# Patient Record
Sex: MALE | Race: WHITE | Employment: OTHER | URBAN - METROPOLITAN AREA
[De-identification: names, ages, dates, MRNs, and addresses within clinical notes are randomized per-mention and may not be internally consistent; named-entity substitution may affect disease eponyms.]

---

## 2017-02-16 ENCOUNTER — OFFICE VISIT (OUTPATIENT)
Dept: FAMILY MEDICINE CLINIC | Age: 72
End: 2017-02-16

## 2017-02-16 ENCOUNTER — HOSPITAL ENCOUNTER (OUTPATIENT)
Dept: OTHER | Age: 72
Discharge: HOME | End: 2017-02-16
Attending: FAMILY MEDICINE | Admitting: FAMILY MEDICINE

## 2017-02-16 VITALS
SYSTOLIC BLOOD PRESSURE: 130 MMHG | WEIGHT: 214.2 LBS | BODY MASS INDEX: 31.73 KG/M2 | DIASTOLIC BLOOD PRESSURE: 70 MMHG | HEIGHT: 69 IN

## 2017-02-16 DIAGNOSIS — I27.20 PULMONARY HYPERTENSION (HCC): ICD-10-CM

## 2017-02-16 DIAGNOSIS — Z23 NEED FOR PNEUMOCOCCAL VACCINATION: ICD-10-CM

## 2017-02-16 DIAGNOSIS — I48.19 PERSISTENT ATRIAL FIBRILLATION (HCC): ICD-10-CM

## 2017-02-16 DIAGNOSIS — I10 ESSENTIAL HYPERTENSION: Primary | ICD-10-CM

## 2017-02-16 DIAGNOSIS — M17.10 KNEE ARTHROPATHY: ICD-10-CM

## 2017-02-16 DIAGNOSIS — E11.9 TYPE 2 DIABETES MELLITUS WITHOUT COMPLICATION, WITHOUT LONG-TERM CURRENT USE OF INSULIN (HCC): ICD-10-CM

## 2017-02-16 DIAGNOSIS — E78.49 OTHER HYPERLIPIDEMIA: ICD-10-CM

## 2017-02-16 LAB
ANION GAP SERPL CALCULATED.3IONS-SCNC: 18 MMOL/L (ref 3–16)
BILIRUBIN, POC: ABNORMAL
BLOOD URINE, POC: ABNORMAL
BUN BLDV-MCNC: 12 MG/DL (ref 7–20)
CALCIUM SERPL-MCNC: 9.5 MG/DL (ref 8.3–10.6)
CHLORIDE BLD-SCNC: 92 MMOL/L (ref 99–110)
CHOLESTEROL, TOTAL: 106 MG/DL (ref 0–199)
CLARITY, POC: ABNORMAL
CO2: 26 MMOL/L (ref 21–32)
COLOR, POC: YELLOW
CREAT SERPL-MCNC: 0.7 MG/DL (ref 0.8–1.3)
CREATININE URINE: 27.4 MG/DL (ref 39–259)
GFR AFRICAN AMERICAN: >60
GFR NON-AFRICAN AMERICAN: >60
GLUCOSE BLD-MCNC: 118 MG/DL (ref 70–99)
GLUCOSE URINE, POC: ABNORMAL
HCT VFR BLD CALC: 42.4 % (ref 40.5–52.5)
HDLC SERPL-MCNC: 49 MG/DL (ref 40–60)
HEMOGLOBIN: 13.9 G/DL (ref 13.5–17.5)
KETONES, POC: ABNORMAL
LDL CHOLESTEROL CALCULATED: 39 MG/DL
LEUKOCYTE EST, POC: ABNORMAL
MCH RBC QN AUTO: 31.6 PG (ref 26–34)
MCHC RBC AUTO-ENTMCNC: 32.8 G/DL (ref 31–36)
MCV RBC AUTO: 96.2 FL (ref 80–100)
MICROALBUMIN UR-MCNC: <1.2 MG/DL
MICROALBUMIN/CREAT UR-RTO: ABNORMAL MG/G (ref 0–30)
NITRITE, POC: ABNORMAL
PDW BLD-RTO: 13.2 % (ref 12.4–15.4)
PH, POC: 6
PLATELET # BLD: 293 K/UL (ref 135–450)
PMV BLD AUTO: 9.1 FL (ref 5–10.5)
POTASSIUM SERPL-SCNC: 4.9 MMOL/L (ref 3.5–5.1)
PRO-BNP: 1162 PG/ML (ref 0–124)
PROTEIN, POC: ABNORMAL
RBC # BLD: 4.41 M/UL (ref 4.2–5.9)
SEDIMENTATION RATE, ERYTHROCYTE: 35 MM/HR (ref 0–20)
SODIUM BLD-SCNC: 136 MMOL/L (ref 136–145)
SPECIFIC GRAVITY, POC: 1
TRIGL SERPL-MCNC: 92 MG/DL (ref 0–150)
URIC ACID, SERUM: 9.1 MG/DL (ref 3.5–7.2)
UROBILINOGEN, POC: 0.2
VLDLC SERPL CALC-MCNC: 18 MG/DL
WBC # BLD: 8.4 K/UL (ref 4–11)

## 2017-02-16 PROCEDURE — 3046F HEMOGLOBIN A1C LEVEL >9.0%: CPT | Performed by: FAMILY MEDICINE

## 2017-02-16 PROCEDURE — 36415 COLL VENOUS BLD VENIPUNCTURE: CPT | Performed by: FAMILY MEDICINE

## 2017-02-16 PROCEDURE — G8417 CALC BMI ABV UP PARAM F/U: HCPCS | Performed by: FAMILY MEDICINE

## 2017-02-16 PROCEDURE — 1123F ACP DISCUSS/DSCN MKR DOCD: CPT | Performed by: FAMILY MEDICINE

## 2017-02-16 PROCEDURE — 1036F TOBACCO NON-USER: CPT | Performed by: FAMILY MEDICINE

## 2017-02-16 PROCEDURE — 81002 URINALYSIS NONAUTO W/O SCOPE: CPT | Performed by: FAMILY MEDICINE

## 2017-02-16 PROCEDURE — 99214 OFFICE O/P EST MOD 30 MIN: CPT | Performed by: FAMILY MEDICINE

## 2017-02-16 PROCEDURE — G8484 FLU IMMUNIZE NO ADMIN: HCPCS | Performed by: FAMILY MEDICINE

## 2017-02-16 PROCEDURE — 3017F COLORECTAL CA SCREEN DOC REV: CPT | Performed by: FAMILY MEDICINE

## 2017-02-16 PROCEDURE — G8427 DOCREV CUR MEDS BY ELIG CLIN: HCPCS | Performed by: FAMILY MEDICINE

## 2017-02-16 PROCEDURE — 4040F PNEUMOC VAC/ADMIN/RCVD: CPT | Performed by: FAMILY MEDICINE

## 2017-02-16 PROCEDURE — 90670 PCV13 VACCINE IM: CPT | Performed by: FAMILY MEDICINE

## 2017-02-16 PROCEDURE — G0009 ADMIN PNEUMOCOCCAL VACCINE: HCPCS | Performed by: FAMILY MEDICINE

## 2017-02-16 RX ORDER — LISINOPRIL 40 MG/1
40 TABLET ORAL DAILY
COMMUNITY
End: 2017-08-14 | Stop reason: ALTCHOICE

## 2017-02-16 RX ORDER — SULFAMETHOXAZOLE AND TRIMETHOPRIM 800; 160 MG/1; MG/1
1 TABLET ORAL 2 TIMES DAILY
Qty: 20 TABLET | Refills: 0 | Status: SHIPPED | OUTPATIENT
Start: 2017-02-16 | End: 2017-02-16 | Stop reason: SDUPTHER

## 2017-02-16 RX ORDER — SULFAMETHOXAZOLE AND TRIMETHOPRIM 800; 160 MG/1; MG/1
1 TABLET ORAL 2 TIMES DAILY
Qty: 20 TABLET | Refills: 0 | Status: SHIPPED | OUTPATIENT
Start: 2017-02-16 | End: 2017-02-26

## 2017-02-16 RX ORDER — AMLODIPINE BESYLATE 10 MG/1
5 TABLET ORAL DAILY
Qty: 30 TABLET | Refills: 3
Start: 2017-02-16 | End: 2017-05-24 | Stop reason: SINTOL

## 2017-02-16 ASSESSMENT — PATIENT HEALTH QUESTIONNAIRE - PHQ9
SUM OF ALL RESPONSES TO PHQ QUESTIONS 1-9: 1
1. LITTLE INTEREST OR PLEASURE IN DOING THINGS: 1
SUM OF ALL RESPONSES TO PHQ9 QUESTIONS 1 & 2: 1
2. FEELING DOWN, DEPRESSED OR HOPELESS: 0

## 2017-02-16 ASSESSMENT — ENCOUNTER SYMPTOMS
TROUBLE SWALLOWING: 0
CONSTIPATION: 0
CHEST TIGHTNESS: 0
SHORTNESS OF BREATH: 0
SORE THROAT: 0
ABDOMINAL PAIN: 0
DIARRHEA: 0
WHEEZING: 0
BLOOD IN STOOL: 0

## 2017-02-17 DIAGNOSIS — M10.9 GOUTY ARTHROPATHY: Primary | ICD-10-CM

## 2017-02-17 LAB
ESTIMATED AVERAGE GLUCOSE: 134.1 MG/DL
HBA1C MFR BLD: 6.3 %

## 2017-02-17 RX ORDER — METHYLPREDNISOLONE 4 MG/1
TABLET ORAL
Qty: 1 KIT | Refills: 0 | Status: SHIPPED | OUTPATIENT
Start: 2017-02-17 | End: 2017-02-23

## 2017-03-10 ENCOUNTER — TELEPHONE (OUTPATIENT)
Dept: FAMILY MEDICINE CLINIC | Age: 72
End: 2017-03-10

## 2017-05-24 ENCOUNTER — OFFICE VISIT (OUTPATIENT)
Dept: FAMILY MEDICINE CLINIC | Age: 72
End: 2017-05-24

## 2017-05-24 VITALS
DIASTOLIC BLOOD PRESSURE: 70 MMHG | SYSTOLIC BLOOD PRESSURE: 134 MMHG | BODY MASS INDEX: 31.76 KG/M2 | HEIGHT: 69 IN | WEIGHT: 214.4 LBS

## 2017-05-24 DIAGNOSIS — M25.473 ANKLE EDEMA: Primary | ICD-10-CM

## 2017-05-24 DIAGNOSIS — I27.20 PULMONARY HYPERTENSION (HCC): ICD-10-CM

## 2017-05-24 DIAGNOSIS — E11.9 TYPE 2 DIABETES MELLITUS WITHOUT COMPLICATION, WITHOUT LONG-TERM CURRENT USE OF INSULIN (HCC): ICD-10-CM

## 2017-05-24 DIAGNOSIS — I10 ESSENTIAL HYPERTENSION: ICD-10-CM

## 2017-05-24 PROCEDURE — 1123F ACP DISCUSS/DSCN MKR DOCD: CPT | Performed by: FAMILY MEDICINE

## 2017-05-24 PROCEDURE — G8417 CALC BMI ABV UP PARAM F/U: HCPCS | Performed by: FAMILY MEDICINE

## 2017-05-24 PROCEDURE — 4040F PNEUMOC VAC/ADMIN/RCVD: CPT | Performed by: FAMILY MEDICINE

## 2017-05-24 PROCEDURE — G8427 DOCREV CUR MEDS BY ELIG CLIN: HCPCS | Performed by: FAMILY MEDICINE

## 2017-05-24 PROCEDURE — 1036F TOBACCO NON-USER: CPT | Performed by: FAMILY MEDICINE

## 2017-05-24 PROCEDURE — 99213 OFFICE O/P EST LOW 20 MIN: CPT | Performed by: FAMILY MEDICINE

## 2017-05-24 PROCEDURE — 3044F HG A1C LEVEL LT 7.0%: CPT | Performed by: FAMILY MEDICINE

## 2017-05-24 PROCEDURE — 3017F COLORECTAL CA SCREEN DOC REV: CPT | Performed by: FAMILY MEDICINE

## 2017-05-24 RX ORDER — FUROSEMIDE 20 MG/1
40 TABLET ORAL 2 TIMES DAILY
Qty: 120 TABLET | Refills: 3 | Status: SHIPPED | OUTPATIENT
Start: 2017-05-24 | End: 2018-07-30 | Stop reason: SDUPTHER

## 2017-05-24 ASSESSMENT — ENCOUNTER SYMPTOMS
CHEST TIGHTNESS: 0
ABDOMINAL PAIN: 0
SHORTNESS OF BREATH: 0

## 2017-06-13 ENCOUNTER — OFFICE VISIT (OUTPATIENT)
Dept: FAMILY MEDICINE CLINIC | Age: 72
End: 2017-06-13

## 2017-06-13 VITALS
HEIGHT: 70 IN | HEART RATE: 101 BPM | DIASTOLIC BLOOD PRESSURE: 80 MMHG | WEIGHT: 210 LBS | BODY MASS INDEX: 30.06 KG/M2 | SYSTOLIC BLOOD PRESSURE: 130 MMHG

## 2017-06-13 DIAGNOSIS — E11.9 TYPE 2 DIABETES MELLITUS WITHOUT COMPLICATION, WITHOUT LONG-TERM CURRENT USE OF INSULIN (HCC): ICD-10-CM

## 2017-06-13 DIAGNOSIS — I48.19 PERSISTENT ATRIAL FIBRILLATION (HCC): ICD-10-CM

## 2017-06-13 DIAGNOSIS — I27.20 PULMONARY HYPERTENSION (HCC): Primary | ICD-10-CM

## 2017-06-13 DIAGNOSIS — I10 ESSENTIAL HYPERTENSION: ICD-10-CM

## 2017-06-13 PROCEDURE — G8417 CALC BMI ABV UP PARAM F/U: HCPCS | Performed by: FAMILY MEDICINE

## 2017-06-13 PROCEDURE — 4040F PNEUMOC VAC/ADMIN/RCVD: CPT | Performed by: FAMILY MEDICINE

## 2017-06-13 PROCEDURE — 99213 OFFICE O/P EST LOW 20 MIN: CPT | Performed by: FAMILY MEDICINE

## 2017-06-13 PROCEDURE — 1123F ACP DISCUSS/DSCN MKR DOCD: CPT | Performed by: FAMILY MEDICINE

## 2017-06-13 PROCEDURE — G8427 DOCREV CUR MEDS BY ELIG CLIN: HCPCS | Performed by: FAMILY MEDICINE

## 2017-06-13 PROCEDURE — 3017F COLORECTAL CA SCREEN DOC REV: CPT | Performed by: FAMILY MEDICINE

## 2017-06-13 PROCEDURE — 1036F TOBACCO NON-USER: CPT | Performed by: FAMILY MEDICINE

## 2017-06-13 PROCEDURE — 3046F HEMOGLOBIN A1C LEVEL >9.0%: CPT | Performed by: FAMILY MEDICINE

## 2017-06-14 ASSESSMENT — ENCOUNTER SYMPTOMS
CHEST TIGHTNESS: 0
ABDOMINAL PAIN: 0
SHORTNESS OF BREATH: 0

## 2017-08-22 ENCOUNTER — TELEPHONE (OUTPATIENT)
Dept: CARDIOLOGY CLINIC | Age: 72
End: 2017-08-22

## 2017-08-30 ENCOUNTER — OFFICE VISIT (OUTPATIENT)
Dept: CARDIOLOGY CLINIC | Age: 72
End: 2017-08-30

## 2017-08-30 VITALS
HEART RATE: 96 BPM | SYSTOLIC BLOOD PRESSURE: 148 MMHG | HEIGHT: 70 IN | BODY MASS INDEX: 28.35 KG/M2 | WEIGHT: 198 LBS | DIASTOLIC BLOOD PRESSURE: 90 MMHG | OXYGEN SATURATION: 96 %

## 2017-08-30 DIAGNOSIS — M25.512 ACUTE PAIN OF LEFT SHOULDER: ICD-10-CM

## 2017-08-30 DIAGNOSIS — I10 ESSENTIAL HYPERTENSION: ICD-10-CM

## 2017-08-30 DIAGNOSIS — I48.19 PERSISTENT ATRIAL FIBRILLATION (HCC): Primary | ICD-10-CM

## 2017-08-30 DIAGNOSIS — J90 PLEURAL EFFUSION: ICD-10-CM

## 2017-08-30 DIAGNOSIS — I20.8 ANGINA AT REST (HCC): ICD-10-CM

## 2017-08-30 PROCEDURE — 4040F PNEUMOC VAC/ADMIN/RCVD: CPT | Performed by: INTERNAL MEDICINE

## 2017-08-30 PROCEDURE — G8598 ASA/ANTIPLAT THER USED: HCPCS | Performed by: INTERNAL MEDICINE

## 2017-08-30 PROCEDURE — 99215 OFFICE O/P EST HI 40 MIN: CPT | Performed by: INTERNAL MEDICINE

## 2017-08-30 PROCEDURE — G8417 CALC BMI ABV UP PARAM F/U: HCPCS | Performed by: INTERNAL MEDICINE

## 2017-08-30 PROCEDURE — 93000 ELECTROCARDIOGRAM COMPLETE: CPT | Performed by: INTERNAL MEDICINE

## 2017-08-30 PROCEDURE — G8427 DOCREV CUR MEDS BY ELIG CLIN: HCPCS | Performed by: INTERNAL MEDICINE

## 2017-08-30 PROCEDURE — 1123F ACP DISCUSS/DSCN MKR DOCD: CPT | Performed by: INTERNAL MEDICINE

## 2017-08-30 PROCEDURE — 1036F TOBACCO NON-USER: CPT | Performed by: INTERNAL MEDICINE

## 2017-08-30 PROCEDURE — 3017F COLORECTAL CA SCREEN DOC REV: CPT | Performed by: INTERNAL MEDICINE

## 2017-08-30 RX ORDER — LISINOPRIL 5 MG/1
5 TABLET ORAL DAILY
Qty: 30 TABLET | Refills: 6 | Status: ON HOLD | OUTPATIENT
Start: 2017-08-30 | End: 2019-01-01 | Stop reason: HOSPADM

## 2017-08-30 RX ORDER — LISINOPRIL 5 MG/1
5 TABLET ORAL DAILY
Qty: 30 TABLET | Refills: 6 | Status: SHIPPED | OUTPATIENT
Start: 2017-08-30 | End: 2017-08-30 | Stop reason: SDUPTHER

## 2017-09-01 ENCOUNTER — TELEPHONE (OUTPATIENT)
Dept: CARDIOLOGY CLINIC | Age: 72
End: 2017-09-01

## 2017-09-01 RX ORDER — CLONIDINE HYDROCHLORIDE 0.1 MG/1
0.1 TABLET ORAL 2 TIMES DAILY
Qty: 60 TABLET | Refills: 3 | Status: SHIPPED | OUTPATIENT
Start: 2017-09-01 | End: 2019-01-01 | Stop reason: DRUGHIGH

## 2017-09-18 ENCOUNTER — HOSPITAL ENCOUNTER (OUTPATIENT)
Dept: NON INVASIVE DIAGNOSTICS | Age: 72
Discharge: HOME | End: 2017-09-18
Admitting: INTERNAL MEDICINE

## 2017-09-18 DIAGNOSIS — I20.8 OTHER FORMS OF ANGINA PECTORIS (HCC): ICD-10-CM

## 2017-09-18 DIAGNOSIS — J90 PLEURAL EFFUSION: ICD-10-CM

## 2017-09-21 ENCOUNTER — TELEPHONE (OUTPATIENT)
Dept: CARDIOLOGY CLINIC | Age: 72
End: 2017-09-21

## 2018-01-01 ENCOUNTER — HOSPITAL ENCOUNTER (OUTPATIENT)
Dept: GENERAL RADIOLOGY | Age: 73
Discharge: HOME | End: 2018-12-10
Payer: MEDICARE

## 2018-01-01 ENCOUNTER — TELEPHONE (OUTPATIENT)
Dept: FAMILY MEDICINE CLINIC | Age: 73
End: 2018-01-01

## 2018-01-01 ENCOUNTER — HOSPITAL ENCOUNTER (OUTPATIENT)
Age: 73
Discharge: HOME | End: 2018-12-10
Payer: MEDICARE

## 2018-01-01 ENCOUNTER — OFFICE VISIT (OUTPATIENT)
Dept: CARDIOLOGY CLINIC | Age: 73
End: 2018-01-01
Payer: MEDICARE

## 2018-01-01 VITALS
HEIGHT: 70 IN | DIASTOLIC BLOOD PRESSURE: 74 MMHG | WEIGHT: 194 LBS | HEART RATE: 62 BPM | OXYGEN SATURATION: 95 % | SYSTOLIC BLOOD PRESSURE: 116 MMHG | BODY MASS INDEX: 27.77 KG/M2

## 2018-01-01 DIAGNOSIS — I73.9 INTERMITTENT CLAUDICATION (HCC): ICD-10-CM

## 2018-01-01 DIAGNOSIS — I10 ESSENTIAL HYPERTENSION: ICD-10-CM

## 2018-01-01 DIAGNOSIS — R52 PAIN: ICD-10-CM

## 2018-01-01 DIAGNOSIS — I25.10 CORONARY ARTERY CALCIFICATION SEEN ON CT SCAN: ICD-10-CM

## 2018-01-01 DIAGNOSIS — I48.20 CHRONIC ATRIAL FIBRILLATION (HCC): Primary | ICD-10-CM

## 2018-01-01 DIAGNOSIS — E78.5 HYPERLIPIDEMIA LDL GOAL <70: ICD-10-CM

## 2018-01-01 DIAGNOSIS — I73.9 PAD (PERIPHERAL ARTERY DISEASE) (HCC): ICD-10-CM

## 2018-01-01 DIAGNOSIS — R13.10 DYSPHAGIA, UNSPECIFIED TYPE: ICD-10-CM

## 2018-01-01 PROCEDURE — 3017F COLORECTAL CA SCREEN DOC REV: CPT | Performed by: INTERNAL MEDICINE

## 2018-01-01 PROCEDURE — 93000 ELECTROCARDIOGRAM COMPLETE: CPT | Performed by: INTERNAL MEDICINE

## 2018-01-01 PROCEDURE — 99214 OFFICE O/P EST MOD 30 MIN: CPT | Performed by: INTERNAL MEDICINE

## 2018-01-01 PROCEDURE — 1101F PT FALLS ASSESS-DOCD LE1/YR: CPT | Performed by: INTERNAL MEDICINE

## 2018-01-01 PROCEDURE — 1123F ACP DISCUSS/DSCN MKR DOCD: CPT | Performed by: INTERNAL MEDICINE

## 2018-01-01 PROCEDURE — G8417 CALC BMI ABV UP PARAM F/U: HCPCS | Performed by: INTERNAL MEDICINE

## 2018-01-01 PROCEDURE — G8482 FLU IMMUNIZE ORDER/ADMIN: HCPCS | Performed by: INTERNAL MEDICINE

## 2018-01-01 PROCEDURE — 1036F TOBACCO NON-USER: CPT | Performed by: INTERNAL MEDICINE

## 2018-01-01 PROCEDURE — 71046 X-RAY EXAM CHEST 2 VIEWS: CPT

## 2018-01-01 PROCEDURE — G8599 NO ASA/ANTIPLAT THER USE RNG: HCPCS | Performed by: INTERNAL MEDICINE

## 2018-01-01 PROCEDURE — G8427 DOCREV CUR MEDS BY ELIG CLIN: HCPCS | Performed by: INTERNAL MEDICINE

## 2018-01-01 PROCEDURE — 4040F PNEUMOC VAC/ADMIN/RCVD: CPT | Performed by: INTERNAL MEDICINE

## 2018-02-15 ENCOUNTER — OFFICE VISIT (OUTPATIENT)
Dept: FAMILY MEDICINE CLINIC | Age: 73
End: 2018-02-15

## 2018-02-15 ENCOUNTER — TELEPHONE (OUTPATIENT)
Dept: CARDIOLOGY CLINIC | Age: 73
End: 2018-02-15

## 2018-02-15 VITALS
HEIGHT: 70 IN | WEIGHT: 205 LBS | BODY MASS INDEX: 29.35 KG/M2 | DIASTOLIC BLOOD PRESSURE: 80 MMHG | SYSTOLIC BLOOD PRESSURE: 140 MMHG

## 2018-02-15 DIAGNOSIS — I10 ESSENTIAL HYPERTENSION: Primary | ICD-10-CM

## 2018-02-15 DIAGNOSIS — E11.42 TYPE 2 DIABETES MELLITUS WITH DIABETIC POLYNEUROPATHY, WITHOUT LONG-TERM CURRENT USE OF INSULIN (HCC): ICD-10-CM

## 2018-02-15 DIAGNOSIS — G25.2 RESTING TREMOR: ICD-10-CM

## 2018-02-15 DIAGNOSIS — I48.20 CHRONIC ATRIAL FIBRILLATION (HCC): ICD-10-CM

## 2018-02-15 DIAGNOSIS — E78.2 MIXED HYPERLIPIDEMIA: ICD-10-CM

## 2018-02-15 DIAGNOSIS — I27.20 PULMONARY HYPERTENSION (HCC): ICD-10-CM

## 2018-02-15 PROCEDURE — G8417 CALC BMI ABV UP PARAM F/U: HCPCS | Performed by: FAMILY MEDICINE

## 2018-02-15 PROCEDURE — 99214 OFFICE O/P EST MOD 30 MIN: CPT | Performed by: FAMILY MEDICINE

## 2018-02-15 PROCEDURE — 3046F HEMOGLOBIN A1C LEVEL >9.0%: CPT | Performed by: FAMILY MEDICINE

## 2018-02-15 PROCEDURE — 1123F ACP DISCUSS/DSCN MKR DOCD: CPT | Performed by: FAMILY MEDICINE

## 2018-02-15 PROCEDURE — G8484 FLU IMMUNIZE NO ADMIN: HCPCS | Performed by: FAMILY MEDICINE

## 2018-02-15 PROCEDURE — G8427 DOCREV CUR MEDS BY ELIG CLIN: HCPCS | Performed by: FAMILY MEDICINE

## 2018-02-15 PROCEDURE — 3017F COLORECTAL CA SCREEN DOC REV: CPT | Performed by: FAMILY MEDICINE

## 2018-02-15 PROCEDURE — 4040F PNEUMOC VAC/ADMIN/RCVD: CPT | Performed by: FAMILY MEDICINE

## 2018-02-15 PROCEDURE — 1036F TOBACCO NON-USER: CPT | Performed by: FAMILY MEDICINE

## 2018-02-15 ASSESSMENT — ENCOUNTER SYMPTOMS
TROUBLE SWALLOWING: 0
ABDOMINAL PAIN: 0
SHORTNESS OF BREATH: 0
DIARRHEA: 0
SORE THROAT: 0
BLOOD IN STOOL: 0
CHEST TIGHTNESS: 0
CONSTIPATION: 0
WHEEZING: 0

## 2018-02-16 NOTE — TELEPHONE ENCOUNTER
Spoke with Traci LUNA and paperwork will be completed on Monday AM.  I called pt made him aware he can  Monday. Pt voiced understanding.

## 2018-02-19 ENCOUNTER — TELEPHONE (OUTPATIENT)
Dept: FAMILY MEDICINE CLINIC | Age: 73
End: 2018-02-19

## 2018-03-20 ENCOUNTER — OFFICE VISIT (OUTPATIENT)
Dept: CARDIOLOGY CLINIC | Age: 73
End: 2018-03-20

## 2018-03-20 VITALS
BODY MASS INDEX: 28.63 KG/M2 | HEART RATE: 58 BPM | DIASTOLIC BLOOD PRESSURE: 72 MMHG | HEIGHT: 70 IN | OXYGEN SATURATION: 96 % | SYSTOLIC BLOOD PRESSURE: 130 MMHG | WEIGHT: 200 LBS

## 2018-03-20 DIAGNOSIS — I25.10 CORONARY ARTERY CALCIFICATION SEEN ON CT SCAN: ICD-10-CM

## 2018-03-20 DIAGNOSIS — I10 ESSENTIAL HYPERTENSION: ICD-10-CM

## 2018-03-20 DIAGNOSIS — I48.20 CHRONIC ATRIAL FIBRILLATION (HCC): Primary | ICD-10-CM

## 2018-03-20 DIAGNOSIS — E78.5 HYPERLIPIDEMIA LDL GOAL <70: ICD-10-CM

## 2018-03-20 PROCEDURE — 1123F ACP DISCUSS/DSCN MKR DOCD: CPT | Performed by: INTERNAL MEDICINE

## 2018-03-20 PROCEDURE — 93000 ELECTROCARDIOGRAM COMPLETE: CPT | Performed by: INTERNAL MEDICINE

## 2018-03-20 PROCEDURE — G8427 DOCREV CUR MEDS BY ELIG CLIN: HCPCS | Performed by: INTERNAL MEDICINE

## 2018-03-20 PROCEDURE — 99214 OFFICE O/P EST MOD 30 MIN: CPT | Performed by: INTERNAL MEDICINE

## 2018-03-20 PROCEDURE — 1036F TOBACCO NON-USER: CPT | Performed by: INTERNAL MEDICINE

## 2018-03-20 PROCEDURE — G8484 FLU IMMUNIZE NO ADMIN: HCPCS | Performed by: INTERNAL MEDICINE

## 2018-03-20 PROCEDURE — G8599 NO ASA/ANTIPLAT THER USE RNG: HCPCS | Performed by: INTERNAL MEDICINE

## 2018-03-20 PROCEDURE — 4040F PNEUMOC VAC/ADMIN/RCVD: CPT | Performed by: INTERNAL MEDICINE

## 2018-03-20 PROCEDURE — 3017F COLORECTAL CA SCREEN DOC REV: CPT | Performed by: INTERNAL MEDICINE

## 2018-03-20 PROCEDURE — G8417 CALC BMI ABV UP PARAM F/U: HCPCS | Performed by: INTERNAL MEDICINE

## 2018-05-21 ENCOUNTER — TELEPHONE (OUTPATIENT)
Dept: FAMILY MEDICINE CLINIC | Age: 73
End: 2018-05-21

## 2018-05-21 RX ORDER — METHYLPREDNISOLONE 4 MG/1
TABLET ORAL
Qty: 1 KIT | Refills: 0 | Status: SHIPPED | OUTPATIENT
Start: 2018-05-21 | End: 2018-05-27

## 2018-05-21 RX ORDER — METHYLPREDNISOLONE 4 MG/1
TABLET ORAL
Qty: 1 KIT | Refills: 0 | Status: SHIPPED | OUTPATIENT
Start: 2018-05-21 | End: 2018-05-21 | Stop reason: SDUPTHER

## 2018-06-14 ENCOUNTER — OFFICE VISIT (OUTPATIENT)
Dept: FAMILY MEDICINE CLINIC | Age: 73
End: 2018-06-14

## 2018-06-14 ENCOUNTER — HOSPITAL ENCOUNTER (OUTPATIENT)
Dept: OTHER | Age: 73
Discharge: HOME | End: 2018-06-14
Attending: FAMILY MEDICINE | Admitting: FAMILY MEDICINE

## 2018-06-14 VITALS
DIASTOLIC BLOOD PRESSURE: 70 MMHG | HEIGHT: 70 IN | BODY MASS INDEX: 28.83 KG/M2 | WEIGHT: 201.4 LBS | SYSTOLIC BLOOD PRESSURE: 120 MMHG

## 2018-06-14 DIAGNOSIS — R60.9 DEPENDENT EDEMA: Primary | ICD-10-CM

## 2018-06-14 DIAGNOSIS — I27.20 PULMONARY HYPERTENSION (HCC): ICD-10-CM

## 2018-06-14 DIAGNOSIS — E11.42 TYPE 2 DIABETES MELLITUS WITH DIABETIC POLYNEUROPATHY, WITHOUT LONG-TERM CURRENT USE OF INSULIN (HCC): ICD-10-CM

## 2018-06-14 DIAGNOSIS — I10 ESSENTIAL HYPERTENSION: ICD-10-CM

## 2018-06-14 DIAGNOSIS — R60.9 DEPENDENT EDEMA: ICD-10-CM

## 2018-06-14 LAB
A/G RATIO: 1.9 (ref 1.1–2.2)
ALBUMIN SERPL-MCNC: 4.1 G/DL (ref 3.4–5)
ALP BLD-CCNC: 239 U/L (ref 40–129)
ALT SERPL-CCNC: 8 U/L (ref 10–40)
ANION GAP SERPL CALCULATED.3IONS-SCNC: 18 MMOL/L (ref 3–16)
AST SERPL-CCNC: 11 U/L (ref 15–37)
BILIRUB SERPL-MCNC: 0.9 MG/DL (ref 0–1)
BUN BLDV-MCNC: 14 MG/DL (ref 7–20)
CALCIUM SERPL-MCNC: 9 MG/DL (ref 8.3–10.6)
CHLORIDE BLD-SCNC: 97 MMOL/L (ref 99–110)
CO2: 26 MMOL/L (ref 21–32)
CREAT SERPL-MCNC: 1.4 MG/DL (ref 0.8–1.3)
GFR AFRICAN AMERICAN: >60
GFR NON-AFRICAN AMERICAN: 50
GLOBULIN: 2.2 G/DL
GLUCOSE BLD-MCNC: 233 MG/DL (ref 70–99)
HCT VFR BLD CALC: 36.1 % (ref 40.5–52.5)
HEMOGLOBIN: 12.5 G/DL (ref 13.5–17.5)
MCH RBC QN AUTO: 32.1 PG (ref 26–34)
MCHC RBC AUTO-ENTMCNC: 34.5 G/DL (ref 31–36)
MCV RBC AUTO: 92.9 FL (ref 80–100)
PDW BLD-RTO: 14 % (ref 12.4–15.4)
PLATELET # BLD: 193 K/UL (ref 135–450)
PMV BLD AUTO: 8.9 FL (ref 5–10.5)
POTASSIUM SERPL-SCNC: 4.6 MMOL/L (ref 3.5–5.1)
PRO-BNP: 2812 PG/ML (ref 0–124)
RBC # BLD: 3.89 M/UL (ref 4.2–5.9)
SODIUM BLD-SCNC: 141 MMOL/L (ref 136–145)
TOTAL PROTEIN: 6.3 G/DL (ref 6.4–8.2)
TSH REFLEX: 2.29 UIU/ML (ref 0.27–4.2)
WBC # BLD: 7.2 K/UL (ref 4–11)

## 2018-06-14 PROCEDURE — 2022F DILAT RTA XM EVC RTNOPTHY: CPT | Performed by: FAMILY MEDICINE

## 2018-06-14 PROCEDURE — 4040F PNEUMOC VAC/ADMIN/RCVD: CPT | Performed by: FAMILY MEDICINE

## 2018-06-14 PROCEDURE — 1036F TOBACCO NON-USER: CPT | Performed by: FAMILY MEDICINE

## 2018-06-14 PROCEDURE — 3046F HEMOGLOBIN A1C LEVEL >9.0%: CPT | Performed by: FAMILY MEDICINE

## 2018-06-14 PROCEDURE — 36415 COLL VENOUS BLD VENIPUNCTURE: CPT | Performed by: FAMILY MEDICINE

## 2018-06-14 PROCEDURE — G8427 DOCREV CUR MEDS BY ELIG CLIN: HCPCS | Performed by: FAMILY MEDICINE

## 2018-06-14 PROCEDURE — 1123F ACP DISCUSS/DSCN MKR DOCD: CPT | Performed by: FAMILY MEDICINE

## 2018-06-14 PROCEDURE — G8599 NO ASA/ANTIPLAT THER USE RNG: HCPCS | Performed by: FAMILY MEDICINE

## 2018-06-14 PROCEDURE — 3017F COLORECTAL CA SCREEN DOC REV: CPT | Performed by: FAMILY MEDICINE

## 2018-06-14 PROCEDURE — G8417 CALC BMI ABV UP PARAM F/U: HCPCS | Performed by: FAMILY MEDICINE

## 2018-06-14 PROCEDURE — 99214 OFFICE O/P EST MOD 30 MIN: CPT | Performed by: FAMILY MEDICINE

## 2018-06-14 ASSESSMENT — ENCOUNTER SYMPTOMS
COUGH: 0
EYE PAIN: 0
SHORTNESS OF BREATH: 0
WHEEZING: 0
ABDOMINAL PAIN: 0

## 2018-06-14 ASSESSMENT — PATIENT HEALTH QUESTIONNAIRE - PHQ9
SUM OF ALL RESPONSES TO PHQ9 QUESTIONS 1 & 2: 0
1. LITTLE INTEREST OR PLEASURE IN DOING THINGS: 0
SUM OF ALL RESPONSES TO PHQ QUESTIONS 1-9: 0
2. FEELING DOWN, DEPRESSED OR HOPELESS: 0

## 2018-06-15 DIAGNOSIS — R74.8 ALKALINE PHOSPHATASE ELEVATION: ICD-10-CM

## 2018-06-15 DIAGNOSIS — N18.2 CRF (CHRONIC RENAL FAILURE), STAGE 2 (MILD): Primary | ICD-10-CM

## 2018-06-15 LAB
ESTIMATED AVERAGE GLUCOSE: 168.6 MG/DL
HBA1C MFR BLD: 7.5 %

## 2018-06-16 ENCOUNTER — TELEPHONE (OUTPATIENT)
Dept: FAMILY MEDICINE CLINIC | Age: 73
End: 2018-06-16

## 2018-06-16 NOTE — TELEPHONE ENCOUNTER
Patient got a call from [facility] to schedule for an ultra sound for his liver and kidney. They schedule it for Monday. He is anxious about this and would like Dr Mario Hyatt or Amarilys Blackmon to call back. Please call back.

## 2018-06-18 ENCOUNTER — HOSPITAL ENCOUNTER (OUTPATIENT)
Dept: ULTRASOUND IMAGING | Age: 73
Discharge: HOME | End: 2018-06-18
Admitting: FAMILY MEDICINE

## 2018-06-18 DIAGNOSIS — N18.2 CRF (CHRONIC RENAL FAILURE), STAGE 2 (MILD): ICD-10-CM

## 2018-06-18 DIAGNOSIS — R74.8 ALKALINE PHOSPHATASE ELEVATION: ICD-10-CM

## 2018-06-18 DIAGNOSIS — N18.2 CHRONIC KIDNEY DISEASE, STAGE II (MILD): ICD-10-CM

## 2018-06-21 ENCOUNTER — OFFICE VISIT (OUTPATIENT)
Dept: FAMILY MEDICINE CLINIC | Age: 73
End: 2018-06-21

## 2018-06-21 VITALS
HEIGHT: 70 IN | BODY MASS INDEX: 28.77 KG/M2 | WEIGHT: 201 LBS | DIASTOLIC BLOOD PRESSURE: 80 MMHG | SYSTOLIC BLOOD PRESSURE: 130 MMHG

## 2018-06-21 DIAGNOSIS — I50.22 CHRONIC SYSTOLIC CONGESTIVE HEART FAILURE (HCC): ICD-10-CM

## 2018-06-21 DIAGNOSIS — E11.42 TYPE 2 DIABETES MELLITUS WITH DIABETIC POLYNEUROPATHY, WITHOUT LONG-TERM CURRENT USE OF INSULIN (HCC): Primary | ICD-10-CM

## 2018-06-21 DIAGNOSIS — I10 ESSENTIAL HYPERTENSION: ICD-10-CM

## 2018-06-21 DIAGNOSIS — I51.7 LVH (LEFT VENTRICULAR HYPERTROPHY): ICD-10-CM

## 2018-06-21 DIAGNOSIS — I48.20 CHRONIC ATRIAL FIBRILLATION (HCC): ICD-10-CM

## 2018-06-21 LAB
A/G RATIO: 1.6 (ref 1.1–2.2)
ALBUMIN SERPL-MCNC: 4.2 G/DL (ref 3.4–5)
ALP BLD-CCNC: 236 U/L (ref 40–129)
ALT SERPL-CCNC: 9 U/L (ref 10–40)
ANION GAP SERPL CALCULATED.3IONS-SCNC: 16 MMOL/L (ref 3–16)
AST SERPL-CCNC: 15 U/L (ref 15–37)
BILIRUB SERPL-MCNC: 0.8 MG/DL (ref 0–1)
BUN BLDV-MCNC: 13 MG/DL (ref 7–20)
CALCIUM SERPL-MCNC: 9.3 MG/DL (ref 8.3–10.6)
CHLORIDE BLD-SCNC: 97 MMOL/L (ref 99–110)
CO2: 28 MMOL/L (ref 21–32)
CREAT SERPL-MCNC: 1.1 MG/DL (ref 0.8–1.3)
GFR AFRICAN AMERICAN: >60
GFR NON-AFRICAN AMERICAN: >60
GLOBULIN: 2.6 G/DL
GLUCOSE BLD-MCNC: 135 MG/DL (ref 70–99)
POTASSIUM SERPL-SCNC: 5.2 MMOL/L (ref 3.5–5.1)
PRO-BNP: 3863 PG/ML (ref 0–124)
SODIUM BLD-SCNC: 141 MMOL/L (ref 136–145)
TOTAL PROTEIN: 6.8 G/DL (ref 6.4–8.2)

## 2018-06-21 PROCEDURE — 36415 COLL VENOUS BLD VENIPUNCTURE: CPT | Performed by: FAMILY MEDICINE

## 2018-06-21 PROCEDURE — G8427 DOCREV CUR MEDS BY ELIG CLIN: HCPCS | Performed by: FAMILY MEDICINE

## 2018-06-21 PROCEDURE — 2022F DILAT RTA XM EVC RTNOPTHY: CPT | Performed by: FAMILY MEDICINE

## 2018-06-21 PROCEDURE — G8417 CALC BMI ABV UP PARAM F/U: HCPCS | Performed by: FAMILY MEDICINE

## 2018-06-21 PROCEDURE — G8599 NO ASA/ANTIPLAT THER USE RNG: HCPCS | Performed by: FAMILY MEDICINE

## 2018-06-21 PROCEDURE — 3017F COLORECTAL CA SCREEN DOC REV: CPT | Performed by: FAMILY MEDICINE

## 2018-06-21 PROCEDURE — 1123F ACP DISCUSS/DSCN MKR DOCD: CPT | Performed by: FAMILY MEDICINE

## 2018-06-21 PROCEDURE — 1036F TOBACCO NON-USER: CPT | Performed by: FAMILY MEDICINE

## 2018-06-21 PROCEDURE — 3045F PR MOST RECENT HEMOGLOBIN A1C LEVEL 7.0-9.0%: CPT | Performed by: FAMILY MEDICINE

## 2018-06-21 PROCEDURE — 4040F PNEUMOC VAC/ADMIN/RCVD: CPT | Performed by: FAMILY MEDICINE

## 2018-06-21 PROCEDURE — 99213 OFFICE O/P EST LOW 20 MIN: CPT | Performed by: FAMILY MEDICINE

## 2018-06-22 ENCOUNTER — TELEPHONE (OUTPATIENT)
Dept: CARDIOLOGY CLINIC | Age: 73
End: 2018-06-22

## 2018-06-22 DIAGNOSIS — I27.20 PULMONARY HYPERTENSION (HCC): Primary | ICD-10-CM

## 2018-06-25 NOTE — TELEPHONE ENCOUNTER
We would need to know a lot more. Weight? Decrease? Increase? Shortness of breath, etc? Also needs a repeat BMP at least for the elevated potassium, moy in light of lisinopril increase.

## 2018-06-25 NOTE — TELEPHONE ENCOUNTER
Dr. Dariel Long     Patient does not complete daily weights but states his weight remains stable and fluctuates between 200-201. He denies any shortness of breath. He has been taking a potassium supplement that he was told he could take by Dr. Halie Dominguez. Instructed him to discontinue taking this given the increase in lisinopril and elevated potassium reading. He will complete repeat labs on Thursday or Friday once stopping the potassium supplement. Encouraged to complete echo as ordered by Dr. Halie Dominguez     Will follow up on patient's status toward the end of the week.

## 2018-06-26 DIAGNOSIS — I51.7 LVH (LEFT VENTRICULAR HYPERTROPHY): Primary | ICD-10-CM

## 2018-06-28 ENCOUNTER — TELEPHONE (OUTPATIENT)
Dept: FAMILY MEDICINE CLINIC | Age: 73
End: 2018-06-28

## 2018-06-29 ENCOUNTER — NURSE ONLY (OUTPATIENT)
Dept: FAMILY MEDICINE CLINIC | Age: 73
End: 2018-06-29

## 2018-06-29 DIAGNOSIS — I50.22 CHRONIC SYSTOLIC HEART FAILURE (HCC): ICD-10-CM

## 2018-06-29 DIAGNOSIS — E87.5 SERUM POTASSIUM ELEVATED: Primary | ICD-10-CM

## 2018-06-29 LAB
ANION GAP SERPL CALCULATED.3IONS-SCNC: 17 MMOL/L (ref 3–16)
BUN BLDV-MCNC: 14 MG/DL (ref 7–20)
CALCIUM SERPL-MCNC: 9.1 MG/DL (ref 8.3–10.6)
CHLORIDE BLD-SCNC: 98 MMOL/L (ref 99–110)
CO2: 28 MMOL/L (ref 21–32)
CREAT SERPL-MCNC: 1.2 MG/DL (ref 0.8–1.3)
GFR AFRICAN AMERICAN: >60
GFR NON-AFRICAN AMERICAN: 59
GLUCOSE BLD-MCNC: 178 MG/DL (ref 70–99)
POTASSIUM SERPL-SCNC: 4.7 MMOL/L (ref 3.5–5.1)
PRO-BNP: 2880 PG/ML (ref 0–124)
SODIUM BLD-SCNC: 143 MMOL/L (ref 136–145)

## 2018-06-29 PROCEDURE — 36415 COLL VENOUS BLD VENIPUNCTURE: CPT | Performed by: FAMILY MEDICINE

## 2018-07-24 ENCOUNTER — HOSPITAL ENCOUNTER (OUTPATIENT)
Dept: NON INVASIVE DIAGNOSTICS | Age: 73
Discharge: HOME | End: 2018-07-24
Attending: FAMILY MEDICINE | Admitting: FAMILY MEDICINE

## 2018-07-24 DIAGNOSIS — I27.20 PULMONARY HYPERTENSION (HCC): ICD-10-CM

## 2018-07-24 LAB
LV EF: 53 %
LVEF MODALITY: NORMAL

## 2018-07-30 ENCOUNTER — TELEPHONE (OUTPATIENT)
Dept: FAMILY MEDICINE CLINIC | Age: 73
End: 2018-07-30

## 2018-07-30 RX ORDER — FUROSEMIDE 20 MG/1
TABLET ORAL
Qty: 120 TABLET | Refills: 3
Start: 2018-07-30 | End: 2019-01-01 | Stop reason: SDUPTHER

## 2018-08-03 ENCOUNTER — TELEPHONE (OUTPATIENT)
Dept: FAMILY MEDICINE CLINIC | Age: 73
End: 2018-08-03

## 2018-08-03 RX ORDER — METHYLPREDNISOLONE 4 MG/1
TABLET ORAL
Qty: 21 TABLET | Refills: 0 | Status: SHIPPED | OUTPATIENT
Start: 2018-08-03 | End: 2018-08-03 | Stop reason: SDUPTHER

## 2018-08-03 RX ORDER — METHYLPREDNISOLONE 4 MG/1
TABLET ORAL
Qty: 21 TABLET | Refills: 0 | Status: SHIPPED | OUTPATIENT
Start: 2018-08-03 | End: 2018-11-06 | Stop reason: ALTCHOICE

## 2018-09-26 ENCOUNTER — OFFICE VISIT (OUTPATIENT)
Dept: CARDIOLOGY CLINIC | Age: 73
End: 2018-09-26
Payer: MEDICARE

## 2018-09-26 VITALS
WEIGHT: 197 LBS | OXYGEN SATURATION: 98 % | HEART RATE: 69 BPM | HEIGHT: 70 IN | BODY MASS INDEX: 28.2 KG/M2 | DIASTOLIC BLOOD PRESSURE: 72 MMHG | SYSTOLIC BLOOD PRESSURE: 120 MMHG

## 2018-09-26 DIAGNOSIS — E78.5 HYPERLIPIDEMIA LDL GOAL <70: ICD-10-CM

## 2018-09-26 DIAGNOSIS — I73.9 CLAUDICATION (HCC): ICD-10-CM

## 2018-09-26 DIAGNOSIS — I48.20 CHRONIC ATRIAL FIBRILLATION (HCC): Primary | ICD-10-CM

## 2018-09-26 DIAGNOSIS — I25.10 CORONARY ARTERY CALCIFICATION SEEN ON CT SCAN: ICD-10-CM

## 2018-09-26 DIAGNOSIS — I10 ESSENTIAL HYPERTENSION: ICD-10-CM

## 2018-09-26 PROCEDURE — 1123F ACP DISCUSS/DSCN MKR DOCD: CPT | Performed by: INTERNAL MEDICINE

## 2018-09-26 PROCEDURE — G8417 CALC BMI ABV UP PARAM F/U: HCPCS | Performed by: INTERNAL MEDICINE

## 2018-09-26 PROCEDURE — G8599 NO ASA/ANTIPLAT THER USE RNG: HCPCS | Performed by: INTERNAL MEDICINE

## 2018-09-26 PROCEDURE — 1101F PT FALLS ASSESS-DOCD LE1/YR: CPT | Performed by: INTERNAL MEDICINE

## 2018-09-26 PROCEDURE — G8427 DOCREV CUR MEDS BY ELIG CLIN: HCPCS | Performed by: INTERNAL MEDICINE

## 2018-09-26 PROCEDURE — 4040F PNEUMOC VAC/ADMIN/RCVD: CPT | Performed by: INTERNAL MEDICINE

## 2018-09-26 PROCEDURE — 3017F COLORECTAL CA SCREEN DOC REV: CPT | Performed by: INTERNAL MEDICINE

## 2018-09-26 PROCEDURE — 1036F TOBACCO NON-USER: CPT | Performed by: INTERNAL MEDICINE

## 2018-09-26 PROCEDURE — 99214 OFFICE O/P EST MOD 30 MIN: CPT | Performed by: INTERNAL MEDICINE

## 2018-09-26 PROCEDURE — 93000 ELECTROCARDIOGRAM COMPLETE: CPT | Performed by: INTERNAL MEDICINE

## 2018-09-26 NOTE — PROGRESS NOTES
DAY WITH MEALS 180 tablet 0    simvastatin (ZOCOR) 20 MG tablet Take 1 tablet by mouth nightly 90 tablet 1    loperamide (IMODIUM) 2 MG capsule Take 1 capsule by mouth as needed. 180 capsule 1     No current facility-administered medications for this visit. Physical Exam:   /72 (Site: Right Upper Arm, Position: Sitting)   Pulse 69   Ht 5' 10\" (1.778 m)   Wt 197 lb (89.4 kg)   SpO2 98%   BMI 28.27 kg/m²   Wt Readings from Last 2 Encounters:   09/26/18 197 lb (89.4 kg)   06/21/18 201 lb (91.2 kg)     Constitutional: He is oriented to person, place, and time. He appears well-developed and well-nourished. In no acute distress. Head: Normocephalic and atraumatic. Neck: Neck supple. No JVD present. Carotid bruit is not present. No mass and no thyromegaly present. No lymphadenopathy present. Cardiovascular: Irreg irreg, normal heart souinds and intact distal pulses. Exam reveals no gallop and no friction rub. No murmur heard. Pulmonary/Chest: Effort normal and breath sounds normal. No respiratory distress. He has no wheezes, rhonchi or rales. Abdominal: Soft, non-tender. Bowel sounds and aorta are normal. He exhibits no organomegaly, mass or bruit. Extremities: No edema. No cyanosis, or clubbing. Pulses are 2+ radial/carotid/dorsalis pedis and posterior tibial bilaterally. Neurological: He is alert and oriented to person, place, and time. He has normal reflexes. No cranial nerve deficit. Coordination normal.   Skin: Skin is warm and dry. There is no rash or diaphoresis. Psychiatric: He has a normal mood and affect.  His speech is normal and behavior is normal.     Pulses:   Femoral    L    1     R1  Popliteal    L    1     R1  DP            L     0 +doppler   R0 +doppler  PT             L    0 +doppler R0 +doppler         EKG Interpretation 1/26/16: Sinus rhythm with first degree AV Block (63bpm)                                     7/22/16: Atrial Fibrillation (81bpm) 3/20/18: Atrial Fibrillation    Lab Review:   Lab Results   Component Value Date    TRIG 92 02/16/2017    HDL 49 02/16/2017    HDL 44 12/22/2011    LDLCALC 39 02/16/2017    LDLDIRECT 99 12/22/2011    LABVLDL 18 02/16/2017     Lab Results   Component Value Date     06/29/2018    K 4.7 06/29/2018    BUN 14 06/29/2018    CREATININE 1.2 06/29/2018     Echo 5/6/15:  LVH with normal systolic function. EF  60%. The left atrium is dilated. Mild mitral regurgitation is present. There is moderate tricuspid regurgitation with RVSP estimated at 63 mmHg. Right atrial size is dilated . Severe Pulmonary hypertension with PASP of 63 mmHg  There is a small circumferential pericardial effusion noted. Cardioversion 6/3/15:  Successful cardioversion from atrial fibrillation with rapid  ventricular response to normal sinus rhythm. The patient was placed on  antiarrhythmic therapy with amiodarone. Echo 7/11/16:  Normal left ventricle size, wall thickness, and systolic function with an estimated ejection fraction of 55-60%. No regional wall motion abnormalities are seen. Normal right ventricular size and function. Mild mitral regurgitation. There is mild-moderate tricuspid regurgitation. Estimated pulmonary artery systolic pressure is 40 mmHg assuming a right atrial pressure of 3 mmHg.     Stress test 9/18/17  Normal myocardial perfusion study.    Study not gated secondary to atrial fibrillation but normal LV size.    Uncontrolled hypertension.    Overall findings represent a low risk study. Echo 7/24/18  Mitral annular calcification is present. Mitral valve leaflets appear thickened. Mild mitral regurgitation. The left atrium is dilated. Mild-to-moderate tricuspid regurgitation with RVSP of 56 mmHg c/w moderate  pulmonary HTN. The right atrium is dilated. Inferior vena cava appears dilated. Assessment:  1. Chronic Atrial Fibrillation  2. Essential Hypertension  3.  Coronary Calcifications, chest

## 2018-09-28 ENCOUNTER — HOSPITAL ENCOUNTER (OUTPATIENT)
Dept: VASCULAR LAB | Age: 73
Discharge: HOME | End: 2018-09-28
Payer: MEDICARE

## 2018-09-28 DIAGNOSIS — I73.9 CLAUDICATION (HCC): ICD-10-CM

## 2018-09-28 PROCEDURE — 93925 LOWER EXTREMITY STUDY: CPT

## 2018-10-04 ENCOUNTER — TELEPHONE (OUTPATIENT)
Dept: CARDIOLOGY CLINIC | Age: 73
End: 2018-10-04

## 2018-10-04 NOTE — TELEPHONE ENCOUNTER
Discussed with Dr. Hawa Marks that patient did not want to pursue peripheral angiogram at this time. He would like to see the patient back in office in 2 months. Called and spoke to patient regarding scheduling appointment. Patient scheduled for 12/5/18 at 1:30.

## 2018-11-06 ENCOUNTER — OFFICE VISIT (OUTPATIENT)
Dept: FAMILY MEDICINE CLINIC | Age: 73
End: 2018-11-06
Payer: MEDICARE

## 2018-11-06 VITALS
WEIGHT: 198 LBS | DIASTOLIC BLOOD PRESSURE: 80 MMHG | HEIGHT: 70 IN | BODY MASS INDEX: 28.35 KG/M2 | SYSTOLIC BLOOD PRESSURE: 124 MMHG

## 2018-11-06 DIAGNOSIS — E11.42 TYPE 2 DIABETES MELLITUS WITH DIABETIC POLYNEUROPATHY, WITHOUT LONG-TERM CURRENT USE OF INSULIN (HCC): Primary | ICD-10-CM

## 2018-11-06 DIAGNOSIS — D12.6 BENIGN NEOPLASM OF COLON, UNSPECIFIED PART OF COLON: ICD-10-CM

## 2018-11-06 DIAGNOSIS — E78.2 MIXED HYPERLIPIDEMIA: ICD-10-CM

## 2018-11-06 DIAGNOSIS — I10 ESSENTIAL HYPERTENSION: ICD-10-CM

## 2018-11-06 LAB
ANION GAP SERPL CALCULATED.3IONS-SCNC: 22 MMOL/L (ref 3–16)
BUN BLDV-MCNC: 13 MG/DL (ref 7–20)
CALCIUM SERPL-MCNC: 9.8 MG/DL (ref 8.3–10.6)
CHLORIDE BLD-SCNC: 94 MMOL/L (ref 99–110)
CHOLESTEROL, TOTAL: 97 MG/DL (ref 0–199)
CO2: 23 MMOL/L (ref 21–32)
CREAT SERPL-MCNC: 1.3 MG/DL (ref 0.8–1.3)
FERRITIN: 31.1 NG/ML (ref 30–400)
GFR AFRICAN AMERICAN: >60
GFR NON-AFRICAN AMERICAN: 54
GLUCOSE BLD-MCNC: 130 MG/DL (ref 70–99)
HCT VFR BLD CALC: 38.5 % (ref 40.5–52.5)
HDLC SERPL-MCNC: 46 MG/DL (ref 40–60)
HEMOGLOBIN: 12.9 G/DL (ref 13.5–17.5)
LDL CHOLESTEROL CALCULATED: 33 MG/DL
MCH RBC QN AUTO: 31 PG (ref 26–34)
MCHC RBC AUTO-ENTMCNC: 33.4 G/DL (ref 31–36)
MCV RBC AUTO: 92.8 FL (ref 80–100)
PDW BLD-RTO: 13.6 % (ref 12.4–15.4)
PLATELET # BLD: 272 K/UL (ref 135–450)
PMV BLD AUTO: 8.5 FL (ref 5–10.5)
POTASSIUM SERPL-SCNC: 5.1 MMOL/L (ref 3.5–5.1)
RBC # BLD: 4.15 M/UL (ref 4.2–5.9)
SODIUM BLD-SCNC: 139 MMOL/L (ref 136–145)
TRIGL SERPL-MCNC: 89 MG/DL (ref 0–150)
TSH REFLEX: 3.6 UIU/ML (ref 0.27–4.2)
VLDLC SERPL CALC-MCNC: 18 MG/DL
WBC # BLD: 8.1 K/UL (ref 4–11)

## 2018-11-06 PROCEDURE — 4040F PNEUMOC VAC/ADMIN/RCVD: CPT | Performed by: FAMILY MEDICINE

## 2018-11-06 PROCEDURE — G8482 FLU IMMUNIZE ORDER/ADMIN: HCPCS | Performed by: FAMILY MEDICINE

## 2018-11-06 PROCEDURE — 99213 OFFICE O/P EST LOW 20 MIN: CPT | Performed by: FAMILY MEDICINE

## 2018-11-06 PROCEDURE — 1101F PT FALLS ASSESS-DOCD LE1/YR: CPT | Performed by: FAMILY MEDICINE

## 2018-11-06 PROCEDURE — G8599 NO ASA/ANTIPLAT THER USE RNG: HCPCS | Performed by: FAMILY MEDICINE

## 2018-11-06 PROCEDURE — 1123F ACP DISCUSS/DSCN MKR DOCD: CPT | Performed by: FAMILY MEDICINE

## 2018-11-06 PROCEDURE — 36415 COLL VENOUS BLD VENIPUNCTURE: CPT | Performed by: FAMILY MEDICINE

## 2018-11-06 PROCEDURE — 2022F DILAT RTA XM EVC RTNOPTHY: CPT | Performed by: FAMILY MEDICINE

## 2018-11-06 PROCEDURE — G8417 CALC BMI ABV UP PARAM F/U: HCPCS | Performed by: FAMILY MEDICINE

## 2018-11-06 PROCEDURE — 1036F TOBACCO NON-USER: CPT | Performed by: FAMILY MEDICINE

## 2018-11-06 PROCEDURE — 3017F COLORECTAL CA SCREEN DOC REV: CPT | Performed by: FAMILY MEDICINE

## 2018-11-06 PROCEDURE — 3044F HG A1C LEVEL LT 7.0%: CPT | Performed by: FAMILY MEDICINE

## 2018-11-06 PROCEDURE — G8427 DOCREV CUR MEDS BY ELIG CLIN: HCPCS | Performed by: FAMILY MEDICINE

## 2018-11-06 ASSESSMENT — ENCOUNTER SYMPTOMS
WHEEZING: 0
COUGH: 0
ABDOMINAL PAIN: 0
EYE PAIN: 0

## 2018-11-06 ASSESSMENT — PATIENT HEALTH QUESTIONNAIRE - PHQ9
SUM OF ALL RESPONSES TO PHQ QUESTIONS 1-9: 0
1. LITTLE INTEREST OR PLEASURE IN DOING THINGS: 0
2. FEELING DOWN, DEPRESSED OR HOPELESS: 0
SUM OF ALL RESPONSES TO PHQ9 QUESTIONS 1 & 2: 0
SUM OF ALL RESPONSES TO PHQ QUESTIONS 1-9: 0

## 2018-11-06 NOTE — PROGRESS NOTES
Subjective:      Patient ID: Josiah Enriquez is a 75 y.o. male. HPI  Here to review lab from [facility]    Glu 160-190    hgb mildly lowered around 11.5   denies ang abd pain or rectal bleed or melena   hx of colonic polyps     Colectomy due to extgent of polyps    occ glu monitoring at home   appetite remains good   no chest pain   some exrtional dyspnea    cardiolologist regularly      In need of a1c and lipiids      Hemoglobin A1C (%)   Date Value   11/06/2018 6.9     Microalbumin, Random Urine (mg/dL)   Date Value   02/16/2017 <1.20     LDL Calculated (mg/dL)   Date Value   11/06/2018 33                Current Outpatient Prescriptions   Medication Sig      furosemide (LASIX) 20 MG tablet 2 tabs in the morning and 1 tab in the late afternoon      cloNIDine (CATAPRES) 0.1 MG tablet Take 1 tablet by mouth 2 times daily      lisinopril (PRINIVIL;ZESTRIL) 5 MG tablet Take 1 tablet by mouth daily      carvedilol (COREG) 25 MG tablet Take 1 tablet by mouth 2 times daily      dabigatran (PRADAXA) 150 MG capsule Take 1 capsule by mouth 2 times daily      metFORMIN (GLUCOPHAGE) 500 MG tablet TAKE ONE TABLET BY MOUTH TWICE A DAY WITH MEALS      simvastatin (ZOCOR) 20 MG tablet Take 1 tablet by mouth nightly      loperamide (IMODIUM) 2 MG capsule Take 1 capsule by mouth as needed. No current facility-administered medications for this visit. Review of Systems   Constitutional: Negative for appetite change, fatigue and unexpected weight change. Eyes: Negative for pain and visual disturbance. Respiratory: Positive for shortness of breath. Negative for cough and wheezing. Cardiovascular: Negative for chest pain, palpitations and leg swelling. Gastrointestinal: Negative for abdominal pain, blood in stool, diarrhea and vomiting. Endocrine: Negative for polyuria. Genitourinary: Negative for difficulty urinating. Neurological: Negative for speech difficulty, numbness and headaches.

## 2018-11-07 LAB
ESTIMATED AVERAGE GLUCOSE: 151.3 MG/DL
HBA1C MFR BLD: 6.9 %

## 2018-11-08 ASSESSMENT — ENCOUNTER SYMPTOMS
VOMITING: 0
SHORTNESS OF BREATH: 1
BLOOD IN STOOL: 0
DIARRHEA: 0

## 2018-12-05 NOTE — PROGRESS NOTES
[address]  1945 December 5, 2018    The patient is 75 y.o. male with a past medical history significant for hypertension, hyperlipidemia and DM II here today as a new patient for cardiology evaluation for new onset of atrial fibrillation. I performed a cardioversion for him on 6/3/15 and he is on Pradaxa for anticoagulation. He returns to the office today in follow-up. Since last seeing Christina Hidalgo he reports feeling well overall. He states his legs feel \"fine\". He does report some pain in his buttocks when walking uphill or for an extended period of time. This presents after 15 minutes of walking. He reports feeling very congested recently. He has a chronic cough and this tends to worsen after he eats. He is able to lay flat to sleep at night but prefers his side. He is compliant with his medications and is tolerating them well. He denies any abnormal bruising or bleeding. Review of Systems:  Denies any PND or orthopnea. Denies any cough productive or otherwise. No recent change in weight. No recent changes in bowel or bladder habits. No easy bleeding or bruising. Denies any arthralgias or myalgias. Review of systems is negative to a 12 point review except as noted above.        No Known Allergies   Current Outpatient Prescriptions   Medication Sig Dispense Refill    furosemide (LASIX) 20 MG tablet 2 tabs in the morning and 1 tab in the late afternoon 120 tablet 3    cloNIDine (CATAPRES) 0.1 MG tablet Take 1 tablet by mouth 2 times daily 60 tablet 3    lisinopril (PRINIVIL;ZESTRIL) 5 MG tablet Take 1 tablet by mouth daily 30 tablet 6    carvedilol (COREG) 25 MG tablet Take 1 tablet by mouth 2 times daily 60 tablet 6    dabigatran (PRADAXA) 150 MG capsule Take 1 capsule by mouth 2 times daily 60 capsule 6    metFORMIN (GLUCOPHAGE) 500 MG tablet TAKE ONE TABLET BY MOUTH TWICE A DAY WITH MEALS 180 tablet 0    simvastatin (ZOCOR) 20 MG tablet Take 1

## 2019-01-01 ENCOUNTER — APPOINTMENT (OUTPATIENT)
Dept: GENERAL RADIOLOGY | Age: 74
DRG: 871 | End: 2019-01-01
Payer: MEDICARE

## 2019-01-01 ENCOUNTER — APPOINTMENT (OUTPATIENT)
Dept: CT IMAGING | Age: 74
DRG: 871 | End: 2019-01-01
Payer: MEDICARE

## 2019-01-01 ENCOUNTER — TELEPHONE (OUTPATIENT)
Dept: CARDIOLOGY CLINIC | Age: 74
End: 2019-01-01

## 2019-01-01 ENCOUNTER — APPOINTMENT (OUTPATIENT)
Dept: GENERAL RADIOLOGY | Age: 74
DRG: 947 | End: 2019-01-01
Attending: PHYSICAL MEDICINE & REHABILITATION
Payer: MEDICARE

## 2019-01-01 ENCOUNTER — HOSPITAL ENCOUNTER (INPATIENT)
Age: 74
LOS: 2 days | DRG: 871 | End: 2019-11-09
Attending: EMERGENCY MEDICINE | Admitting: HOSPITALIST
Payer: MEDICARE

## 2019-01-01 ENCOUNTER — TELEPHONE (OUTPATIENT)
Dept: ORTHOPEDIC SURGERY | Age: 74
End: 2019-01-01

## 2019-01-01 ENCOUNTER — CARE COORDINATION (OUTPATIENT)
Dept: CASE MANAGEMENT | Age: 74
End: 2019-01-01

## 2019-01-01 ENCOUNTER — OFFICE VISIT (OUTPATIENT)
Dept: FAMILY MEDICINE CLINIC | Age: 74
End: 2019-01-01
Payer: MEDICARE

## 2019-01-01 ENCOUNTER — HOSPITAL ENCOUNTER (INPATIENT)
Age: 74
LOS: 9 days | Discharge: NURSING, REHAB OR LONG-TERM CARE | DRG: 871 | End: 2019-09-05
Attending: EMERGENCY MEDICINE | Admitting: INTERNAL MEDICINE
Payer: MEDICARE

## 2019-01-01 ENCOUNTER — OFFICE VISIT (OUTPATIENT)
Dept: ORTHOPEDIC SURGERY | Age: 74
End: 2019-01-01
Payer: MEDICARE

## 2019-01-01 ENCOUNTER — HOSPITAL ENCOUNTER (OUTPATIENT)
Dept: VASCULAR LAB | Age: 74
Discharge: HOME | End: 2019-05-02
Payer: MEDICARE

## 2019-01-01 ENCOUNTER — TELEPHONE (OUTPATIENT)
Dept: FAMILY MEDICINE CLINIC | Age: 74
End: 2019-01-01

## 2019-01-01 ENCOUNTER — HOSPITAL ENCOUNTER (INPATIENT)
Age: 74
LOS: 12 days | Discharge: HOME HEALTH | DRG: 947 | End: 2019-09-17
Attending: PHYSICAL MEDICINE & REHABILITATION | Admitting: PHYSICAL MEDICINE & REHABILITATION
Payer: MEDICARE

## 2019-01-01 ENCOUNTER — OFFICE VISIT (OUTPATIENT)
Dept: CARDIOLOGY CLINIC | Age: 74
End: 2019-01-01
Payer: MEDICARE

## 2019-01-01 VITALS
HEART RATE: 83 BPM | DIASTOLIC BLOOD PRESSURE: 76 MMHG | SYSTOLIC BLOOD PRESSURE: 126 MMHG | OXYGEN SATURATION: 98 % | TEMPERATURE: 97.9 F | BODY MASS INDEX: 27 KG/M2 | HEIGHT: 69 IN | RESPIRATION RATE: 18 BRPM | WEIGHT: 182.32 LBS

## 2019-01-01 VITALS
HEART RATE: 71 BPM | SYSTOLIC BLOOD PRESSURE: 160 MMHG | DIASTOLIC BLOOD PRESSURE: 80 MMHG | BODY MASS INDEX: 28.2 KG/M2 | WEIGHT: 197 LBS | HEIGHT: 70 IN

## 2019-01-01 VITALS
HEART RATE: 64 BPM | BODY MASS INDEX: 27.2 KG/M2 | WEIGHT: 190 LBS | DIASTOLIC BLOOD PRESSURE: 84 MMHG | SYSTOLIC BLOOD PRESSURE: 134 MMHG | HEIGHT: 70 IN | OXYGEN SATURATION: 97 %

## 2019-01-01 VITALS
HEIGHT: 70 IN | DIASTOLIC BLOOD PRESSURE: 90 MMHG | SYSTOLIC BLOOD PRESSURE: 160 MMHG | BODY MASS INDEX: 28.2 KG/M2 | WEIGHT: 197 LBS

## 2019-01-01 VITALS
WEIGHT: 197 LBS | HEIGHT: 70 IN | SYSTOLIC BLOOD PRESSURE: 194 MMHG | BODY MASS INDEX: 28.2 KG/M2 | HEART RATE: 82 BPM | DIASTOLIC BLOOD PRESSURE: 73 MMHG

## 2019-01-01 VITALS
HEART RATE: 81 BPM | OXYGEN SATURATION: 96 % | SYSTOLIC BLOOD PRESSURE: 130 MMHG | WEIGHT: 194.45 LBS | TEMPERATURE: 97.6 F | HEIGHT: 69 IN | RESPIRATION RATE: 16 BRPM | BODY MASS INDEX: 28.8 KG/M2 | DIASTOLIC BLOOD PRESSURE: 74 MMHG

## 2019-01-01 VITALS
SYSTOLIC BLOOD PRESSURE: 153 MMHG | DIASTOLIC BLOOD PRESSURE: 70 MMHG | WEIGHT: 197 LBS | HEART RATE: 61 BPM | HEIGHT: 70 IN | BODY MASS INDEX: 28.2 KG/M2

## 2019-01-01 VITALS
BODY MASS INDEX: 23.34 KG/M2 | WEIGHT: 163 LBS | SYSTOLIC BLOOD PRESSURE: 126 MMHG | DIASTOLIC BLOOD PRESSURE: 80 MMHG | HEIGHT: 70 IN

## 2019-01-01 VITALS
DIASTOLIC BLOOD PRESSURE: 84 MMHG | RESPIRATION RATE: 12 BRPM | WEIGHT: 197 LBS | SYSTOLIC BLOOD PRESSURE: 142 MMHG | BODY MASS INDEX: 28.2 KG/M2 | HEART RATE: 100 BPM | HEIGHT: 70 IN

## 2019-01-01 DIAGNOSIS — I48.20 CHRONIC ATRIAL FIBRILLATION (HCC): Primary | ICD-10-CM

## 2019-01-01 DIAGNOSIS — E78.5 HYPERLIPIDEMIA: ICD-10-CM

## 2019-01-01 DIAGNOSIS — I82.442 ACUTE DEEP VEIN THROMBOSIS (DVT) OF TIBIAL VEIN OF LEFT LOWER EXTREMITY (HCC): Primary | ICD-10-CM

## 2019-01-01 DIAGNOSIS — R06.82 TACHYPNEA: ICD-10-CM

## 2019-01-01 DIAGNOSIS — I25.10 CORONARY ARTERY CALCIFICATION SEEN ON CT SCAN: ICD-10-CM

## 2019-01-01 DIAGNOSIS — Z99.81 HYPOXEMIA REQUIRING SUPPLEMENTAL OXYGEN: Primary | ICD-10-CM

## 2019-01-01 DIAGNOSIS — E11.42 TYPE 2 DIABETES MELLITUS WITH DIABETIC POLYNEUROPATHY, WITHOUT LONG-TERM CURRENT USE OF INSULIN (HCC): Primary | ICD-10-CM

## 2019-01-01 DIAGNOSIS — I10 ESSENTIAL HYPERTENSION: ICD-10-CM

## 2019-01-01 DIAGNOSIS — M17.11 ARTHRITIS OF RIGHT KNEE: Primary | ICD-10-CM

## 2019-01-01 DIAGNOSIS — I27.20 PULMONARY HYPERTENSION (HCC): ICD-10-CM

## 2019-01-01 DIAGNOSIS — E86.0 DEHYDRATION: ICD-10-CM

## 2019-01-01 DIAGNOSIS — M17.12 ARTHROPATHY OF LEFT KNEE: Primary | ICD-10-CM

## 2019-01-01 DIAGNOSIS — I73.9 INTERMITTENT CLAUDICATION (HCC): ICD-10-CM

## 2019-01-01 DIAGNOSIS — E87.20 LACTIC ACIDOSIS: ICD-10-CM

## 2019-01-01 DIAGNOSIS — M17.11 ARTHRITIS OF RIGHT KNEE: ICD-10-CM

## 2019-01-01 DIAGNOSIS — R53.83 OTHER FATIGUE: ICD-10-CM

## 2019-01-01 DIAGNOSIS — M25.561 ACUTE PAIN OF RIGHT KNEE: Primary | ICD-10-CM

## 2019-01-01 DIAGNOSIS — R09.02 HYPOXEMIA REQUIRING SUPPLEMENTAL OXYGEN: Primary | ICD-10-CM

## 2019-01-01 DIAGNOSIS — R53.1 GENERALIZED WEAKNESS: ICD-10-CM

## 2019-01-01 DIAGNOSIS — M25.562 ACUTE PAIN OF LEFT KNEE: Primary | ICD-10-CM

## 2019-01-01 DIAGNOSIS — M17.12 ARTHRITIS OF LEFT KNEE: ICD-10-CM

## 2019-01-01 DIAGNOSIS — M25.561 ACUTE PAIN OF RIGHT KNEE: ICD-10-CM

## 2019-01-01 DIAGNOSIS — I48.11 LONGSTANDING PERSISTENT ATRIAL FIBRILLATION (HCC): ICD-10-CM

## 2019-01-01 DIAGNOSIS — M17.12 ARTHROPATHY OF LEFT KNEE: ICD-10-CM

## 2019-01-01 DIAGNOSIS — I48.20 CHRONIC ATRIAL FIBRILLATION (HCC): ICD-10-CM

## 2019-01-01 DIAGNOSIS — E11.42 TYPE 2 DIABETES MELLITUS WITH DIABETIC POLYNEUROPATHY, WITHOUT LONG-TERM CURRENT USE OF INSULIN (HCC): ICD-10-CM

## 2019-01-01 DIAGNOSIS — M17.0 ARTHRITIS OF BOTH KNEES: Primary | ICD-10-CM

## 2019-01-01 DIAGNOSIS — I95.9 HYPOTENSION, UNSPECIFIED HYPOTENSION TYPE: Primary | ICD-10-CM

## 2019-01-01 DIAGNOSIS — E78.5 HYPERLIPIDEMIA LDL GOAL <70: ICD-10-CM

## 2019-01-01 DIAGNOSIS — M23.92 ACUTE INTERNAL DERANGEMENT OF LEFT KNEE: ICD-10-CM

## 2019-01-01 LAB
A/G RATIO: 1.2 (ref 1.1–2.2)
A/G RATIO: 1.3 (ref 1.1–2.2)
A/G RATIO: 1.4 (ref 1.1–2.2)
A/G RATIO: 1.4 (ref 1.1–2.2)
A/G RATIO: 1.5 (ref 1.1–2.2)
ALBUMIN SERPL-MCNC: 2.8 G/DL (ref 3.4–5)
ALBUMIN SERPL-MCNC: 2.8 G/DL (ref 3.4–5)
ALBUMIN SERPL-MCNC: 3.1 G/DL (ref 3.4–5)
ALBUMIN SERPL-MCNC: 3.1 G/DL (ref 3.4–5)
ALBUMIN SERPL-MCNC: 3.3 G/DL (ref 3.4–5)
ALBUMIN SERPL-MCNC: 3.4 G/DL (ref 3.4–5)
ALBUMIN SERPL-MCNC: 3.4 G/DL (ref 3.4–5)
ALBUMIN SERPL-MCNC: 3.6 G/DL (ref 3.4–5)
ALBUMIN SERPL-MCNC: 3.7 G/DL (ref 3.4–5)
ALBUMIN SERPL-MCNC: 3.7 G/DL (ref 3.4–5)
ALBUMIN SERPL-MCNC: 4 G/DL (ref 3.4–5)
ALP BLD-CCNC: 123 U/L (ref 40–129)
ALP BLD-CCNC: 173 U/L (ref 40–129)
ALP BLD-CCNC: 183 U/L (ref 40–129)
ALP BLD-CCNC: 187 U/L (ref 40–129)
ALP BLD-CCNC: 212 U/L (ref 40–129)
ALP BLD-CCNC: 212 U/L (ref 40–129)
ALP BLD-CCNC: 223 U/L (ref 40–129)
ALT SERPL-CCNC: 10 U/L (ref 10–40)
ALT SERPL-CCNC: 11 U/L (ref 10–40)
ALT SERPL-CCNC: 14 U/L (ref 10–40)
ALT SERPL-CCNC: 15 U/L (ref 10–40)
ALT SERPL-CCNC: 15 U/L (ref 10–40)
ANION GAP SERPL CALCULATED.3IONS-SCNC: 10 MMOL/L (ref 3–16)
ANION GAP SERPL CALCULATED.3IONS-SCNC: 10 MMOL/L (ref 3–16)
ANION GAP SERPL CALCULATED.3IONS-SCNC: 11 MMOL/L (ref 3–16)
ANION GAP SERPL CALCULATED.3IONS-SCNC: 11 MMOL/L (ref 3–16)
ANION GAP SERPL CALCULATED.3IONS-SCNC: 12 MMOL/L (ref 3–16)
ANION GAP SERPL CALCULATED.3IONS-SCNC: 12 MMOL/L (ref 3–16)
ANION GAP SERPL CALCULATED.3IONS-SCNC: 13 MMOL/L (ref 3–16)
ANION GAP SERPL CALCULATED.3IONS-SCNC: 14 MMOL/L (ref 3–16)
ANION GAP SERPL CALCULATED.3IONS-SCNC: 14 MMOL/L (ref 3–16)
ANION GAP SERPL CALCULATED.3IONS-SCNC: 15 MMOL/L (ref 3–16)
ANION GAP SERPL CALCULATED.3IONS-SCNC: 15 MMOL/L (ref 3–16)
ANION GAP SERPL CALCULATED.3IONS-SCNC: 16 MMOL/L (ref 3–16)
ANION GAP SERPL CALCULATED.3IONS-SCNC: 16 MMOL/L (ref 3–16)
ANION GAP SERPL CALCULATED.3IONS-SCNC: 19 MMOL/L (ref 3–16)
ANION GAP SERPL CALCULATED.3IONS-SCNC: 24 MMOL/L (ref 3–16)
ANISOCYTOSIS: ABNORMAL
ANISOCYTOSIS: ABNORMAL
APPEARANCE FLUID: NORMAL
AST SERPL-CCNC: 15 U/L (ref 15–37)
AST SERPL-CCNC: 16 U/L (ref 15–37)
AST SERPL-CCNC: 16 U/L (ref 15–37)
AST SERPL-CCNC: 17 U/L (ref 15–37)
AST SERPL-CCNC: 18 U/L (ref 15–37)
AST SERPL-CCNC: 20 U/L (ref 15–37)
AST SERPL-CCNC: 24 U/L (ref 15–37)
BANDED NEUTROPHILS RELATIVE PERCENT: 4 % (ref 0–7)
BANDED NEUTROPHILS RELATIVE PERCENT: 5 % (ref 0–7)
BASE EXCESS ARTERIAL: 2 (ref -3–3)
BASE EXCESS ARTERIAL: 4.5 MMOL/L (ref -3–3)
BASOPHILS ABSOLUTE: 0 K/UL (ref 0–0.2)
BASOPHILS ABSOLUTE: 0.1 K/UL (ref 0–0.2)
BASOPHILS ABSOLUTE: 0.2 K/UL (ref 0–0.2)
BASOPHILS ABSOLUTE: 0.2 K/UL (ref 0–0.2)
BASOPHILS ABSOLUTE: 0.4 K/UL (ref 0–0.2)
BASOPHILS RELATIVE PERCENT: 0 %
BASOPHILS RELATIVE PERCENT: 0.1 %
BASOPHILS RELATIVE PERCENT: 0.5 %
BASOPHILS RELATIVE PERCENT: 0.6 %
BASOPHILS RELATIVE PERCENT: 0.7 %
BASOPHILS RELATIVE PERCENT: 0.8 %
BASOPHILS RELATIVE PERCENT: 0.8 %
BASOPHILS RELATIVE PERCENT: 1 %
BASOPHILS RELATIVE PERCENT: 1.4 %
BASOPHILS RELATIVE PERCENT: 1.6 %
BASOPHILS RELATIVE PERCENT: 1.9 %
BILIRUB SERPL-MCNC: 0.9 MG/DL (ref 0–1)
BILIRUB SERPL-MCNC: 0.9 MG/DL (ref 0–1)
BILIRUB SERPL-MCNC: 1 MG/DL (ref 0–1)
BILIRUB SERPL-MCNC: 1 MG/DL (ref 0–1)
BILIRUB SERPL-MCNC: 1.2 MG/DL (ref 0–1)
BILIRUB SERPL-MCNC: 1.3 MG/DL (ref 0–1)
BILIRUB SERPL-MCNC: 1.5 MG/DL (ref 0–1)
BILIRUBIN URINE: NEGATIVE
BILIRUBIN URINE: NEGATIVE
BLOOD CULTURE, ROUTINE: NORMAL
BLOOD, URINE: NEGATIVE
BLOOD, URINE: NEGATIVE
BODY FLUID CULTURE, STERILE: NORMAL
BUN BLDV-MCNC: 14 MG/DL (ref 7–20)
BUN BLDV-MCNC: 15 MG/DL (ref 7–20)
BUN BLDV-MCNC: 15 MG/DL (ref 7–20)
BUN BLDV-MCNC: 16 MG/DL (ref 7–20)
BUN BLDV-MCNC: 16 MG/DL (ref 7–20)
BUN BLDV-MCNC: 19 MG/DL (ref 7–20)
BUN BLDV-MCNC: 21 MG/DL (ref 7–20)
BUN BLDV-MCNC: 23 MG/DL (ref 7–20)
BUN BLDV-MCNC: 26 MG/DL (ref 7–20)
BUN BLDV-MCNC: 30 MG/DL (ref 7–20)
BUN BLDV-MCNC: 33 MG/DL (ref 7–20)
BUN BLDV-MCNC: 34 MG/DL (ref 7–20)
BUN BLDV-MCNC: 35 MG/DL (ref 7–20)
BUN BLDV-MCNC: 38 MG/DL (ref 7–20)
BUN BLDV-MCNC: 39 MG/DL (ref 7–20)
C-REACTIVE PROTEIN: 124.3 MG/L (ref 0–5.1)
CALCIUM IONIZED: 0.99 MMOL/L (ref 1.12–1.32)
CALCIUM SERPL-MCNC: 7.2 MG/DL (ref 8.3–10.6)
CALCIUM SERPL-MCNC: 7.7 MG/DL (ref 8.3–10.6)
CALCIUM SERPL-MCNC: 8.1 MG/DL (ref 8.3–10.6)
CALCIUM SERPL-MCNC: 8.2 MG/DL (ref 8.3–10.6)
CALCIUM SERPL-MCNC: 8.3 MG/DL (ref 8.3–10.6)
CALCIUM SERPL-MCNC: 8.4 MG/DL (ref 8.3–10.6)
CALCIUM SERPL-MCNC: 8.7 MG/DL (ref 8.3–10.6)
CALCIUM SERPL-MCNC: 8.7 MG/DL (ref 8.3–10.6)
CALCIUM SERPL-MCNC: 8.8 MG/DL (ref 8.3–10.6)
CALCIUM SERPL-MCNC: 8.9 MG/DL (ref 8.3–10.6)
CALCIUM SERPL-MCNC: 9 MG/DL (ref 8.3–10.6)
CALCIUM SERPL-MCNC: 9.2 MG/DL (ref 8.3–10.6)
CARBOXYHEMOGLOBIN ARTERIAL: 1.9 % (ref 0–1.5)
CELL COUNT FLUID TYPE: NORMAL
CHLORIDE BLD-SCNC: 100 MMOL/L (ref 99–110)
CHLORIDE BLD-SCNC: 102 MMOL/L (ref 99–110)
CHLORIDE BLD-SCNC: 102 MMOL/L (ref 99–110)
CHLORIDE BLD-SCNC: 103 MMOL/L (ref 99–110)
CHLORIDE BLD-SCNC: 104 MMOL/L (ref 99–110)
CHLORIDE BLD-SCNC: 107 MMOL/L (ref 99–110)
CHLORIDE BLD-SCNC: 85 MMOL/L (ref 99–110)
CHLORIDE BLD-SCNC: 97 MMOL/L (ref 99–110)
CHLORIDE BLD-SCNC: 98 MMOL/L (ref 99–110)
CHLORIDE BLD-SCNC: 99 MMOL/L (ref 99–110)
CHLORIDE BLD-SCNC: 99 MMOL/L (ref 99–110)
CLARITY: CLEAR
CLARITY: CLEAR
CLOT EVALUATION: NORMAL
CO2: 14 MMOL/L (ref 21–32)
CO2: 22 MMOL/L (ref 21–32)
CO2: 22 MMOL/L (ref 21–32)
CO2: 23 MMOL/L (ref 21–32)
CO2: 24 MMOL/L (ref 21–32)
CO2: 24 MMOL/L (ref 21–32)
CO2: 25 MMOL/L (ref 21–32)
CO2: 26 MMOL/L (ref 21–32)
CO2: 27 MMOL/L (ref 21–32)
CO2: 27 MMOL/L (ref 21–32)
CO2: 28 MMOL/L (ref 21–32)
CO2: 29 MMOL/L (ref 21–32)
COLOR FLUID: YELLOW
COLOR: YELLOW
COLOR: YELLOW
CORTISOL TOTAL: 11.2 UG/DL
CREAT SERPL-MCNC: 0.8 MG/DL (ref 0.8–1.3)
CREAT SERPL-MCNC: 1 MG/DL (ref 0.8–1.3)
CREAT SERPL-MCNC: 1.1 MG/DL (ref 0.8–1.3)
CREAT SERPL-MCNC: 1.2 MG/DL (ref 0.8–1.3)
CREAT SERPL-MCNC: 1.2 MG/DL (ref 0.8–1.3)
CREAT SERPL-MCNC: 1.3 MG/DL (ref 0.8–1.3)
CREAT SERPL-MCNC: 1.3 MG/DL (ref 0.8–1.3)
CREAT SERPL-MCNC: 1.4 MG/DL (ref 0.8–1.3)
CRYSTALS, FLUID: NORMAL
CULTURE, BLOOD 2: NORMAL
EKG ATRIAL RATE: 102 BPM
EKG ATRIAL RATE: 85 BPM
EKG ATRIAL RATE: 96 BPM
EKG DIAGNOSIS: NORMAL
EKG Q-T INTERVAL: 290 MS
EKG Q-T INTERVAL: 326 MS
EKG Q-T INTERVAL: 406 MS
EKG Q-T INTERVAL: 418 MS
EKG QRS DURATION: 74 MS
EKG QRS DURATION: 78 MS
EKG QRS DURATION: 78 MS
EKG QRS DURATION: 86 MS
EKG QTC CALCULATION (BAZETT): 397 MS
EKG QTC CALCULATION (BAZETT): 456 MS
EKG QTC CALCULATION (BAZETT): 502 MS
EKG QTC CALCULATION (BAZETT): 505 MS
EKG R AXIS: -16 DEGREES
EKG R AXIS: 14 DEGREES
EKG R AXIS: 27 DEGREES
EKG R AXIS: 40 DEGREES
EKG T AXIS: -32 DEGREES
EKG T AXIS: -44 DEGREES
EKG T AXIS: -88 DEGREES
EKG T AXIS: 0 DEGREES
EKG VENTRICULAR RATE: 113 BPM
EKG VENTRICULAR RATE: 118 BPM
EKG VENTRICULAR RATE: 88 BPM
EKG VENTRICULAR RATE: 92 BPM
EOSINOPHILS ABSOLUTE: 0 K/UL (ref 0–0.6)
EOSINOPHILS ABSOLUTE: 0.1 K/UL (ref 0–0.6)
EOSINOPHILS ABSOLUTE: 0.1 K/UL (ref 0–0.6)
EOSINOPHILS ABSOLUTE: 0.2 K/UL (ref 0–0.6)
EOSINOPHILS RELATIVE PERCENT: 0 %
EOSINOPHILS RELATIVE PERCENT: 0.1 %
EOSINOPHILS RELATIVE PERCENT: 0.5 %
EOSINOPHILS RELATIVE PERCENT: 0.6 %
EOSINOPHILS RELATIVE PERCENT: 0.7 %
EOSINOPHILS RELATIVE PERCENT: 1 %
EPITHELIAL CELLS, UA: 1 /HPF (ref 0–5)
ESTIMATED AVERAGE GLUCOSE: 151.3 MG/DL
ESTIMATED AVERAGE GLUCOSE: 151.3 MG/DL
GFR AFRICAN AMERICAN: 60
GFR AFRICAN AMERICAN: >60
GFR NON-AFRICAN AMERICAN: 49
GFR NON-AFRICAN AMERICAN: 54
GFR NON-AFRICAN AMERICAN: 54
GFR NON-AFRICAN AMERICAN: 59
GFR NON-AFRICAN AMERICAN: 59
GFR NON-AFRICAN AMERICAN: >60
GLOBULIN: 2.4 G/DL
GLOBULIN: 2.4 G/DL
GLOBULIN: 2.6 G/DL
GLOBULIN: 2.7 G/DL
GLOBULIN: 2.7 G/DL
GLOBULIN: 2.8 G/DL
GLOBULIN: 2.8 G/DL
GLUCOSE BLD-MCNC: 107 MG/DL (ref 70–99)
GLUCOSE BLD-MCNC: 110 MG/DL (ref 70–99)
GLUCOSE BLD-MCNC: 113 MG/DL (ref 70–99)
GLUCOSE BLD-MCNC: 115 MG/DL (ref 70–99)
GLUCOSE BLD-MCNC: 115 MG/DL (ref 70–99)
GLUCOSE BLD-MCNC: 117 MG/DL (ref 70–99)
GLUCOSE BLD-MCNC: 117 MG/DL (ref 70–99)
GLUCOSE BLD-MCNC: 118 MG/DL (ref 70–99)
GLUCOSE BLD-MCNC: 119 MG/DL (ref 70–99)
GLUCOSE BLD-MCNC: 119 MG/DL (ref 70–99)
GLUCOSE BLD-MCNC: 121 MG/DL (ref 70–99)
GLUCOSE BLD-MCNC: 122 MG/DL (ref 70–99)
GLUCOSE BLD-MCNC: 123 MG/DL (ref 70–99)
GLUCOSE BLD-MCNC: 124 MG/DL (ref 70–99)
GLUCOSE BLD-MCNC: 124 MG/DL (ref 70–99)
GLUCOSE BLD-MCNC: 125 MG/DL (ref 70–99)
GLUCOSE BLD-MCNC: 125 MG/DL (ref 70–99)
GLUCOSE BLD-MCNC: 126 MG/DL (ref 70–99)
GLUCOSE BLD-MCNC: 128 MG/DL (ref 70–99)
GLUCOSE BLD-MCNC: 129 MG/DL (ref 70–99)
GLUCOSE BLD-MCNC: 131 MG/DL (ref 70–99)
GLUCOSE BLD-MCNC: 135 MG/DL (ref 70–99)
GLUCOSE BLD-MCNC: 136 MG/DL (ref 70–99)
GLUCOSE BLD-MCNC: 141 MG/DL (ref 70–99)
GLUCOSE BLD-MCNC: 141 MG/DL (ref 70–99)
GLUCOSE BLD-MCNC: 142 MG/DL (ref 70–99)
GLUCOSE BLD-MCNC: 142 MG/DL (ref 70–99)
GLUCOSE BLD-MCNC: 145 MG/DL (ref 70–99)
GLUCOSE BLD-MCNC: 146 MG/DL (ref 70–99)
GLUCOSE BLD-MCNC: 147 MG/DL (ref 70–99)
GLUCOSE BLD-MCNC: 149 MG/DL (ref 70–99)
GLUCOSE BLD-MCNC: 150 MG/DL (ref 70–99)
GLUCOSE BLD-MCNC: 151 MG/DL (ref 70–99)
GLUCOSE BLD-MCNC: 152 MG/DL (ref 70–99)
GLUCOSE BLD-MCNC: 152 MG/DL (ref 70–99)
GLUCOSE BLD-MCNC: 155 MG/DL (ref 70–99)
GLUCOSE BLD-MCNC: 159 MG/DL (ref 70–99)
GLUCOSE BLD-MCNC: 159 MG/DL (ref 70–99)
GLUCOSE BLD-MCNC: 160 MG/DL (ref 70–99)
GLUCOSE BLD-MCNC: 161 MG/DL (ref 70–99)
GLUCOSE BLD-MCNC: 161 MG/DL (ref 70–99)
GLUCOSE BLD-MCNC: 163 MG/DL (ref 70–99)
GLUCOSE BLD-MCNC: 164 MG/DL (ref 70–99)
GLUCOSE BLD-MCNC: 165 MG/DL (ref 70–99)
GLUCOSE BLD-MCNC: 165 MG/DL (ref 70–99)
GLUCOSE BLD-MCNC: 167 MG/DL (ref 70–99)
GLUCOSE BLD-MCNC: 169 MG/DL (ref 70–99)
GLUCOSE BLD-MCNC: 171 MG/DL (ref 70–99)
GLUCOSE BLD-MCNC: 172 MG/DL (ref 70–99)
GLUCOSE BLD-MCNC: 172 MG/DL (ref 70–99)
GLUCOSE BLD-MCNC: 174 MG/DL (ref 70–99)
GLUCOSE BLD-MCNC: 176 MG/DL (ref 70–99)
GLUCOSE BLD-MCNC: 177 MG/DL (ref 70–99)
GLUCOSE BLD-MCNC: 177 MG/DL (ref 70–99)
GLUCOSE BLD-MCNC: 179 MG/DL (ref 70–99)
GLUCOSE BLD-MCNC: 179 MG/DL (ref 70–99)
GLUCOSE BLD-MCNC: 180 MG/DL (ref 70–99)
GLUCOSE BLD-MCNC: 181 MG/DL (ref 70–99)
GLUCOSE BLD-MCNC: 182 MG/DL (ref 70–99)
GLUCOSE BLD-MCNC: 185 MG/DL (ref 70–99)
GLUCOSE BLD-MCNC: 186 MG/DL (ref 70–99)
GLUCOSE BLD-MCNC: 187 MG/DL (ref 70–99)
GLUCOSE BLD-MCNC: 187 MG/DL (ref 70–99)
GLUCOSE BLD-MCNC: 188 MG/DL (ref 70–99)
GLUCOSE BLD-MCNC: 189 MG/DL (ref 70–99)
GLUCOSE BLD-MCNC: 191 MG/DL (ref 70–99)
GLUCOSE BLD-MCNC: 192 MG/DL (ref 70–99)
GLUCOSE BLD-MCNC: 193 MG/DL (ref 70–99)
GLUCOSE BLD-MCNC: 195 MG/DL (ref 70–99)
GLUCOSE BLD-MCNC: 205 MG/DL (ref 70–99)
GLUCOSE BLD-MCNC: 209 MG/DL (ref 70–99)
GLUCOSE BLD-MCNC: 211 MG/DL (ref 70–99)
GLUCOSE BLD-MCNC: 213 MG/DL (ref 70–99)
GLUCOSE BLD-MCNC: 219 MG/DL (ref 70–99)
GLUCOSE BLD-MCNC: 221 MG/DL (ref 70–99)
GLUCOSE BLD-MCNC: 224 MG/DL (ref 70–99)
GLUCOSE BLD-MCNC: 228 MG/DL (ref 70–99)
GLUCOSE BLD-MCNC: 228 MG/DL (ref 70–99)
GLUCOSE BLD-MCNC: 230 MG/DL (ref 70–99)
GLUCOSE BLD-MCNC: 234 MG/DL (ref 70–99)
GLUCOSE BLD-MCNC: 235 MG/DL (ref 70–99)
GLUCOSE BLD-MCNC: 235 MG/DL (ref 70–99)
GLUCOSE BLD-MCNC: 236 MG/DL (ref 70–99)
GLUCOSE BLD-MCNC: 238 MG/DL (ref 70–99)
GLUCOSE BLD-MCNC: 238 MG/DL (ref 70–99)
GLUCOSE BLD-MCNC: 243 MG/DL (ref 70–99)
GLUCOSE BLD-MCNC: 243 MG/DL (ref 70–99)
GLUCOSE BLD-MCNC: 248 MG/DL (ref 70–99)
GLUCOSE BLD-MCNC: 259 MG/DL (ref 70–99)
GLUCOSE BLD-MCNC: 261 MG/DL (ref 70–99)
GLUCOSE BLD-MCNC: 268 MG/DL (ref 70–99)
GLUCOSE BLD-MCNC: 273 MG/DL (ref 70–99)
GLUCOSE BLD-MCNC: 289 MG/DL (ref 70–99)
GLUCOSE BLD-MCNC: 302 MG/DL (ref 70–99)
GLUCOSE BLD-MCNC: 330 MG/DL (ref 70–99)
GLUCOSE BLD-MCNC: 356 MG/DL (ref 70–99)
GLUCOSE BLD-MCNC: 367 MG/DL (ref 70–99)
GLUCOSE BLD-MCNC: 371 MG/DL (ref 70–99)
GLUCOSE BLD-MCNC: 76 MG/DL (ref 70–99)
GLUCOSE BLD-MCNC: 88 MG/DL (ref 70–99)
GLUCOSE URINE: NEGATIVE MG/DL
GLUCOSE URINE: NEGATIVE MG/DL
GRAM STAIN RESULT: NORMAL
HBA1C MFR BLD: 6.9 %
HBA1C MFR BLD: 6.9 %
HCO3 ARTERIAL: 27.7 MMOL/L (ref 21–29)
HCO3 ARTERIAL: 28.4 MMOL/L (ref 21–29)
HCT VFR BLD CALC: 28.9 % (ref 40.5–52.5)
HCT VFR BLD CALC: 30.6 % (ref 40.5–52.5)
HCT VFR BLD CALC: 30.9 % (ref 40.5–52.5)
HCT VFR BLD CALC: 31 % (ref 40.5–52.5)
HCT VFR BLD CALC: 31 % (ref 40.5–52.5)
HCT VFR BLD CALC: 31.6 % (ref 40.5–52.5)
HCT VFR BLD CALC: 31.7 % (ref 40.5–52.5)
HCT VFR BLD CALC: 33.2 % (ref 40.5–52.5)
HCT VFR BLD CALC: 33.3 % (ref 40.5–52.5)
HCT VFR BLD CALC: 33.9 % (ref 40.5–52.5)
HCT VFR BLD CALC: 34.2 % (ref 40.5–52.5)
HCT VFR BLD CALC: 34.5 % (ref 40.5–52.5)
HCT VFR BLD CALC: 34.5 % (ref 40.5–52.5)
HCT VFR BLD CALC: 35.2 % (ref 40.5–52.5)
HCT VFR BLD CALC: 35.2 % (ref 40.5–52.5)
HCT VFR BLD CALC: 36.3 % (ref 40.5–52.5)
HCT VFR BLD CALC: 37 % (ref 40.5–52.5)
HCT VFR BLD CALC: 37 % (ref 40.5–52.5)
HCT VFR BLD CALC: 37.5 % (ref 40.5–52.5)
HCT VFR BLD CALC: 38.4 % (ref 40.5–52.5)
HCT VFR BLD CALC: 41.2 % (ref 40.5–52.5)
HEMATOLOGY PATH CONSULT: NORMAL
HEMATOLOGY PATH CONSULT: YES
HEMOGLOBIN, ART, EXTENDED: 11.5 G/DL (ref 13.5–17.5)
HEMOGLOBIN: 10.2 G/DL (ref 13.5–17.5)
HEMOGLOBIN: 10.3 G/DL (ref 13.5–17.5)
HEMOGLOBIN: 10.6 G/DL (ref 13.5–17.5)
HEMOGLOBIN: 10.6 G/DL (ref 13.5–17.5)
HEMOGLOBIN: 10.7 G/DL (ref 13.5–17.5)
HEMOGLOBIN: 10.7 G/DL (ref 13.5–17.5)
HEMOGLOBIN: 11.1 G/DL (ref 13.5–17.5)
HEMOGLOBIN: 11.2 G/DL (ref 13.5–17.5)
HEMOGLOBIN: 11.2 G/DL (ref 13.5–17.5)
HEMOGLOBIN: 11.4 G/DL (ref 13.5–17.5)
HEMOGLOBIN: 11.6 G/DL (ref 13.5–17.5)
HEMOGLOBIN: 11.6 G/DL (ref 13.5–17.5)
HEMOGLOBIN: 11.7 G/DL (ref 13.5–17.5)
HEMOGLOBIN: 11.9 G/DL (ref 13.5–17.5)
HEMOGLOBIN: 12 G/DL (ref 13.5–17.5)
HEMOGLOBIN: 12.2 G/DL (ref 13.5–17.5)
HEMOGLOBIN: 12.3 G/DL (ref 13.5–17.5)
HEMOGLOBIN: 12.3 G/DL (ref 13.5–17.5)
HEMOGLOBIN: 12.7 G/DL (ref 13.5–17.5)
HEMOGLOBIN: 12.9 G/DL (ref 13.5–17.5)
HEMOGLOBIN: 13.8 GM/DL (ref 13.5–17.5)
HEMOGLOBIN: 9.2 G/DL (ref 13.5–17.5)
HYALINE CASTS: 1 /LPF (ref 0–8)
KETONES, URINE: NEGATIVE MG/DL
KETONES, URINE: NEGATIVE MG/DL
LACTATE: 12.2 MMOL/L (ref 0.4–2)
LACTIC ACID, SEPSIS: 2.1 MMOL/L (ref 0.4–1.9)
LACTIC ACID, SEPSIS: 2.1 MMOL/L (ref 0.4–1.9)
LACTIC ACID, SEPSIS: 2.7 MMOL/L (ref 0.4–1.9)
LACTIC ACID, SEPSIS: 4.5 MMOL/L (ref 0.4–1.9)
LACTIC ACID: 0.9 MMOL/L (ref 0.4–2)
LACTIC ACID: 1.1 MMOL/L (ref 0.4–2)
LACTIC ACID: 1.5 MMOL/L (ref 0.4–2)
LACTIC ACID: 2.3 MMOL/L (ref 0.4–2)
LACTIC ACID: 2.9 MMOL/L (ref 0.4–2)
LACTIC ACID: 3.5 MMOL/L (ref 0.4–2)
LACTIC ACID: 9.8 MMOL/L (ref 0.4–2)
LEUKOCYTE ESTERASE, URINE: NEGATIVE
LEUKOCYTE ESTERASE, URINE: NEGATIVE
LIPASE: 19 U/L (ref 13–60)
LV EF: 58 %
LVEF MODALITY: NORMAL
LYMPHOCYTES ABSOLUTE: 0.4 K/UL (ref 1–5.1)
LYMPHOCYTES ABSOLUTE: 0.4 K/UL (ref 1–5.1)
LYMPHOCYTES ABSOLUTE: 0.6 K/UL (ref 1–5.1)
LYMPHOCYTES ABSOLUTE: 0.6 K/UL (ref 1–5.1)
LYMPHOCYTES ABSOLUTE: 0.8 K/UL (ref 1–5.1)
LYMPHOCYTES ABSOLUTE: 0.9 K/UL (ref 1–5.1)
LYMPHOCYTES ABSOLUTE: 1 K/UL (ref 1–5.1)
LYMPHOCYTES ABSOLUTE: 1.1 K/UL (ref 1–5.1)
LYMPHOCYTES ABSOLUTE: 1.2 K/UL (ref 1–5.1)
LYMPHOCYTES ABSOLUTE: 1.2 K/UL (ref 1–5.1)
LYMPHOCYTES ABSOLUTE: 1.3 K/UL (ref 1–5.1)
LYMPHOCYTES ABSOLUTE: 2.2 K/UL (ref 1–5.1)
LYMPHOCYTES ABSOLUTE: 3.4 K/UL (ref 1–5.1)
LYMPHOCYTES RELATIVE PERCENT: 10.9 %
LYMPHOCYTES RELATIVE PERCENT: 15.5 %
LYMPHOCYTES RELATIVE PERCENT: 19 %
LYMPHOCYTES RELATIVE PERCENT: 2 %
LYMPHOCYTES RELATIVE PERCENT: 3.7 %
LYMPHOCYTES RELATIVE PERCENT: 6 %
LYMPHOCYTES RELATIVE PERCENT: 7.2 %
LYMPHOCYTES RELATIVE PERCENT: 8.5 %
LYMPHOCYTES RELATIVE PERCENT: 8.7 %
LYMPHOCYTES RELATIVE PERCENT: 9 %
LYMPHOCYTES RELATIVE PERCENT: 9.4 %
MAGNESIUM: 1.7 MG/DL (ref 1.8–2.4)
MAGNESIUM: 2.2 MG/DL (ref 1.8–2.4)
MCH RBC QN AUTO: 30.8 PG (ref 26–34)
MCH RBC QN AUTO: 30.9 PG (ref 26–34)
MCH RBC QN AUTO: 31.1 PG (ref 26–34)
MCH RBC QN AUTO: 31.2 PG (ref 26–34)
MCH RBC QN AUTO: 31.2 PG (ref 26–34)
MCH RBC QN AUTO: 31.5 PG (ref 26–34)
MCH RBC QN AUTO: 31.5 PG (ref 26–34)
MCH RBC QN AUTO: 31.7 PG (ref 26–34)
MCH RBC QN AUTO: 31.9 PG (ref 26–34)
MCH RBC QN AUTO: 32 PG (ref 26–34)
MCH RBC QN AUTO: 32 PG (ref 26–34)
MCH RBC QN AUTO: 32.5 PG (ref 26–34)
MCHC RBC AUTO-ENTMCNC: 31.7 G/DL (ref 31–36)
MCHC RBC AUTO-ENTMCNC: 32.8 G/DL (ref 31–36)
MCHC RBC AUTO-ENTMCNC: 32.9 G/DL (ref 31–36)
MCHC RBC AUTO-ENTMCNC: 33.2 G/DL (ref 31–36)
MCHC RBC AUTO-ENTMCNC: 33.2 G/DL (ref 31–36)
MCHC RBC AUTO-ENTMCNC: 33.4 G/DL (ref 31–36)
MCHC RBC AUTO-ENTMCNC: 33.5 G/DL (ref 31–36)
MCHC RBC AUTO-ENTMCNC: 33.6 G/DL (ref 31–36)
MCHC RBC AUTO-ENTMCNC: 33.7 G/DL (ref 31–36)
MCHC RBC AUTO-ENTMCNC: 33.7 G/DL (ref 31–36)
MCHC RBC AUTO-ENTMCNC: 33.8 G/DL (ref 31–36)
MCHC RBC AUTO-ENTMCNC: 34 G/DL (ref 31–36)
MCHC RBC AUTO-ENTMCNC: 34.2 G/DL (ref 31–36)
MCHC RBC AUTO-ENTMCNC: 34.6 G/DL (ref 31–36)
MCV RBC AUTO: 92.2 FL (ref 80–100)
MCV RBC AUTO: 92.3 FL (ref 80–100)
MCV RBC AUTO: 92.4 FL (ref 80–100)
MCV RBC AUTO: 92.7 FL (ref 80–100)
MCV RBC AUTO: 92.8 FL (ref 80–100)
MCV RBC AUTO: 93 FL (ref 80–100)
MCV RBC AUTO: 93.1 FL (ref 80–100)
MCV RBC AUTO: 93.5 FL (ref 80–100)
MCV RBC AUTO: 94 FL (ref 80–100)
MCV RBC AUTO: 94.2 FL (ref 80–100)
MCV RBC AUTO: 94.3 FL (ref 80–100)
MCV RBC AUTO: 94.7 FL (ref 80–100)
MCV RBC AUTO: 94.9 FL (ref 80–100)
MCV RBC AUTO: 94.9 FL (ref 80–100)
MCV RBC AUTO: 95.1 FL (ref 80–100)
MCV RBC AUTO: 95.7 FL (ref 80–100)
MCV RBC AUTO: 99.9 FL (ref 80–100)
METAMYELOCYTES RELATIVE PERCENT: 2 %
METHEMOGLOBIN ARTERIAL: 0.6 %
MICROSCOPIC EXAMINATION: NORMAL
MICROSCOPIC EXAMINATION: YES
MONOCYTE, FLUID: 18 %
MONOCYTES ABSOLUTE: 0.2 K/UL (ref 0–1.3)
MONOCYTES ABSOLUTE: 0.3 K/UL (ref 0–1.3)
MONOCYTES ABSOLUTE: 0.6 K/UL (ref 0–1.3)
MONOCYTES ABSOLUTE: 0.7 K/UL (ref 0–1.3)
MONOCYTES ABSOLUTE: 0.9 K/UL (ref 0–1.3)
MONOCYTES ABSOLUTE: 1 K/UL (ref 0–1.3)
MONOCYTES ABSOLUTE: 1.1 K/UL (ref 0–1.3)
MONOCYTES ABSOLUTE: 1.2 K/UL (ref 0–1.3)
MONOCYTES ABSOLUTE: 1.5 K/UL (ref 0–1.3)
MONOCYTES ABSOLUTE: 1.7 K/UL (ref 0–1.3)
MONOCYTES ABSOLUTE: 1.8 K/UL (ref 0–1.3)
MONOCYTES RELATIVE PERCENT: 10 %
MONOCYTES RELATIVE PERCENT: 10 %
MONOCYTES RELATIVE PERCENT: 11 %
MONOCYTES RELATIVE PERCENT: 3.1 %
MONOCYTES RELATIVE PERCENT: 3.9 %
MONOCYTES RELATIVE PERCENT: 4.6 %
MONOCYTES RELATIVE PERCENT: 5.8 %
MONOCYTES RELATIVE PERCENT: 6 %
MONOCYTES RELATIVE PERCENT: 7.5 %
MONOCYTES RELATIVE PERCENT: 8.4 %
MONOCYTES RELATIVE PERCENT: 8.5 %
MONOCYTES RELATIVE PERCENT: 8.6 %
MONOCYTES RELATIVE PERCENT: 9.7 %
NEUTROPHIL, FLUID: 82 %
NEUTROPHILS ABSOLUTE: 10 K/UL (ref 1.7–7.7)
NEUTROPHILS ABSOLUTE: 10.5 K/UL (ref 1.7–7.7)
NEUTROPHILS ABSOLUTE: 10.5 K/UL (ref 1.7–7.7)
NEUTROPHILS ABSOLUTE: 12.4 K/UL (ref 1.7–7.7)
NEUTROPHILS ABSOLUTE: 13 K/UL (ref 1.7–7.7)
NEUTROPHILS ABSOLUTE: 17.9 K/UL (ref 1.7–7.7)
NEUTROPHILS ABSOLUTE: 20.6 K/UL (ref 1.7–7.7)
NEUTROPHILS ABSOLUTE: 5.7 K/UL (ref 1.7–7.7)
NEUTROPHILS ABSOLUTE: 5.9 K/UL (ref 1.7–7.7)
NEUTROPHILS ABSOLUTE: 6.9 K/UL (ref 1.7–7.7)
NEUTROPHILS ABSOLUTE: 7.4 K/UL (ref 1.7–7.7)
NEUTROPHILS ABSOLUTE: 9.2 K/UL (ref 1.7–7.7)
NEUTROPHILS ABSOLUTE: 9.4 K/UL (ref 1.7–7.7)
NEUTROPHILS RELATIVE PERCENT: 66 %
NEUTROPHILS RELATIVE PERCENT: 74.8 %
NEUTROPHILS RELATIVE PERCENT: 77 %
NEUTROPHILS RELATIVE PERCENT: 79.5 %
NEUTROPHILS RELATIVE PERCENT: 79.7 %
NEUTROPHILS RELATIVE PERCENT: 80.1 %
NEUTROPHILS RELATIVE PERCENT: 80.7 %
NEUTROPHILS RELATIVE PERCENT: 82 %
NEUTROPHILS RELATIVE PERCENT: 85.1 %
NEUTROPHILS RELATIVE PERCENT: 87.1 %
NEUTROPHILS RELATIVE PERCENT: 87.2 %
NEUTROPHILS RELATIVE PERCENT: 88.8 %
NEUTROPHILS RELATIVE PERCENT: 91.4 %
NITRITE, URINE: NEGATIVE
NITRITE, URINE: NEGATIVE
NUCLEATED CELLS FLUID: NORMAL /CUMM
NUMBER OF CELLS COUNTED FLUID: 100
O2 CONTENT ARTERIAL: 15 ML/DL
O2 SAT, ARTERIAL: 100 % (ref 93–100)
O2 SAT, ARTERIAL: 95 %
O2 THERAPY: ABNORMAL
OCCULT BLOOD SCREENING: ABNORMAL
PCO2 ARTERIAL: 41.2 MMHG (ref 35–45)
PCO2 ARTERIAL: 51.3 MM HG (ref 35–45)
PDW BLD-RTO: 13.2 % (ref 12.4–15.4)
PDW BLD-RTO: 13.6 % (ref 12.4–15.4)
PDW BLD-RTO: 13.7 % (ref 12.4–15.4)
PDW BLD-RTO: 13.8 % (ref 12.4–15.4)
PDW BLD-RTO: 13.8 % (ref 12.4–15.4)
PDW BLD-RTO: 13.9 % (ref 12.4–15.4)
PDW BLD-RTO: 16.5 % (ref 12.4–15.4)
PDW BLD-RTO: 16.7 % (ref 12.4–15.4)
PDW BLD-RTO: 17 % (ref 12.4–15.4)
PDW BLD-RTO: 17 % (ref 12.4–15.4)
PERFORMED ON: ABNORMAL
PERFORMED ON: NORMAL
PH ARTERIAL: 7.34 (ref 7.35–7.45)
PH ARTERIAL: 7.45 (ref 7.35–7.45)
PH UA: 5 (ref 5–8)
PH UA: 6 (ref 5–8)
PHOSPHORUS: 2 MG/DL (ref 2.5–4.9)
PHOSPHORUS: 3.5 MG/DL (ref 2.5–4.9)
PHOSPHORUS: 3.8 MG/DL (ref 2.5–4.9)
PHOSPHORUS: 3.9 MG/DL (ref 2.5–4.9)
PLATELET # BLD: 155 K/UL (ref 135–450)
PLATELET # BLD: 160 K/UL (ref 135–450)
PLATELET # BLD: 164 K/UL (ref 135–450)
PLATELET # BLD: 165 K/UL (ref 135–450)
PLATELET # BLD: 167 K/UL (ref 135–450)
PLATELET # BLD: 173 K/UL (ref 135–450)
PLATELET # BLD: 183 K/UL (ref 135–450)
PLATELET # BLD: 186 K/UL (ref 135–450)
PLATELET # BLD: 189 K/UL (ref 135–450)
PLATELET # BLD: 191 K/UL (ref 135–450)
PLATELET # BLD: 202 K/UL (ref 135–450)
PLATELET # BLD: 218 K/UL (ref 135–450)
PLATELET # BLD: 241 K/UL (ref 135–450)
PLATELET # BLD: 256 K/UL (ref 135–450)
PLATELET # BLD: 268 K/UL (ref 135–450)
PLATELET # BLD: 271 K/UL (ref 135–450)
PLATELET # BLD: 296 K/UL (ref 135–450)
PLATELET SLIDE REVIEW: ADEQUATE
PMV BLD AUTO: 10 FL (ref 5–10.5)
PMV BLD AUTO: 7.4 FL (ref 5–10.5)
PMV BLD AUTO: 7.6 FL (ref 5–10.5)
PMV BLD AUTO: 7.6 FL (ref 5–10.5)
PMV BLD AUTO: 7.7 FL (ref 5–10.5)
PMV BLD AUTO: 7.7 FL (ref 5–10.5)
PMV BLD AUTO: 7.9 FL (ref 5–10.5)
PMV BLD AUTO: 8 FL (ref 5–10.5)
PMV BLD AUTO: 8.1 FL (ref 5–10.5)
PMV BLD AUTO: 8.1 FL (ref 5–10.5)
PMV BLD AUTO: 8.3 FL (ref 5–10.5)
PMV BLD AUTO: 8.5 FL (ref 5–10.5)
PMV BLD AUTO: 8.8 FL (ref 5–10.5)
PMV BLD AUTO: 8.9 FL (ref 5–10.5)
PMV BLD AUTO: 9 FL (ref 5–10.5)
PO2 ARTERIAL: 213.9 MM HG (ref 75–108)
PO2 ARTERIAL: 66.9 MMHG (ref 75–108)
POC HEMATOCRIT: 41 % (ref 40.5–52.5)
POC POTASSIUM: 4.6 MMOL/L (ref 3.5–5.1)
POC SAMPLE TYPE: ABNORMAL
POC SODIUM: 146 MMOL/L (ref 136–145)
POTASSIUM REFLEX MAGNESIUM: 3.8 MMOL/L (ref 3.5–5.1)
POTASSIUM REFLEX MAGNESIUM: 4 MMOL/L (ref 3.5–5.1)
POTASSIUM REFLEX MAGNESIUM: 4 MMOL/L (ref 3.5–5.1)
POTASSIUM REFLEX MAGNESIUM: 4.4 MMOL/L (ref 3.5–5.1)
POTASSIUM REFLEX MAGNESIUM: 4.5 MMOL/L (ref 3.5–5.1)
POTASSIUM REFLEX MAGNESIUM: 4.6 MMOL/L (ref 3.5–5.1)
POTASSIUM SERPL-SCNC: 3.7 MMOL/L (ref 3.5–5.1)
POTASSIUM SERPL-SCNC: 3.9 MMOL/L (ref 3.5–5.1)
POTASSIUM SERPL-SCNC: 3.9 MMOL/L (ref 3.5–5.1)
POTASSIUM SERPL-SCNC: 4 MMOL/L (ref 3.5–5.1)
POTASSIUM SERPL-SCNC: 4.2 MMOL/L (ref 3.5–5.1)
POTASSIUM SERPL-SCNC: 4.2 MMOL/L (ref 3.5–5.1)
POTASSIUM SERPL-SCNC: 4.3 MMOL/L (ref 3.5–5.1)
POTASSIUM SERPL-SCNC: 4.4 MMOL/L (ref 3.5–5.1)
POTASSIUM SERPL-SCNC: 4.7 MMOL/L (ref 3.5–5.1)
POTASSIUM SERPL-SCNC: 4.9 MMOL/L (ref 3.5–5.1)
POTASSIUM SERPL-SCNC: 5.2 MMOL/L (ref 3.5–5.1)
PROCALCITONIN: 0.2 NG/ML (ref 0–0.15)
PROCALCITONIN: 0.44 NG/ML (ref 0–0.15)
PROCALCITONIN: 2.01 NG/ML (ref 0–0.15)
PROCALCITONIN: 2.8 NG/ML (ref 0–0.15)
PROCALCITONIN: 4.72 NG/ML (ref 0–0.15)
PROCALCITONIN: 8.97 NG/ML (ref 0–0.15)
PROTEIN UA: 30 MG/DL
PROTEIN UA: NEGATIVE MG/DL
RBC # BLD: 2.89 M/UL (ref 4.2–5.9)
RBC # BLD: 3.22 M/UL (ref 4.2–5.9)
RBC # BLD: 3.26 M/UL (ref 4.2–5.9)
RBC # BLD: 3.32 M/UL (ref 4.2–5.9)
RBC # BLD: 3.34 M/UL (ref 4.2–5.9)
RBC # BLD: 3.36 M/UL (ref 4.2–5.9)
RBC # BLD: 3.39 M/UL (ref 4.2–5.9)
RBC # BLD: 3.56 M/UL (ref 4.2–5.9)
RBC # BLD: 3.59 M/UL (ref 4.2–5.9)
RBC # BLD: 3.61 M/UL (ref 4.2–5.9)
RBC # BLD: 3.64 M/UL (ref 4.2–5.9)
RBC # BLD: 3.67 M/UL (ref 4.2–5.9)
RBC # BLD: 3.79 M/UL (ref 4.2–5.9)
RBC # BLD: 3.8 M/UL (ref 4.2–5.9)
RBC # BLD: 3.92 M/UL (ref 4.2–5.9)
RBC # BLD: 3.94 M/UL (ref 4.2–5.9)
RBC # BLD: 3.98 M/UL (ref 4.2–5.9)
RBC FLUID: 427 /CUMM
RBC UA: 3 /HPF (ref 0–4)
SEDIMENTATION RATE, ERYTHROCYTE: 62 MM/HR (ref 0–20)
SLIDE REVIEW: ABNORMAL
SMUDGE CELLS: PRESENT
SODIUM BLD-SCNC: 125 MMOL/L (ref 136–145)
SODIUM BLD-SCNC: 134 MMOL/L (ref 136–145)
SODIUM BLD-SCNC: 134 MMOL/L (ref 136–145)
SODIUM BLD-SCNC: 135 MMOL/L (ref 136–145)
SODIUM BLD-SCNC: 136 MMOL/L (ref 136–145)
SODIUM BLD-SCNC: 138 MMOL/L (ref 136–145)
SODIUM BLD-SCNC: 139 MMOL/L (ref 136–145)
SODIUM BLD-SCNC: 140 MMOL/L (ref 136–145)
SODIUM BLD-SCNC: 141 MMOL/L (ref 136–145)
SODIUM BLD-SCNC: 141 MMOL/L (ref 136–145)
SODIUM BLD-SCNC: 142 MMOL/L (ref 136–145)
SODIUM BLD-SCNC: 144 MMOL/L (ref 136–145)
SOURCE BODY FLUID: NORMAL
SPECIFIC GRAVITY UA: 1.01 (ref 1–1.03)
SPECIFIC GRAVITY UA: >1.03 (ref 1–1.03)
TCO2 ARTERIAL: 29 MMOL/L
TCO2 ARTERIAL: 29.7 MMOL/L
TOTAL PROTEIN: 5.2 G/DL (ref 6.4–8.2)
TOTAL PROTEIN: 5.7 G/DL (ref 6.4–8.2)
TOTAL PROTEIN: 6.1 G/DL (ref 6.4–8.2)
TOTAL PROTEIN: 6.3 G/DL (ref 6.4–8.2)
TOTAL PROTEIN: 6.4 G/DL (ref 6.4–8.2)
TOTAL PROTEIN: 6.5 G/DL (ref 6.4–8.2)
TOTAL PROTEIN: 6.7 G/DL (ref 6.4–8.2)
TROPONIN: 0.01 NG/ML
TROPONIN: 0.02 NG/ML
TROPONIN: 0.02 NG/ML
TROPONIN: 0.12 NG/ML
TSH SERPL DL<=0.05 MIU/L-ACNC: 2.13 UIU/ML (ref 0.27–4.2)
URIC ACID, SERUM: 10 MG/DL (ref 3.5–7.2)
URINE CULTURE, ROUTINE: NORMAL
URINE REFLEX TO CULTURE: NORMAL
URINE REFLEX TO CULTURE: YES
URINE TYPE: ABNORMAL
URINE TYPE: NORMAL
UROBILINOGEN, URINE: 0.2 E.U./DL
UROBILINOGEN, URINE: 0.2 E.U./DL
VOLUME: 20 ML
WBC # BLD: 10.2 K/UL (ref 4–11)
WBC # BLD: 10.6 K/UL (ref 4–11)
WBC # BLD: 10.7 K/UL (ref 4–11)
WBC # BLD: 11.5 K/UL (ref 4–11)
WBC # BLD: 11.7 K/UL (ref 4–11)
WBC # BLD: 11.8 K/UL (ref 4–11)
WBC # BLD: 12.1 K/UL (ref 4–11)
WBC # BLD: 12.9 K/UL (ref 4–11)
WBC # BLD: 13.1 K/UL (ref 4–11)
WBC # BLD: 15.8 K/UL (ref 4–11)
WBC # BLD: 17.7 K/UL (ref 4–11)
WBC # BLD: 19.6 K/UL (ref 4–11)
WBC # BLD: 24.5 K/UL (ref 4–11)
WBC # BLD: 6.8 K/UL (ref 4–11)
WBC # BLD: 7.6 K/UL (ref 4–11)
WBC # BLD: 7.8 K/UL (ref 4–11)
WBC # BLD: 9.1 K/UL (ref 4–11)
WBC UA: 6 /HPF (ref 0–5)

## 2019-01-01 PROCEDURE — 36415 COLL VENOUS BLD VENIPUNCTURE: CPT

## 2019-01-01 PROCEDURE — 3017F COLORECTAL CA SCREEN DOC REV: CPT | Performed by: INTERNAL MEDICINE

## 2019-01-01 PROCEDURE — 2700000000 HC OXYGEN THERAPY PER DAY

## 2019-01-01 PROCEDURE — 71045 X-RAY EXAM CHEST 1 VIEW: CPT

## 2019-01-01 PROCEDURE — 2580000003 HC RX 258: Performed by: INTERNAL MEDICINE

## 2019-01-01 PROCEDURE — 6370000000 HC RX 637 (ALT 250 FOR IP): Performed by: HOSPITALIST

## 2019-01-01 PROCEDURE — 1123F ACP DISCUSS/DSCN MKR DOCD: CPT | Performed by: FAMILY MEDICINE

## 2019-01-01 PROCEDURE — 84145 PROCALCITONIN (PCT): CPT

## 2019-01-01 PROCEDURE — 1280000000 HC REHAB R&B

## 2019-01-01 PROCEDURE — 80069 RENAL FUNCTION PANEL: CPT

## 2019-01-01 PROCEDURE — 4040F PNEUMOC VAC/ADMIN/RCVD: CPT | Performed by: FAMILY MEDICINE

## 2019-01-01 PROCEDURE — 6370000000 HC RX 637 (ALT 250 FOR IP): Performed by: INTERNAL MEDICINE

## 2019-01-01 PROCEDURE — 85027 COMPLETE CBC AUTOMATED: CPT

## 2019-01-01 PROCEDURE — 20610 DRAIN/INJ JOINT/BURSA W/O US: CPT | Performed by: ORTHOPAEDIC SURGERY

## 2019-01-01 PROCEDURE — 94760 N-INVAS EAR/PLS OXIMETRY 1: CPT

## 2019-01-01 PROCEDURE — 0BH17EZ INSERTION OF ENDOTRACHEAL AIRWAY INTO TRACHEA, VIA NATURAL OR ARTIFICIAL OPENING: ICD-10-PCS | Performed by: INTERNAL MEDICINE

## 2019-01-01 PROCEDURE — 84484 ASSAY OF TROPONIN QUANT: CPT

## 2019-01-01 PROCEDURE — 99213 OFFICE O/P EST LOW 20 MIN: CPT | Performed by: ORTHOPAEDIC SURGERY

## 2019-01-01 PROCEDURE — 6370000000 HC RX 637 (ALT 250 FOR IP): Performed by: PHYSICAL MEDICINE & REHABILITATION

## 2019-01-01 PROCEDURE — 1036F TOBACCO NON-USER: CPT | Performed by: ORTHOPAEDIC SURGERY

## 2019-01-01 PROCEDURE — 85025 COMPLETE CBC W/AUTO DIFF WBC: CPT

## 2019-01-01 PROCEDURE — 2580000003 HC RX 258: Performed by: EMERGENCY MEDICINE

## 2019-01-01 PROCEDURE — G8427 DOCREV CUR MEDS BY ELIG CLIN: HCPCS | Performed by: ORTHOPAEDIC SURGERY

## 2019-01-01 PROCEDURE — 2500000003 HC RX 250 WO HCPCS: Performed by: INTERNAL MEDICINE

## 2019-01-01 PROCEDURE — C9113 INJ PANTOPRAZOLE SODIUM, VIA: HCPCS | Performed by: INTERNAL MEDICINE

## 2019-01-01 PROCEDURE — 6360000002 HC RX W HCPCS: Performed by: INTERNAL MEDICINE

## 2019-01-01 PROCEDURE — 81001 URINALYSIS AUTO W/SCOPE: CPT

## 2019-01-01 PROCEDURE — 83605 ASSAY OF LACTIC ACID: CPT

## 2019-01-01 PROCEDURE — 6360000002 HC RX W HCPCS: Performed by: EMERGENCY MEDICINE

## 2019-01-01 PROCEDURE — 2000000000 HC ICU R&B

## 2019-01-01 PROCEDURE — 93005 ELECTROCARDIOGRAM TRACING: CPT | Performed by: INTERNAL MEDICINE

## 2019-01-01 PROCEDURE — 3017F COLORECTAL CA SCREEN DOC REV: CPT | Performed by: ORTHOPAEDIC SURGERY

## 2019-01-01 PROCEDURE — 97530 THERAPEUTIC ACTIVITIES: CPT | Performed by: PHYSICAL THERAPIST

## 2019-01-01 PROCEDURE — 2060000000 HC ICU INTERMEDIATE R&B

## 2019-01-01 PROCEDURE — 94761 N-INVAS EAR/PLS OXIMETRY MLT: CPT

## 2019-01-01 PROCEDURE — 6360000002 HC RX W HCPCS: Performed by: HOSPITALIST

## 2019-01-01 PROCEDURE — 84132 ASSAY OF SERUM POTASSIUM: CPT

## 2019-01-01 PROCEDURE — 82803 BLOOD GASES ANY COMBINATION: CPT

## 2019-01-01 PROCEDURE — 93306 TTE W/DOPPLER COMPLETE: CPT

## 2019-01-01 PROCEDURE — 97110 THERAPEUTIC EXERCISES: CPT

## 2019-01-01 PROCEDURE — G8598 ASA/ANTIPLAT THER USED: HCPCS | Performed by: FAMILY MEDICINE

## 2019-01-01 PROCEDURE — 99213 OFFICE O/P EST LOW 20 MIN: CPT | Performed by: FAMILY MEDICINE

## 2019-01-01 PROCEDURE — 6360000002 HC RX W HCPCS: Performed by: PHYSICAL MEDICINE & REHABILITATION

## 2019-01-01 PROCEDURE — 80048 BASIC METABOLIC PNL TOTAL CA: CPT

## 2019-01-01 PROCEDURE — 80053 COMPREHEN METABOLIC PANEL: CPT

## 2019-01-01 PROCEDURE — 94002 VENT MGMT INPAT INIT DAY: CPT

## 2019-01-01 PROCEDURE — 97530 THERAPEUTIC ACTIVITIES: CPT

## 2019-01-01 PROCEDURE — 92526 ORAL FUNCTION THERAPY: CPT

## 2019-01-01 PROCEDURE — G8420 CALC BMI NORM PARAMETERS: HCPCS | Performed by: FAMILY MEDICINE

## 2019-01-01 PROCEDURE — 89060 EXAM SYNOVIAL FLUID CRYSTALS: CPT

## 2019-01-01 PROCEDURE — 85018 HEMOGLOBIN: CPT

## 2019-01-01 PROCEDURE — 97116 GAIT TRAINING THERAPY: CPT | Performed by: PHYSICAL THERAPIST

## 2019-01-01 PROCEDURE — 97535 SELF CARE MNGMENT TRAINING: CPT

## 2019-01-01 PROCEDURE — 4040F PNEUMOC VAC/ADMIN/RCVD: CPT | Performed by: ORTHOPAEDIC SURGERY

## 2019-01-01 PROCEDURE — 85014 HEMATOCRIT: CPT

## 2019-01-01 PROCEDURE — G8484 FLU IMMUNIZE NO ADMIN: HCPCS | Performed by: ORTHOPAEDIC SURGERY

## 2019-01-01 PROCEDURE — 20610 DRAIN/INJ JOINT/BURSA W/O US: CPT | Performed by: NURSE PRACTITIONER

## 2019-01-01 PROCEDURE — G8417 CALC BMI ABV UP PARAM F/U: HCPCS | Performed by: ORTHOPAEDIC SURGERY

## 2019-01-01 PROCEDURE — 1123F ACP DISCUSS/DSCN MKR DOCD: CPT | Performed by: INTERNAL MEDICINE

## 2019-01-01 PROCEDURE — 1123F ACP DISCUSS/DSCN MKR DOCD: CPT | Performed by: ORTHOPAEDIC SURGERY

## 2019-01-01 PROCEDURE — 93000 ELECTROCARDIOGRAM COMPLETE: CPT | Performed by: INTERNAL MEDICINE

## 2019-01-01 PROCEDURE — 73560 X-RAY EXAM OF KNEE 1 OR 2: CPT

## 2019-01-01 PROCEDURE — 97110 THERAPEUTIC EXERCISES: CPT | Performed by: PHYSICAL THERAPIST

## 2019-01-01 PROCEDURE — 86140 C-REACTIVE PROTEIN: CPT

## 2019-01-01 PROCEDURE — 96367 TX/PROPH/DG ADDL SEQ IV INF: CPT

## 2019-01-01 PROCEDURE — 97116 GAIT TRAINING THERAPY: CPT

## 2019-01-01 PROCEDURE — 99214 OFFICE O/P EST MOD 30 MIN: CPT | Performed by: ORTHOPAEDIC SURGERY

## 2019-01-01 PROCEDURE — 89051 BODY FLUID CELL COUNT: CPT

## 2019-01-01 PROCEDURE — 6370000000 HC RX 637 (ALT 250 FOR IP): Performed by: NURSE PRACTITIONER

## 2019-01-01 PROCEDURE — G8427 DOCREV CUR MEDS BY ELIG CLIN: HCPCS | Performed by: FAMILY MEDICINE

## 2019-01-01 PROCEDURE — 2022F DILAT RTA XM EVC RTNOPTHY: CPT | Performed by: FAMILY MEDICINE

## 2019-01-01 PROCEDURE — 71260 CT THORAX DX C+: CPT

## 2019-01-01 PROCEDURE — 3017F COLORECTAL CA SCREEN DOC REV: CPT | Performed by: FAMILY MEDICINE

## 2019-01-01 PROCEDURE — 93005 ELECTROCARDIOGRAM TRACING: CPT | Performed by: EMERGENCY MEDICINE

## 2019-01-01 PROCEDURE — 99285 EMERGENCY DEPT VISIT HI MDM: CPT

## 2019-01-01 PROCEDURE — 96365 THER/PROPH/DIAG IV INF INIT: CPT

## 2019-01-01 PROCEDURE — 1036F TOBACCO NON-USER: CPT | Performed by: FAMILY MEDICINE

## 2019-01-01 PROCEDURE — 6360000002 HC RX W HCPCS: Performed by: NURSE PRACTITIONER

## 2019-01-01 PROCEDURE — 84295 ASSAY OF SERUM SODIUM: CPT

## 2019-01-01 PROCEDURE — 99203 OFFICE O/P NEW LOW 30 MIN: CPT | Performed by: ORTHOPAEDIC SURGERY

## 2019-01-01 PROCEDURE — 87070 CULTURE OTHR SPECIMN AEROBIC: CPT

## 2019-01-01 PROCEDURE — 87086 URINE CULTURE/COLONY COUNT: CPT

## 2019-01-01 PROCEDURE — 83735 ASSAY OF MAGNESIUM: CPT

## 2019-01-01 PROCEDURE — 81003 URINALYSIS AUTO W/O SCOPE: CPT

## 2019-01-01 PROCEDURE — G8427 DOCREV CUR MEDS BY ELIG CLIN: HCPCS | Performed by: INTERNAL MEDICINE

## 2019-01-01 PROCEDURE — 73610 X-RAY EXAM OF ANKLE: CPT

## 2019-01-01 PROCEDURE — 93010 ELECTROCARDIOGRAM REPORT: CPT | Performed by: INTERNAL MEDICINE

## 2019-01-01 PROCEDURE — 83036 HEMOGLOBIN GLYCOSYLATED A1C: CPT

## 2019-01-01 PROCEDURE — G8417 CALC BMI ABV UP PARAM F/U: HCPCS | Performed by: INTERNAL MEDICINE

## 2019-01-01 PROCEDURE — 74018 RADEX ABDOMEN 1 VIEW: CPT

## 2019-01-01 PROCEDURE — 97162 PT EVAL MOD COMPLEX 30 MIN: CPT

## 2019-01-01 PROCEDURE — G8484 FLU IMMUNIZE NO ADMIN: HCPCS | Performed by: FAMILY MEDICINE

## 2019-01-01 PROCEDURE — 2500000003 HC RX 250 WO HCPCS: Performed by: NURSE PRACTITIONER

## 2019-01-01 PROCEDURE — 1111F DSCHRG MED/CURRENT MED MERGE: CPT | Performed by: ORTHOPAEDIC SURGERY

## 2019-01-01 PROCEDURE — 02HV33Z INSERTION OF INFUSION DEVICE INTO SUPERIOR VENA CAVA, PERCUTANEOUS APPROACH: ICD-10-PCS | Performed by: INTERNAL MEDICINE

## 2019-01-01 PROCEDURE — 87040 BLOOD CULTURE FOR BACTERIA: CPT

## 2019-01-01 PROCEDURE — 6370000000 HC RX 637 (ALT 250 FOR IP): Performed by: STUDENT IN AN ORGANIZED HEALTH CARE EDUCATION/TRAINING PROGRAM

## 2019-01-01 PROCEDURE — 82533 TOTAL CORTISOL: CPT

## 2019-01-01 PROCEDURE — 85652 RBC SED RATE AUTOMATED: CPT

## 2019-01-01 PROCEDURE — G0328 FECAL BLOOD SCRN IMMUNOASSAY: HCPCS

## 2019-01-01 PROCEDURE — 84443 ASSAY THYROID STIM HORMONE: CPT

## 2019-01-01 PROCEDURE — 36600 WITHDRAWAL OF ARTERIAL BLOOD: CPT

## 2019-01-01 PROCEDURE — 96361 HYDRATE IV INFUSION ADD-ON: CPT

## 2019-01-01 PROCEDURE — 93005 ELECTROCARDIOGRAM TRACING: CPT | Performed by: PHYSICAL MEDICINE & REHABILITATION

## 2019-01-01 PROCEDURE — G8598 ASA/ANTIPLAT THER USED: HCPCS | Performed by: ORTHOPAEDIC SURGERY

## 2019-01-01 PROCEDURE — 1036F TOBACCO NON-USER: CPT | Performed by: INTERNAL MEDICINE

## 2019-01-01 PROCEDURE — 2580000003 HC RX 258: Performed by: HOSPITALIST

## 2019-01-01 PROCEDURE — G8599 NO ASA/ANTIPLAT THER USE RNG: HCPCS | Performed by: INTERNAL MEDICINE

## 2019-01-01 PROCEDURE — G8417 CALC BMI ABV UP PARAM F/U: HCPCS | Performed by: FAMILY MEDICINE

## 2019-01-01 PROCEDURE — 2580000003 HC RX 258: Performed by: PHYSICAL MEDICINE & REHABILITATION

## 2019-01-01 PROCEDURE — 92950 HEART/LUNG RESUSCITATION CPR: CPT

## 2019-01-01 PROCEDURE — 97166 OT EVAL MOD COMPLEX 45 MIN: CPT

## 2019-01-01 PROCEDURE — 6370000000 HC RX 637 (ALT 250 FOR IP): Performed by: EMERGENCY MEDICINE

## 2019-01-01 PROCEDURE — 99214 OFFICE O/P EST MOD 30 MIN: CPT | Performed by: FAMILY MEDICINE

## 2019-01-01 PROCEDURE — 96366 THER/PROPH/DIAG IV INF ADDON: CPT

## 2019-01-01 PROCEDURE — 97162 PT EVAL MOD COMPLEX 30 MIN: CPT | Performed by: PHYSICAL THERAPIST

## 2019-01-01 PROCEDURE — 6360000004 HC RX CONTRAST MEDICATION: Performed by: EMERGENCY MEDICINE

## 2019-01-01 PROCEDURE — 5A1935Z RESPIRATORY VENTILATION, LESS THAN 24 CONSECUTIVE HOURS: ICD-10-PCS | Performed by: INTERNAL MEDICINE

## 2019-01-01 PROCEDURE — 87205 SMEAR GRAM STAIN: CPT

## 2019-01-01 PROCEDURE — 84550 ASSAY OF BLOOD/URIC ACID: CPT

## 2019-01-01 PROCEDURE — G8482 FLU IMMUNIZE ORDER/ADMIN: HCPCS | Performed by: ORTHOPAEDIC SURGERY

## 2019-01-01 PROCEDURE — 82947 ASSAY GLUCOSE BLOOD QUANT: CPT

## 2019-01-01 PROCEDURE — 97112 NEUROMUSCULAR REEDUCATION: CPT

## 2019-01-01 PROCEDURE — 92610 EVALUATE SWALLOWING FUNCTION: CPT

## 2019-01-01 PROCEDURE — 94640 AIRWAY INHALATION TREATMENT: CPT

## 2019-01-01 PROCEDURE — 6360000004 HC RX CONTRAST MEDICATION: Performed by: INTERNAL MEDICINE

## 2019-01-01 PROCEDURE — 72131 CT LUMBAR SPINE W/O DYE: CPT

## 2019-01-01 PROCEDURE — 99214 OFFICE O/P EST MOD 30 MIN: CPT | Performed by: INTERNAL MEDICINE

## 2019-01-01 PROCEDURE — 1101F PT FALLS ASSESS-DOCD LE1/YR: CPT | Performed by: ORTHOPAEDIC SURGERY

## 2019-01-01 PROCEDURE — 1111F DSCHRG MED/CURRENT MED MERGE: CPT

## 2019-01-01 PROCEDURE — 93971 EXTREMITY STUDY: CPT

## 2019-01-01 PROCEDURE — 3044F HG A1C LEVEL LT 7.0%: CPT | Performed by: FAMILY MEDICINE

## 2019-01-01 PROCEDURE — 4040F PNEUMOC VAC/ADMIN/RCVD: CPT | Performed by: INTERNAL MEDICINE

## 2019-01-01 PROCEDURE — 6360000002 HC RX W HCPCS: Performed by: STUDENT IN AN ORGANIZED HEALTH CARE EDUCATION/TRAINING PROGRAM

## 2019-01-01 PROCEDURE — 2500000003 HC RX 250 WO HCPCS

## 2019-01-01 PROCEDURE — 82330 ASSAY OF CALCIUM: CPT

## 2019-01-01 PROCEDURE — 83690 ASSAY OF LIPASE: CPT

## 2019-01-01 PROCEDURE — 99221 1ST HOSP IP/OBS SF/LOW 40: CPT | Performed by: NURSE PRACTITIONER

## 2019-01-01 PROCEDURE — 70450 CT HEAD/BRAIN W/O DYE: CPT

## 2019-01-01 RX ORDER — LISINOPRIL 20 MG/1
20 TABLET ORAL DAILY
Status: DISCONTINUED | OUTPATIENT
Start: 2019-01-01 | End: 2019-01-01 | Stop reason: HOSPADM

## 2019-01-01 RX ORDER — NICOTINE POLACRILEX 4 MG
15 LOZENGE BUCCAL PRN
Status: DISCONTINUED | OUTPATIENT
Start: 2019-01-01 | End: 2019-01-01 | Stop reason: HOSPADM

## 2019-01-01 RX ORDER — BISACODYL 10 MG
10 SUPPOSITORY, RECTAL RECTAL DAILY PRN
Status: DISCONTINUED | OUTPATIENT
Start: 2019-01-01 | End: 2019-01-01 | Stop reason: HOSPADM

## 2019-01-01 RX ORDER — LORAZEPAM 1 MG/1
2 TABLET ORAL
Status: DISCONTINUED | OUTPATIENT
Start: 2019-01-01 | End: 2019-11-10 | Stop reason: HOSPADM

## 2019-01-01 RX ORDER — ETOMIDATE 2 MG/ML
INJECTION INTRAVENOUS
Status: COMPLETED | OUTPATIENT
Start: 2019-01-01 | End: 2019-01-01

## 2019-01-01 RX ORDER — CLONIDINE HYDROCHLORIDE 0.1 MG/1
0.2 TABLET ORAL 3 TIMES DAILY
Status: CANCELLED | OUTPATIENT
Start: 2019-01-01

## 2019-01-01 RX ORDER — LABETALOL HYDROCHLORIDE 5 MG/ML
10 INJECTION, SOLUTION INTRAVENOUS EVERY 4 HOURS PRN
Status: DISCONTINUED | OUTPATIENT
Start: 2019-01-01 | End: 2019-01-01

## 2019-01-01 RX ORDER — 0.9 % SODIUM CHLORIDE 0.9 %
1700 INTRAVENOUS SOLUTION INTRAVENOUS ONCE
Status: COMPLETED | OUTPATIENT
Start: 2019-01-01 | End: 2019-01-01

## 2019-01-01 RX ORDER — NICOTINE POLACRILEX 4 MG
15 LOZENGE BUCCAL PRN
Status: DISCONTINUED | OUTPATIENT
Start: 2019-01-01 | End: 2019-11-10 | Stop reason: HOSPADM

## 2019-01-01 RX ORDER — LISINOPRIL 20 MG/1
20 TABLET ORAL DAILY
Qty: 30 TABLET | Refills: 3 | Status: SHIPPED | OUTPATIENT
Start: 2019-01-01 | End: 2019-01-01 | Stop reason: SDUPTHER

## 2019-01-01 RX ORDER — LORAZEPAM 2 MG/ML
0.5 INJECTION INTRAMUSCULAR ONCE
Status: COMPLETED | OUTPATIENT
Start: 2019-01-01 | End: 2019-01-01

## 2019-01-01 RX ORDER — CLONIDINE HYDROCHLORIDE 0.1 MG/1
0.2 TABLET ORAL 3 TIMES DAILY
Status: DISCONTINUED | OUTPATIENT
Start: 2019-01-01 | End: 2019-01-01 | Stop reason: HOSPADM

## 2019-01-01 RX ORDER — PROCHLORPERAZINE EDISYLATE 5 MG/ML
10 INJECTION INTRAMUSCULAR; INTRAVENOUS EVERY 6 HOURS PRN
Status: DISCONTINUED | OUTPATIENT
Start: 2019-01-01 | End: 2019-01-01

## 2019-01-01 RX ORDER — PREDNISONE 20 MG/1
40 TABLET ORAL DAILY
Qty: 2 TABLET | Refills: 0 | Status: ON HOLD | OUTPATIENT
Start: 2019-01-01 | End: 2019-01-01 | Stop reason: HOSPADM

## 2019-01-01 RX ORDER — LABETALOL HYDROCHLORIDE 5 MG/ML
10 INJECTION, SOLUTION INTRAVENOUS EVERY 4 HOURS PRN
Status: CANCELLED | OUTPATIENT
Start: 2019-01-01

## 2019-01-01 RX ORDER — METHYLPREDNISOLONE ACETATE 40 MG/ML
80 INJECTION, SUSPENSION INTRA-ARTICULAR; INTRALESIONAL; INTRAMUSCULAR; SOFT TISSUE ONCE
Status: COMPLETED | OUTPATIENT
Start: 2019-01-01 | End: 2019-01-01

## 2019-01-01 RX ORDER — BUPIVACAINE HYDROCHLORIDE 5 MG/ML
4 INJECTION, SOLUTION EPIDURAL; INTRACAUDAL ONCE
Status: COMPLETED | OUTPATIENT
Start: 2019-01-01 | End: 2019-01-01

## 2019-01-01 RX ORDER — DEXTROSE MONOHYDRATE 25 G/50ML
12.5 INJECTION, SOLUTION INTRAVENOUS PRN
Status: DISCONTINUED | OUTPATIENT
Start: 2019-01-01 | End: 2019-11-10 | Stop reason: HOSPADM

## 2019-01-01 RX ORDER — FUROSEMIDE 10 MG/ML
40 INJECTION INTRAMUSCULAR; INTRAVENOUS ONCE
Status: COMPLETED | OUTPATIENT
Start: 2019-01-01 | End: 2019-01-01

## 2019-01-01 RX ORDER — LISINOPRIL 10 MG/1
10 TABLET ORAL DAILY
Status: DISCONTINUED | OUTPATIENT
Start: 2019-01-01 | End: 2019-01-01

## 2019-01-01 RX ORDER — CLONIDINE HYDROCHLORIDE 0.1 MG/1
0.1 TABLET ORAL 2 TIMES DAILY
Status: DISCONTINUED | OUTPATIENT
Start: 2019-01-01 | End: 2019-01-01

## 2019-01-01 RX ORDER — PROCHLORPERAZINE MALEATE 5 MG/1
10 TABLET ORAL EVERY 6 HOURS PRN
Status: DISCONTINUED | OUTPATIENT
Start: 2019-01-01 | End: 2019-01-01 | Stop reason: HOSPADM

## 2019-01-01 RX ORDER — LISINOPRIL 5 MG/1
5 TABLET ORAL ONCE
Status: COMPLETED | OUTPATIENT
Start: 2019-01-01 | End: 2019-01-01

## 2019-01-01 RX ORDER — FOLIC ACID 1 MG/1
1 TABLET ORAL DAILY
Qty: 30 TABLET | Refills: 3 | Status: SHIPPED | OUTPATIENT
Start: 2019-01-01

## 2019-01-01 RX ORDER — ONDANSETRON 4 MG/1
4 TABLET, FILM COATED ORAL EVERY 8 HOURS PRN
Status: CANCELLED | OUTPATIENT
Start: 2019-01-01

## 2019-01-01 RX ORDER — AMLODIPINE BESYLATE 5 MG/1
5 TABLET ORAL DAILY
Qty: 30 TABLET | Refills: 3 | Status: SHIPPED | OUTPATIENT
Start: 2019-01-01 | End: 2019-01-01 | Stop reason: SDUPTHER

## 2019-01-01 RX ORDER — DABIGATRAN ETEXILATE 150 MG/1
150 CAPSULE, COATED PELLETS ORAL 2 TIMES DAILY
Status: DISCONTINUED | OUTPATIENT
Start: 2019-01-01 | End: 2019-01-01 | Stop reason: HOSPADM

## 2019-01-01 RX ORDER — FUROSEMIDE 10 MG/ML
20 INJECTION INTRAMUSCULAR; INTRAVENOUS ONCE
Status: COMPLETED | OUTPATIENT
Start: 2019-01-01 | End: 2019-01-01

## 2019-01-01 RX ORDER — DEXTROSE MONOHYDRATE 50 MG/ML
100 INJECTION, SOLUTION INTRAVENOUS PRN
Status: DISCONTINUED | OUTPATIENT
Start: 2019-01-01 | End: 2019-11-10 | Stop reason: HOSPADM

## 2019-01-01 RX ORDER — SIMVASTATIN 20 MG
20 TABLET ORAL NIGHTLY
Qty: 90 TABLET | Refills: 1 | Status: SHIPPED | OUTPATIENT
Start: 2019-01-01

## 2019-01-01 RX ORDER — AMLODIPINE BESYLATE 5 MG/1
5 TABLET ORAL DAILY
Status: DISCONTINUED | OUTPATIENT
Start: 2019-01-01 | End: 2019-01-01 | Stop reason: HOSPADM

## 2019-01-01 RX ORDER — 0.9 % SODIUM CHLORIDE 0.9 %
30 INTRAVENOUS SOLUTION INTRAVENOUS ONCE
Status: COMPLETED | OUTPATIENT
Start: 2019-01-01 | End: 2019-01-01

## 2019-01-01 RX ORDER — IPRATROPIUM BROMIDE AND ALBUTEROL SULFATE 2.5; .5 MG/3ML; MG/3ML
1 SOLUTION RESPIRATORY (INHALATION) EVERY 4 HOURS PRN
Status: DISCONTINUED | OUTPATIENT
Start: 2019-01-01 | End: 2019-01-01 | Stop reason: HOSPADM

## 2019-01-01 RX ORDER — HYDRALAZINE HYDROCHLORIDE 25 MG/1
25 TABLET, FILM COATED ORAL EVERY 6 HOURS PRN
Status: DISCONTINUED | OUTPATIENT
Start: 2019-01-01 | End: 2019-01-01 | Stop reason: HOSPADM

## 2019-01-01 RX ORDER — LORAZEPAM 1 MG/1
3 TABLET ORAL
Status: DISCONTINUED | OUTPATIENT
Start: 2019-01-01 | End: 2019-01-01

## 2019-01-01 RX ORDER — DIPHENHYDRAMINE HCL 25 MG
50 TABLET ORAL NIGHTLY PRN
Status: DISCONTINUED | OUTPATIENT
Start: 2019-01-01 | End: 2019-01-01 | Stop reason: HOSPADM

## 2019-01-01 RX ORDER — DABIGATRAN ETEXILATE 150 MG/1
150 CAPSULE, COATED PELLETS ORAL 2 TIMES DAILY
Qty: 60 CAPSULE | Refills: 6 | Status: SHIPPED | OUTPATIENT
Start: 2019-01-01

## 2019-01-01 RX ORDER — AMLODIPINE BESYLATE 5 MG/1
5 TABLET ORAL DAILY
Qty: 30 TABLET | Refills: 3 | Status: SHIPPED | OUTPATIENT
Start: 2019-01-01

## 2019-01-01 RX ORDER — PANTOPRAZOLE SODIUM 40 MG/1
40 TABLET, DELAYED RELEASE ORAL
Status: DISCONTINUED | OUTPATIENT
Start: 2019-01-01 | End: 2019-01-01 | Stop reason: HOSPADM

## 2019-01-01 RX ORDER — ACETAMINOPHEN 325 MG/1
650 TABLET ORAL EVERY 4 HOURS PRN
Status: DISCONTINUED | OUTPATIENT
Start: 2019-01-01 | End: 2019-11-10 | Stop reason: HOSPADM

## 2019-01-01 RX ORDER — PREDNISONE 20 MG/1
20 TABLET ORAL DAILY
Status: DISCONTINUED | OUTPATIENT
Start: 2019-01-01 | End: 2019-01-01

## 2019-01-01 RX ORDER — ACETAMINOPHEN 325 MG/1
650 TABLET ORAL EVERY 4 HOURS PRN
Status: CANCELLED | OUTPATIENT
Start: 2019-01-01

## 2019-01-01 RX ORDER — IPRATROPIUM BROMIDE AND ALBUTEROL SULFATE 2.5; .5 MG/3ML; MG/3ML
1 SOLUTION RESPIRATORY (INHALATION) EVERY 4 HOURS PRN
Status: CANCELLED | OUTPATIENT
Start: 2019-01-01

## 2019-01-01 RX ORDER — DABIGATRAN ETEXILATE 150 MG/1
150 CAPSULE, COATED PELLETS ORAL 2 TIMES DAILY
Status: CANCELLED | OUTPATIENT
Start: 2019-01-01

## 2019-01-01 RX ORDER — PANTOPRAZOLE SODIUM 40 MG/10ML
40 INJECTION, POWDER, LYOPHILIZED, FOR SOLUTION INTRAVENOUS DAILY
Status: DISCONTINUED | OUTPATIENT
Start: 2019-01-01 | End: 2019-01-01

## 2019-01-01 RX ORDER — LORAZEPAM 2 MG/ML
2 INJECTION INTRAMUSCULAR
Status: DISCONTINUED | OUTPATIENT
Start: 2019-01-01 | End: 2019-01-01

## 2019-01-01 RX ORDER — DABIGATRAN ETEXILATE 150 MG/1
150 CAPSULE, COATED PELLETS ORAL 2 TIMES DAILY
Qty: 60 CAPSULE | Refills: 6 | Status: SHIPPED | OUTPATIENT
Start: 2019-01-01 | End: 2019-01-01 | Stop reason: SDUPTHER

## 2019-01-01 RX ORDER — ACETAMINOPHEN 325 MG/1
650 TABLET ORAL EVERY 8 HOURS PRN
Status: DISCONTINUED | OUTPATIENT
Start: 2019-01-01 | End: 2019-01-01 | Stop reason: SDUPTHER

## 2019-01-01 RX ORDER — CARVEDILOL 12.5 MG/1
37.5 TABLET ORAL 2 TIMES DAILY
Qty: 60 TABLET | Refills: 3 | Status: SHIPPED | OUTPATIENT
Start: 2019-01-01 | End: 2019-01-01 | Stop reason: SDUPTHER

## 2019-01-01 RX ORDER — SODIUM CHLORIDE 0.9 % (FLUSH) 0.9 %
10 SYRINGE (ML) INJECTION EVERY 12 HOURS SCHEDULED
Status: DISCONTINUED | OUTPATIENT
Start: 2019-01-01 | End: 2019-01-01 | Stop reason: SDUPTHER

## 2019-01-01 RX ORDER — LORAZEPAM 2 MG/ML
3 INJECTION INTRAMUSCULAR
Status: DISCONTINUED | OUTPATIENT
Start: 2019-01-01 | End: 2019-01-01

## 2019-01-01 RX ORDER — CARVEDILOL 25 MG/1
25 TABLET ORAL 2 TIMES DAILY WITH MEALS
Status: DISCONTINUED | OUTPATIENT
Start: 2019-01-01 | End: 2019-11-10 | Stop reason: HOSPADM

## 2019-01-01 RX ORDER — LISINOPRIL 20 MG/1
20 TABLET ORAL DAILY
Qty: 30 TABLET | Refills: 3 | Status: SHIPPED | OUTPATIENT
Start: 2019-01-01

## 2019-01-01 RX ORDER — POLYETHYLENE GLYCOL 3350 17 G/17G
17 POWDER, FOR SOLUTION ORAL DAILY
Status: DISCONTINUED | OUTPATIENT
Start: 2019-01-01 | End: 2019-01-01 | Stop reason: HOSPADM

## 2019-01-01 RX ORDER — PREDNISONE 20 MG/1
40 TABLET ORAL DAILY
Status: CANCELLED | OUTPATIENT
Start: 2019-01-01

## 2019-01-01 RX ORDER — CLONIDINE HYDROCHLORIDE 0.2 MG/1
0.2 TABLET ORAL 3 TIMES DAILY
Qty: 60 TABLET | Refills: 3 | Status: ON HOLD | OUTPATIENT
Start: 2019-01-01 | End: 2019-01-01 | Stop reason: HOSPADM

## 2019-01-01 RX ORDER — LANOLIN ALCOHOL/MO/W.PET/CERES
3 CREAM (GRAM) TOPICAL NIGHTLY PRN
Status: DISCONTINUED | OUTPATIENT
Start: 2019-01-01 | End: 2019-11-10 | Stop reason: HOSPADM

## 2019-01-01 RX ORDER — LORAZEPAM 1 MG/1
1 TABLET ORAL
Status: DISCONTINUED | OUTPATIENT
Start: 2019-01-01 | End: 2019-11-10 | Stop reason: HOSPADM

## 2019-01-01 RX ORDER — FUROSEMIDE 40 MG/1
40 TABLET ORAL DAILY
Status: CANCELLED | OUTPATIENT
Start: 2019-01-01

## 2019-01-01 RX ORDER — THIAMINE MONONITRATE (VIT B1) 100 MG
100 TABLET ORAL DAILY
Status: DISCONTINUED | OUTPATIENT
Start: 2019-01-01 | End: 2019-01-01 | Stop reason: HOSPADM

## 2019-01-01 RX ORDER — PREDNISONE 10 MG/1
TABLET ORAL
Qty: 26 TABLET | Refills: 0 | Status: SHIPPED | OUTPATIENT
Start: 2019-01-01 | End: 2019-01-01

## 2019-01-01 RX ORDER — SIMVASTATIN 20 MG
20 TABLET ORAL NIGHTLY
Qty: 90 TABLET | Refills: 1 | Status: SHIPPED | OUTPATIENT
Start: 2019-01-01 | End: 2019-01-01 | Stop reason: SDUPTHER

## 2019-01-01 RX ORDER — FOLIC ACID 1 MG/1
1 TABLET ORAL DAILY
Status: CANCELLED | OUTPATIENT
Start: 2019-01-01

## 2019-01-01 RX ORDER — SIMVASTATIN 20 MG
20 TABLET ORAL NIGHTLY
Status: CANCELLED | OUTPATIENT
Start: 2019-01-01

## 2019-01-01 RX ORDER — DABIGATRAN ETEXILATE 150 MG/1
150 CAPSULE, COATED PELLETS ORAL 2 TIMES DAILY
Status: DISCONTINUED | OUTPATIENT
Start: 2019-01-01 | End: 2019-01-01

## 2019-01-01 RX ORDER — LORAZEPAM 2 MG/ML
4 INJECTION INTRAMUSCULAR
Status: DISCONTINUED | OUTPATIENT
Start: 2019-01-01 | End: 2019-01-01

## 2019-01-01 RX ORDER — DOXYCYCLINE HYCLATE 100 MG
100 TABLET ORAL EVERY 12 HOURS SCHEDULED
Status: COMPLETED | OUTPATIENT
Start: 2019-01-01 | End: 2019-01-01

## 2019-01-01 RX ORDER — 0.9 % SODIUM CHLORIDE 0.9 %
10 VIAL (ML) INJECTION DAILY
Status: DISCONTINUED | OUTPATIENT
Start: 2019-01-01 | End: 2019-01-01

## 2019-01-01 RX ORDER — GUAIFENESIN/DEXTROMETHORPHAN 100-10MG/5
5 SYRUP ORAL EVERY 4 HOURS PRN
Qty: 120 ML | Refills: 0 | Status: SHIPPED | OUTPATIENT
Start: 2019-01-01 | End: 2019-01-01

## 2019-01-01 RX ORDER — THIAMINE MONONITRATE (VIT B1) 100 MG
100 TABLET ORAL DAILY
Status: CANCELLED | OUTPATIENT
Start: 2019-01-01

## 2019-01-01 RX ORDER — ONDANSETRON 4 MG/1
4 TABLET, FILM COATED ORAL EVERY 8 HOURS PRN
Status: DISCONTINUED | OUTPATIENT
Start: 2019-01-01 | End: 2019-01-01 | Stop reason: HOSPADM

## 2019-01-01 RX ORDER — CLONIDINE HYDROCHLORIDE 0.2 MG/1
0.2 TABLET ORAL 3 TIMES DAILY
Qty: 60 TABLET | Refills: 3 | Status: SHIPPED | OUTPATIENT
Start: 2019-01-01 | End: 2019-01-01 | Stop reason: SDUPTHER

## 2019-01-01 RX ORDER — AMLODIPINE BESYLATE 5 MG/1
5 TABLET ORAL DAILY
Status: CANCELLED | OUTPATIENT
Start: 2019-01-01

## 2019-01-01 RX ORDER — SODIUM CHLORIDE 9 MG/ML
10 INJECTION INTRAVENOUS DAILY
Status: DISCONTINUED | OUTPATIENT
Start: 2019-01-01 | End: 2019-11-10 | Stop reason: HOSPADM

## 2019-01-01 RX ORDER — LORAZEPAM 2 MG/ML
4 INJECTION INTRAMUSCULAR
Status: DISCONTINUED | OUTPATIENT
Start: 2019-01-01 | End: 2019-11-10 | Stop reason: HOSPADM

## 2019-01-01 RX ORDER — DOXYCYCLINE HYCLATE 100 MG
100 TABLET ORAL ONCE
Status: COMPLETED | OUTPATIENT
Start: 2019-01-01 | End: 2019-01-01

## 2019-01-01 RX ORDER — SODIUM CHLORIDE 0.9 % (FLUSH) 0.9 %
10 SYRINGE (ML) INJECTION PRN
Status: DISCONTINUED | OUTPATIENT
Start: 2019-01-01 | End: 2019-01-01 | Stop reason: HOSPADM

## 2019-01-01 RX ORDER — THIAMINE HYDROCHLORIDE 100 MG/ML
100 INJECTION, SOLUTION INTRAMUSCULAR; INTRAVENOUS DAILY
Status: DISCONTINUED | OUTPATIENT
Start: 2019-01-01 | End: 2019-01-01

## 2019-01-01 RX ORDER — GUAIFENESIN/DEXTROMETHORPHAN 100-10MG/5
5 SYRUP ORAL EVERY 4 HOURS PRN
Status: DISCONTINUED | OUTPATIENT
Start: 2019-01-01 | End: 2019-01-01

## 2019-01-01 RX ORDER — SODIUM CHLORIDE 0.9 % (FLUSH) 0.9 %
10 SYRINGE (ML) INJECTION PRN
Status: DISCONTINUED | OUTPATIENT
Start: 2019-01-01 | End: 2019-11-10 | Stop reason: HOSPADM

## 2019-01-01 RX ORDER — LANOLIN ALCOHOL/MO/W.PET/CERES
3 CREAM (GRAM) TOPICAL NIGHTLY PRN
Status: DISCONTINUED | OUTPATIENT
Start: 2019-01-01 | End: 2019-01-01

## 2019-01-01 RX ORDER — GUAIFENESIN/DEXTROMETHORPHAN 100-10MG/5
5 SYRUP ORAL EVERY 4 HOURS PRN
Status: DISCONTINUED | OUTPATIENT
Start: 2019-01-01 | End: 2019-01-01 | Stop reason: HOSPADM

## 2019-01-01 RX ORDER — NICOTINE POLACRILEX 4 MG
15 LOZENGE BUCCAL PRN
Status: CANCELLED | OUTPATIENT
Start: 2019-01-01

## 2019-01-01 RX ORDER — LANOLIN ALCOHOL/MO/W.PET/CERES
100 CREAM (GRAM) TOPICAL DAILY
Qty: 30 TABLET | Refills: 3 | Status: SHIPPED | OUTPATIENT
Start: 2019-01-01 | End: 2019-01-01 | Stop reason: SDUPTHER

## 2019-01-01 RX ORDER — FUROSEMIDE 20 MG/1
TABLET ORAL
Qty: 120 TABLET | Refills: 3 | Status: SHIPPED | OUTPATIENT
Start: 2019-01-01 | End: 2019-01-01 | Stop reason: SDUPTHER

## 2019-01-01 RX ORDER — LORAZEPAM 2 MG/ML
1 INJECTION INTRAMUSCULAR
Status: DISCONTINUED | OUTPATIENT
Start: 2019-01-01 | End: 2019-01-01

## 2019-01-01 RX ORDER — THIAMINE MONONITRATE (VIT B1) 100 MG
100 TABLET ORAL DAILY
Status: DISCONTINUED | OUTPATIENT
Start: 2019-01-01 | End: 2019-11-10 | Stop reason: HOSPADM

## 2019-01-01 RX ORDER — ACETAMINOPHEN 325 MG/1
650 TABLET ORAL EVERY 4 HOURS PRN
Status: DISCONTINUED | OUTPATIENT
Start: 2019-01-01 | End: 2019-01-01 | Stop reason: HOSPADM

## 2019-01-01 RX ORDER — PANTOPRAZOLE SODIUM 40 MG/1
40 TABLET, DELAYED RELEASE ORAL
Status: DISCONTINUED | OUTPATIENT
Start: 2019-01-01 | End: 2019-01-01

## 2019-01-01 RX ORDER — SIMVASTATIN 20 MG
20 TABLET ORAL NIGHTLY
Status: DISCONTINUED | OUTPATIENT
Start: 2019-01-01 | End: 2019-01-01 | Stop reason: HOSPADM

## 2019-01-01 RX ORDER — FOLIC ACID 1 MG/1
1 TABLET ORAL DAILY
Qty: 30 TABLET | Refills: 3 | Status: SHIPPED | OUTPATIENT
Start: 2019-01-01 | End: 2019-01-01 | Stop reason: SDUPTHER

## 2019-01-01 RX ORDER — HYDROCODONE BITARTRATE AND ACETAMINOPHEN 5; 325 MG/1; MG/1
1 TABLET ORAL EVERY 8 HOURS PRN
Qty: 90 TABLET | Refills: 0 | Status: SHIPPED | OUTPATIENT
Start: 2019-01-01 | End: 2019-01-01

## 2019-01-01 RX ORDER — FOLIC ACID 1 MG/1
1 TABLET ORAL DAILY
Status: DISCONTINUED | OUTPATIENT
Start: 2019-01-01 | End: 2019-11-10 | Stop reason: HOSPADM

## 2019-01-01 RX ORDER — CYCLOBENZAPRINE HCL 10 MG
10 TABLET ORAL 3 TIMES DAILY PRN
Status: DISCONTINUED | OUTPATIENT
Start: 2019-01-01 | End: 2019-01-01 | Stop reason: HOSPADM

## 2019-01-01 RX ORDER — CLONIDINE HYDROCHLORIDE 0.1 MG/1
0.2 TABLET ORAL 2 TIMES DAILY
Status: DISCONTINUED | OUTPATIENT
Start: 2019-01-01 | End: 2019-11-10 | Stop reason: HOSPADM

## 2019-01-01 RX ORDER — ACETAMINOPHEN 500 MG
500 TABLET ORAL EVERY 6 HOURS PRN
COMMUNITY

## 2019-01-01 RX ORDER — PANTOPRAZOLE SODIUM 40 MG/10ML
40 INJECTION, POWDER, LYOPHILIZED, FOR SOLUTION INTRAVENOUS DAILY
Status: DISCONTINUED | OUTPATIENT
Start: 2019-01-01 | End: 2019-11-10 | Stop reason: HOSPADM

## 2019-01-01 RX ORDER — GUAIFENESIN/DEXTROMETHORPHAN 100-10MG/5
5 SYRUP ORAL EVERY 4 HOURS PRN
Qty: 120 ML | Refills: 0 | Status: ON HOLD | OUTPATIENT
Start: 2019-01-01 | End: 2019-01-01

## 2019-01-01 RX ORDER — FOLIC ACID 1 MG/1
1 TABLET ORAL DAILY
Status: DISCONTINUED | OUTPATIENT
Start: 2019-01-01 | End: 2019-01-01 | Stop reason: HOSPADM

## 2019-01-01 RX ORDER — GUAIFENESIN/DEXTROMETHORPHAN 100-10MG/5
5 SYRUP ORAL EVERY 4 HOURS PRN
Qty: 120 ML | Refills: 0 | Status: SHIPPED | OUTPATIENT
Start: 2019-01-01 | End: 2019-01-01 | Stop reason: SDUPTHER

## 2019-01-01 RX ORDER — FUROSEMIDE 40 MG/1
40 TABLET ORAL DAILY
Status: DISCONTINUED | OUTPATIENT
Start: 2019-01-01 | End: 2019-01-01 | Stop reason: HOSPADM

## 2019-01-01 RX ORDER — CARVEDILOL 25 MG/1
25 TABLET ORAL 2 TIMES DAILY
Status: DISCONTINUED | OUTPATIENT
Start: 2019-01-01 | End: 2019-01-01

## 2019-01-01 RX ORDER — ATROPINE SULFATE 1 MG/ML
INJECTION, SOLUTION INTRAMUSCULAR; INTRAVENOUS; SUBCUTANEOUS
Status: COMPLETED | OUTPATIENT
Start: 2019-01-01 | End: 2019-01-01

## 2019-01-01 RX ORDER — PREDNISONE 20 MG/1
40 TABLET ORAL DAILY
Status: DISCONTINUED | OUTPATIENT
Start: 2019-01-01 | End: 2019-01-01 | Stop reason: HOSPADM

## 2019-01-01 RX ORDER — SODIUM CHLORIDE 9 MG/ML
INJECTION, SOLUTION INTRAVENOUS CONTINUOUS
Status: DISCONTINUED | OUTPATIENT
Start: 2019-01-01 | End: 2019-01-01

## 2019-01-01 RX ORDER — LORAZEPAM 1 MG/1
4 TABLET ORAL
Status: DISCONTINUED | OUTPATIENT
Start: 2019-01-01 | End: 2019-01-01

## 2019-01-01 RX ORDER — ONDANSETRON 2 MG/ML
4 INJECTION INTRAMUSCULAR; INTRAVENOUS EVERY 6 HOURS PRN
Status: DISCONTINUED | OUTPATIENT
Start: 2019-01-01 | End: 2019-01-01 | Stop reason: HOSPADM

## 2019-01-01 RX ORDER — SODIUM CHLORIDE 0.9 % (FLUSH) 0.9 %
10 SYRINGE (ML) INJECTION EVERY 12 HOURS SCHEDULED
Status: DISCONTINUED | OUTPATIENT
Start: 2019-01-01 | End: 2019-01-01 | Stop reason: HOSPADM

## 2019-01-01 RX ORDER — LISINOPRIL 5 MG/1
5 TABLET ORAL DAILY
Status: DISCONTINUED | OUTPATIENT
Start: 2019-01-01 | End: 2019-01-01

## 2019-01-01 RX ORDER — METOPROLOL TARTRATE 5 MG/5ML
5 INJECTION INTRAVENOUS ONCE
Status: COMPLETED | OUTPATIENT
Start: 2019-01-01 | End: 2019-01-01

## 2019-01-01 RX ORDER — KETOROLAC TROMETHAMINE 15 MG/ML
15 INJECTION, SOLUTION INTRAMUSCULAR; INTRAVENOUS EVERY 6 HOURS PRN
Status: DISCONTINUED | OUTPATIENT
Start: 2019-01-01 | End: 2019-01-01

## 2019-01-01 RX ORDER — FUROSEMIDE 20 MG/1
TABLET ORAL
Qty: 120 TABLET | Refills: 3 | Status: SHIPPED | OUTPATIENT
Start: 2019-01-01

## 2019-01-01 RX ORDER — LANOLIN ALCOHOL/MO/W.PET/CERES
3 CREAM (GRAM) TOPICAL NIGHTLY PRN
Status: CANCELLED | OUTPATIENT
Start: 2019-01-01

## 2019-01-01 RX ORDER — FUROSEMIDE 20 MG/1
20 TABLET ORAL DAILY
Status: DISCONTINUED | OUTPATIENT
Start: 2019-01-01 | End: 2019-01-01

## 2019-01-01 RX ORDER — CALCIUM CARBONATE 200(500)MG
500 TABLET,CHEWABLE ORAL 3 TIMES DAILY PRN
Status: DISCONTINUED | OUTPATIENT
Start: 2019-01-01 | End: 2019-01-01 | Stop reason: HOSPADM

## 2019-01-01 RX ORDER — TRIAMCINOLONE ACETONIDE 40 MG/ML
80 INJECTION, SUSPENSION INTRA-ARTICULAR; INTRAMUSCULAR ONCE
Status: COMPLETED | OUTPATIENT
Start: 2019-01-01 | End: 2019-01-01

## 2019-01-01 RX ORDER — LORAZEPAM 1 MG/1
3 TABLET ORAL
Status: DISCONTINUED | OUTPATIENT
Start: 2019-01-01 | End: 2019-11-10 | Stop reason: HOSPADM

## 2019-01-01 RX ORDER — SODIUM CHLORIDE 0.9 % (FLUSH) 0.9 %
10 SYRINGE (ML) INJECTION EVERY 12 HOURS SCHEDULED
Status: DISCONTINUED | OUTPATIENT
Start: 2019-01-01 | End: 2019-11-10 | Stop reason: HOSPADM

## 2019-01-01 RX ORDER — PREDNISONE 10 MG/1
TABLET ORAL
Qty: 26 TABLET | Refills: 0 | Status: SHIPPED | OUTPATIENT
Start: 2019-01-01 | End: 2019-01-01 | Stop reason: SDUPTHER

## 2019-01-01 RX ORDER — SIMVASTATIN 20 MG
20 TABLET ORAL NIGHTLY
Status: DISCONTINUED | OUTPATIENT
Start: 2019-01-01 | End: 2019-11-10 | Stop reason: HOSPADM

## 2019-01-01 RX ORDER — GUAIFENESIN/DEXTROMETHORPHAN 100-10MG/5
5 SYRUP ORAL EVERY 4 HOURS PRN
Status: CANCELLED | OUTPATIENT
Start: 2019-01-01

## 2019-01-01 RX ORDER — CLONIDINE HYDROCHLORIDE 0.1 MG/1
0.1 TABLET ORAL 2 TIMES DAILY
Qty: 60 TABLET | Refills: 3 | Status: ON HOLD | OUTPATIENT
Start: 2019-01-01 | End: 2019-01-01 | Stop reason: HOSPADM

## 2019-01-01 RX ORDER — POLYETHYLENE GLYCOL 3350 17 G/17G
17 POWDER, FOR SOLUTION ORAL DAILY
Status: CANCELLED | OUTPATIENT
Start: 2019-01-01

## 2019-01-01 RX ORDER — ACETAMINOPHEN 325 MG/1
650 TABLET ORAL EVERY 8 HOURS PRN
Status: DISCONTINUED | OUTPATIENT
Start: 2019-01-01 | End: 2019-01-01 | Stop reason: HOSPADM

## 2019-01-01 RX ORDER — LANOLIN ALCOHOL/MO/W.PET/CERES
100 CREAM (GRAM) TOPICAL DAILY
Qty: 30 TABLET | Refills: 3 | Status: SHIPPED | OUTPATIENT
Start: 2019-01-01

## 2019-01-01 RX ORDER — EPINEPHRINE 1 MG/ML
INJECTION, SOLUTION, CONCENTRATE INTRAVENOUS
Status: COMPLETED | OUTPATIENT
Start: 2019-01-01 | End: 2019-01-01

## 2019-01-01 RX ORDER — LORAZEPAM 1 MG/1
1 TABLET ORAL
Status: DISCONTINUED | OUTPATIENT
Start: 2019-01-01 | End: 2019-01-01

## 2019-01-01 RX ORDER — IPRATROPIUM BROMIDE AND ALBUTEROL SULFATE 2.5; .5 MG/3ML; MG/3ML
1 SOLUTION RESPIRATORY (INHALATION)
Status: DISCONTINUED | OUTPATIENT
Start: 2019-01-01 | End: 2019-01-01

## 2019-01-01 RX ORDER — CARVEDILOL 25 MG/1
25 TABLET ORAL ONCE
Status: COMPLETED | OUTPATIENT
Start: 2019-01-01 | End: 2019-01-01

## 2019-01-01 RX ORDER — LABETALOL HYDROCHLORIDE 5 MG/ML
10 INJECTION, SOLUTION INTRAVENOUS EVERY 4 HOURS PRN
Status: DISCONTINUED | OUTPATIENT
Start: 2019-01-01 | End: 2019-01-01 | Stop reason: HOSPADM

## 2019-01-01 RX ORDER — LORAZEPAM 0.5 MG/1
0.5 TABLET ORAL ONCE
Status: DISCONTINUED | OUTPATIENT
Start: 2019-01-01 | End: 2019-01-01

## 2019-01-01 RX ORDER — MAGNESIUM SULFATE IN WATER 40 MG/ML
2 INJECTION, SOLUTION INTRAVENOUS ONCE
Status: COMPLETED | OUTPATIENT
Start: 2019-01-01 | End: 2019-01-01

## 2019-01-01 RX ORDER — SENNA AND DOCUSATE SODIUM 50; 8.6 MG/1; MG/1
2 TABLET, FILM COATED ORAL 2 TIMES DAILY
Status: CANCELLED | OUTPATIENT
Start: 2019-01-01

## 2019-01-01 RX ORDER — DABIGATRAN ETEXILATE 150 MG/1
150 CAPSULE, COATED PELLETS ORAL 2 TIMES DAILY
Status: DISCONTINUED | OUTPATIENT
Start: 2019-01-01 | End: 2019-11-10 | Stop reason: HOSPADM

## 2019-01-01 RX ORDER — LORAZEPAM 1 MG/1
2 TABLET ORAL
Status: DISCONTINUED | OUTPATIENT
Start: 2019-01-01 | End: 2019-01-01

## 2019-01-01 RX ORDER — CARVEDILOL 12.5 MG/1
37.5 TABLET ORAL 2 TIMES DAILY
Qty: 60 TABLET | Refills: 3 | Status: SHIPPED | OUTPATIENT
Start: 2019-01-01

## 2019-01-01 RX ORDER — SENNA AND DOCUSATE SODIUM 50; 8.6 MG/1; MG/1
2 TABLET, FILM COATED ORAL 2 TIMES DAILY
Status: DISCONTINUED | OUTPATIENT
Start: 2019-01-01 | End: 2019-01-01 | Stop reason: HOSPADM

## 2019-01-01 RX ORDER — AMLODIPINE BESYLATE 5 MG/1
5 TABLET ORAL DAILY
Status: DISCONTINUED | OUTPATIENT
Start: 2019-01-01 | End: 2019-11-10 | Stop reason: HOSPADM

## 2019-01-01 RX ORDER — HYDROCODONE BITARTRATE AND ACETAMINOPHEN 5; 325 MG/1; MG/1
1 TABLET ORAL EVERY 8 HOURS PRN
Status: DISCONTINUED | OUTPATIENT
Start: 2019-01-01 | End: 2019-11-10 | Stop reason: HOSPADM

## 2019-01-01 RX ORDER — LABETALOL HYDROCHLORIDE 5 MG/ML
10 INJECTION, SOLUTION INTRAVENOUS ONCE
Status: COMPLETED | OUTPATIENT
Start: 2019-01-01 | End: 2019-01-01

## 2019-01-01 RX ORDER — 0.9 % SODIUM CHLORIDE 0.9 %
1000 INTRAVENOUS SOLUTION INTRAVENOUS ONCE
Status: DISCONTINUED | OUTPATIENT
Start: 2019-01-01 | End: 2019-01-01

## 2019-01-01 RX ORDER — LINEZOLID 2 MG/ML
600 INJECTION, SOLUTION INTRAVENOUS EVERY 12 HOURS
Status: DISCONTINUED | OUTPATIENT
Start: 2019-01-01 | End: 2019-11-10 | Stop reason: HOSPADM

## 2019-01-01 RX ORDER — ONDANSETRON 2 MG/ML
4 INJECTION INTRAMUSCULAR; INTRAVENOUS EVERY 6 HOURS PRN
Status: DISCONTINUED | OUTPATIENT
Start: 2019-01-01 | End: 2019-11-10 | Stop reason: HOSPADM

## 2019-01-01 RX ORDER — LORAZEPAM 2 MG/ML
3 INJECTION INTRAMUSCULAR
Status: DISCONTINUED | OUTPATIENT
Start: 2019-01-01 | End: 2019-11-10 | Stop reason: HOSPADM

## 2019-01-01 RX ORDER — 0.9 % SODIUM CHLORIDE 0.9 %
500 INTRAVENOUS SOLUTION INTRAVENOUS ONCE
Status: COMPLETED | OUTPATIENT
Start: 2019-01-01 | End: 2019-01-01

## 2019-01-01 RX ORDER — LORAZEPAM 2 MG/ML
1 INJECTION INTRAMUSCULAR
Status: DISCONTINUED | OUTPATIENT
Start: 2019-01-01 | End: 2019-11-10 | Stop reason: HOSPADM

## 2019-01-01 RX ORDER — FUROSEMIDE 40 MG/1
40 TABLET ORAL DAILY
Status: DISCONTINUED | OUTPATIENT
Start: 2019-01-01 | End: 2019-01-01

## 2019-01-01 RX ORDER — LANOLIN ALCOHOL/MO/W.PET/CERES
3 CREAM (GRAM) TOPICAL ONCE
Status: COMPLETED | OUTPATIENT
Start: 2019-01-01 | End: 2019-01-01

## 2019-01-01 RX ORDER — PREDNISONE 20 MG/1
40 TABLET ORAL DAILY
Status: DISCONTINUED | OUTPATIENT
Start: 2019-01-01 | End: 2019-01-01

## 2019-01-01 RX ORDER — CLONIDINE HYDROCHLORIDE 0.1 MG/1
0.1 TABLET ORAL 3 TIMES DAILY
Status: DISCONTINUED | OUTPATIENT
Start: 2019-01-01 | End: 2019-01-01

## 2019-01-01 RX ORDER — LORAZEPAM 1 MG/1
4 TABLET ORAL
Status: DISCONTINUED | OUTPATIENT
Start: 2019-01-01 | End: 2019-11-10 | Stop reason: HOSPADM

## 2019-01-01 RX ORDER — LABETALOL HYDROCHLORIDE 5 MG/ML
10 INJECTION, SOLUTION INTRAVENOUS ONCE
Status: DISCONTINUED | OUTPATIENT
Start: 2019-01-01 | End: 2019-01-01

## 2019-01-01 RX ORDER — HYDRALAZINE HYDROCHLORIDE 20 MG/ML
10 INJECTION INTRAMUSCULAR; INTRAVENOUS EVERY 6 HOURS PRN
Status: DISCONTINUED | OUTPATIENT
Start: 2019-01-01 | End: 2019-01-01 | Stop reason: HOSPADM

## 2019-01-01 RX ORDER — LORAZEPAM 2 MG/ML
2 INJECTION INTRAMUSCULAR
Status: DISCONTINUED | OUTPATIENT
Start: 2019-01-01 | End: 2019-11-10 | Stop reason: HOSPADM

## 2019-01-01 RX ORDER — SODIUM CHLORIDE 0.9 % (FLUSH) 0.9 %
10 SYRINGE (ML) INJECTION PRN
Status: DISCONTINUED | OUTPATIENT
Start: 2019-01-01 | End: 2019-01-01 | Stop reason: SDUPTHER

## 2019-01-01 RX ORDER — LANOLIN ALCOHOL/MO/W.PET/CERES
3 CREAM (GRAM) TOPICAL NIGHTLY PRN
Status: DISCONTINUED | OUTPATIENT
Start: 2019-01-01 | End: 2019-01-01 | Stop reason: HOSPADM

## 2019-01-01 RX ORDER — DEXTROSE MONOHYDRATE 25 G/50ML
12.5 INJECTION, SOLUTION INTRAVENOUS PRN
Status: DISCONTINUED | OUTPATIENT
Start: 2019-01-01 | End: 2019-01-01 | Stop reason: HOSPADM

## 2019-01-01 RX ORDER — BISACODYL 10 MG
10 SUPPOSITORY, RECTAL RECTAL DAILY PRN
Status: CANCELLED | OUTPATIENT
Start: 2019-01-01

## 2019-01-01 RX ORDER — LISINOPRIL 20 MG/1
20 TABLET ORAL DAILY
Qty: 30 TABLET | Refills: 3 | Status: ON HOLD | OUTPATIENT
Start: 2019-01-01 | End: 2019-01-01 | Stop reason: HOSPADM

## 2019-01-01 RX ORDER — DEXTROSE MONOHYDRATE 50 MG/ML
100 INJECTION, SOLUTION INTRAVENOUS PRN
Status: DISCONTINUED | OUTPATIENT
Start: 2019-01-01 | End: 2019-01-01 | Stop reason: HOSPADM

## 2019-01-01 RX ORDER — LISINOPRIL 20 MG/1
20 TABLET ORAL DAILY
Status: CANCELLED | OUTPATIENT
Start: 2019-01-01

## 2019-01-01 RX ORDER — PROMETHAZINE HYDROCHLORIDE 25 MG/ML
12.5 INJECTION, SOLUTION INTRAMUSCULAR; INTRAVENOUS EVERY 6 HOURS PRN
Status: DISCONTINUED | OUTPATIENT
Start: 2019-01-01 | End: 2019-01-01 | Stop reason: HOSPADM

## 2019-01-01 RX ORDER — CLONIDINE HYDROCHLORIDE 0.2 MG/1
0.2 TABLET ORAL 3 TIMES DAILY
Qty: 60 TABLET | Refills: 3 | Status: SHIPPED | OUTPATIENT
Start: 2019-01-01

## 2019-01-01 RX ADMIN — INSULIN LISPRO 3 UNITS: 100 INJECTION, SOLUTION INTRAVENOUS; SUBCUTANEOUS at 13:35

## 2019-01-01 RX ADMIN — AMLODIPINE BESYLATE 5 MG: 5 TABLET ORAL at 07:40

## 2019-01-01 RX ADMIN — SENNOSIDES, DOCUSATE SODIUM 2 TABLET: 50; 8.6 TABLET, FILM COATED ORAL at 20:49

## 2019-01-01 RX ADMIN — FUROSEMIDE 20 MG: 10 INJECTION, SOLUTION INTRAMUSCULAR; INTRAVENOUS at 08:17

## 2019-01-01 RX ADMIN — FUROSEMIDE 40 MG: 40 TABLET ORAL at 07:27

## 2019-01-01 RX ADMIN — DEXTROSE MONOHYDRATE 5 MG/HR: 50 INJECTION, SOLUTION INTRAVENOUS at 18:13

## 2019-01-01 RX ADMIN — INSULIN LISPRO 4 UNITS: 100 INJECTION, SOLUTION INTRAVENOUS; SUBCUTANEOUS at 08:28

## 2019-01-01 RX ADMIN — FOLIC ACID 1 MG: 1 TABLET ORAL at 08:00

## 2019-01-01 RX ADMIN — INSULIN LISPRO 2 UNITS: 100 INJECTION, SOLUTION INTRAVENOUS; SUBCUTANEOUS at 02:00

## 2019-01-01 RX ADMIN — DABIGATRAN ETEXILATE MESYLATE 150 MG: 150 CAPSULE ORAL at 08:17

## 2019-01-01 RX ADMIN — INSULIN LISPRO 2 UNITS: 100 INJECTION, SOLUTION INTRAVENOUS; SUBCUTANEOUS at 21:42

## 2019-01-01 RX ADMIN — FUROSEMIDE 40 MG: 40 TABLET ORAL at 09:21

## 2019-01-01 RX ADMIN — DABIGATRAN ETEXILATE MESYLATE 150 MG: 150 CAPSULE ORAL at 07:53

## 2019-01-01 RX ADMIN — INSULIN LISPRO 3 UNITS: 100 INJECTION, SOLUTION INTRAVENOUS; SUBCUTANEOUS at 17:40

## 2019-01-01 RX ADMIN — PANTOPRAZOLE SODIUM 40 MG: 40 TABLET, DELAYED RELEASE ORAL at 07:35

## 2019-01-01 RX ADMIN — AMLODIPINE BESYLATE 5 MG: 5 TABLET ORAL at 07:33

## 2019-01-01 RX ADMIN — SODIUM CHLORIDE: 9 INJECTION, SOLUTION INTRAVENOUS at 08:29

## 2019-01-01 RX ADMIN — PROMETHAZINE HYDROCHLORIDE 12.5 MG: 25 INJECTION INTRAMUSCULAR; INTRAVENOUS at 23:18

## 2019-01-01 RX ADMIN — ACETAMINOPHEN 650 MG: 325 TABLET ORAL at 22:12

## 2019-01-01 RX ADMIN — THIAMINE HYDROCHLORIDE 100 MG: 100 INJECTION, SOLUTION INTRAMUSCULAR; INTRAVENOUS at 10:04

## 2019-01-01 RX ADMIN — AMLODIPINE BESYLATE 5 MG: 5 TABLET ORAL at 09:21

## 2019-01-01 RX ADMIN — DOXYCYCLINE HYCLATE 100 MG: 100 TABLET, COATED ORAL at 08:18

## 2019-01-01 RX ADMIN — FUROSEMIDE 40 MG: 10 INJECTION, SOLUTION INTRAMUSCULAR; INTRAVENOUS at 09:43

## 2019-01-01 RX ADMIN — THIAMINE HCL (VITAMIN B1) 100 MG TABLET 100 MG: at 07:56

## 2019-01-01 RX ADMIN — INSULIN LISPRO 3 UNITS: 100 INJECTION, SOLUTION INTRAVENOUS; SUBCUTANEOUS at 13:00

## 2019-01-01 RX ADMIN — AMLODIPINE BESYLATE 5 MG: 5 TABLET ORAL at 22:25

## 2019-01-01 RX ADMIN — FUROSEMIDE 20 MG: 10 INJECTION, SOLUTION INTRAVENOUS at 17:07

## 2019-01-01 RX ADMIN — CLONIDINE HYDROCHLORIDE 0.1 MG: 0.1 TABLET ORAL at 21:35

## 2019-01-01 RX ADMIN — ANTACID TABLETS 500 MG: 500 TABLET, CHEWABLE ORAL at 09:43

## 2019-01-01 RX ADMIN — IPRATROPIUM BROMIDE AND ALBUTEROL SULFATE 1 AMPULE: .5; 3 SOLUTION RESPIRATORY (INHALATION) at 15:59

## 2019-01-01 RX ADMIN — CLONIDINE HYDROCHLORIDE 0.2 MG: 0.1 TABLET ORAL at 13:00

## 2019-01-01 RX ADMIN — Medication 100 MG: at 08:28

## 2019-01-01 RX ADMIN — DABIGATRAN ETEXILATE MESYLATE 150 MG: 150 CAPSULE ORAL at 08:30

## 2019-01-01 RX ADMIN — SODIUM CHLORIDE 1700 ML: 9 INJECTION, SOLUTION INTRAVENOUS at 08:17

## 2019-01-01 RX ADMIN — EPINEPHRINE 1 MG: 0.1 INJECTION INTRACARDIAC; INTRAVENOUS at 20:28

## 2019-01-01 RX ADMIN — INSULIN LISPRO 2 UNITS: 100 INJECTION, SOLUTION INTRAVENOUS; SUBCUTANEOUS at 21:15

## 2019-01-01 RX ADMIN — THIAMINE HCL (VITAMIN B1) 100 MG TABLET 100 MG: at 07:35

## 2019-01-01 RX ADMIN — INSULIN LISPRO 6 UNITS: 100 INJECTION, SOLUTION INTRAVENOUS; SUBCUTANEOUS at 08:05

## 2019-01-01 RX ADMIN — CARVEDILOL 25 MG: 25 TABLET, FILM COATED ORAL at 21:35

## 2019-01-01 RX ADMIN — PROCHLORPERAZINE EDISYLATE 10 MG: 5 INJECTION INTRAMUSCULAR; INTRAVENOUS at 07:46

## 2019-01-01 RX ADMIN — FOLIC ACID 1 MG: 1 TABLET ORAL at 07:43

## 2019-01-01 RX ADMIN — METFORMIN HYDROCHLORIDE 1000 MG: 500 TABLET ORAL at 17:46

## 2019-01-01 RX ADMIN — LABETALOL HYDROCHLORIDE 10 MG: 5 INJECTION INTRAVENOUS at 13:38

## 2019-01-01 RX ADMIN — FUROSEMIDE 40 MG: 40 TABLET ORAL at 07:46

## 2019-01-01 RX ADMIN — PREDNISONE 20 MG: 20 TABLET ORAL at 07:33

## 2019-01-01 RX ADMIN — POLYETHYLENE GLYCOL 3350 17 G: 17 POWDER, FOR SOLUTION ORAL at 09:16

## 2019-01-01 RX ADMIN — CLONIDINE HYDROCHLORIDE 0.2 MG: 0.1 TABLET ORAL at 21:59

## 2019-01-01 RX ADMIN — FUROSEMIDE 40 MG: 40 TABLET ORAL at 08:30

## 2019-01-01 RX ADMIN — IPRATROPIUM BROMIDE AND ALBUTEROL SULFATE 1 AMPULE: .5; 3 SOLUTION RESPIRATORY (INHALATION) at 19:32

## 2019-01-01 RX ADMIN — Medication 10 ML: at 09:09

## 2019-01-01 RX ADMIN — CLONIDINE HYDROCHLORIDE 0.2 MG: 0.1 TABLET ORAL at 20:00

## 2019-01-01 RX ADMIN — GUAIFENESIN AND DEXTROMETHORPHAN 5 ML: 100; 10 SYRUP ORAL at 21:57

## 2019-01-01 RX ADMIN — CLONIDINE HYDROCHLORIDE 0.1 MG: 0.1 TABLET ORAL at 08:34

## 2019-01-01 RX ADMIN — Medication 100 MG: at 08:29

## 2019-01-01 RX ADMIN — HYDRALAZINE HYDROCHLORIDE 10 MG: 20 INJECTION INTRAMUSCULAR; INTRAVENOUS at 12:24

## 2019-01-01 RX ADMIN — VANCOMYCIN HYDROCHLORIDE 1000 MG: 1 INJECTION, POWDER, LYOPHILIZED, FOR SOLUTION INTRAVENOUS at 03:21

## 2019-01-01 RX ADMIN — CLONIDINE HYDROCHLORIDE 0.2 MG: 0.1 TABLET ORAL at 07:56

## 2019-01-01 RX ADMIN — HYDRALAZINE HYDROCHLORIDE 10 MG: 20 INJECTION INTRAMUSCULAR; INTRAVENOUS at 05:56

## 2019-01-01 RX ADMIN — METFORMIN HYDROCHLORIDE 1000 MG: 500 TABLET ORAL at 18:05

## 2019-01-01 RX ADMIN — INSULIN LISPRO 1 UNITS: 100 INJECTION, SOLUTION INTRAVENOUS; SUBCUTANEOUS at 22:24

## 2019-01-01 RX ADMIN — CARVEDILOL 25 MG: 25 TABLET, FILM COATED ORAL at 08:16

## 2019-01-01 RX ADMIN — CLONIDINE HYDROCHLORIDE 0.2 MG: 0.1 TABLET ORAL at 09:21

## 2019-01-01 RX ADMIN — LISINOPRIL 20 MG: 20 TABLET ORAL at 09:20

## 2019-01-01 RX ADMIN — CEFTRIAXONE 1 G: 1 INJECTION, POWDER, FOR SOLUTION INTRAMUSCULAR; INTRAVENOUS at 07:54

## 2019-01-01 RX ADMIN — SODIUM BICARBONATE 50 MEQ: 84 INJECTION INTRAVENOUS at 21:28

## 2019-01-01 RX ADMIN — CEFTRIAXONE SODIUM 2 G: 2 INJECTION, POWDER, FOR SOLUTION INTRAMUSCULAR; INTRAVENOUS at 09:41

## 2019-01-01 RX ADMIN — ONDANSETRON 4 MG: 2 INJECTION INTRAMUSCULAR; INTRAVENOUS at 10:31

## 2019-01-01 RX ADMIN — LISINOPRIL 5 MG: 5 TABLET ORAL at 15:29

## 2019-01-01 RX ADMIN — SIMVASTATIN 20 MG: 20 TABLET, FILM COATED ORAL at 21:07

## 2019-01-01 RX ADMIN — THIAMINE HCL (VITAMIN B1) 100 MG TABLET 100 MG: at 08:30

## 2019-01-01 RX ADMIN — DABIGATRAN ETEXILATE MESYLATE 150 MG: 150 CAPSULE ORAL at 09:09

## 2019-01-01 RX ADMIN — METFORMIN HYDROCHLORIDE 1000 MG: 500 TABLET ORAL at 17:09

## 2019-01-01 RX ADMIN — DABIGATRAN ETEXILATE MESYLATE 150 MG: 150 CAPSULE ORAL at 08:18

## 2019-01-01 RX ADMIN — PANTOPRAZOLE SODIUM 40 MG: 40 TABLET, DELAYED RELEASE ORAL at 06:06

## 2019-01-01 RX ADMIN — DABIGATRAN ETEXILATE MESYLATE 150 MG: 150 CAPSULE ORAL at 07:36

## 2019-01-01 RX ADMIN — AMLODIPINE BESYLATE 5 MG: 5 TABLET ORAL at 07:43

## 2019-01-01 RX ADMIN — INSULIN LISPRO 1 UNITS: 100 INJECTION, SOLUTION INTRAVENOUS; SUBCUTANEOUS at 20:42

## 2019-01-01 RX ADMIN — SODIUM CHLORIDE, PRESERVATIVE FREE 10 ML: 5 INJECTION INTRAVENOUS at 21:01

## 2019-01-01 RX ADMIN — CLONIDINE HYDROCHLORIDE 0.2 MG: 0.1 TABLET ORAL at 20:31

## 2019-01-01 RX ADMIN — MELATONIN 3 MG: at 20:36

## 2019-01-01 RX ADMIN — CLONIDINE HYDROCHLORIDE 0.2 MG: 0.1 TABLET ORAL at 22:25

## 2019-01-01 RX ADMIN — CLONIDINE HYDROCHLORIDE 0.1 MG: 0.1 TABLET ORAL at 20:07

## 2019-01-01 RX ADMIN — HYDRALAZINE HYDROCHLORIDE 10 MG: 20 INJECTION INTRAMUSCULAR; INTRAVENOUS at 19:09

## 2019-01-01 RX ADMIN — LORAZEPAM 1 MG: 1 TABLET ORAL at 21:25

## 2019-01-01 RX ADMIN — SODIUM CHLORIDE, PRESERVATIVE FREE 10 ML: 5 INJECTION INTRAVENOUS at 09:24

## 2019-01-01 RX ADMIN — LORAZEPAM 0.5 MG: 2 INJECTION INTRAMUSCULAR; INTRAVENOUS at 13:38

## 2019-01-01 RX ADMIN — INSULIN LISPRO 3 UNITS: 100 INJECTION, SOLUTION INTRAVENOUS; SUBCUTANEOUS at 17:32

## 2019-01-01 RX ADMIN — ACETAMINOPHEN 650 MG: 325 TABLET ORAL at 09:39

## 2019-01-01 RX ADMIN — HYDRALAZINE HYDROCHLORIDE 10 MG: 20 INJECTION INTRAMUSCULAR; INTRAVENOUS at 17:09

## 2019-01-01 RX ADMIN — CYCLOBENZAPRINE HYDROCHLORIDE 10 MG: 10 TABLET, FILM COATED ORAL at 15:48

## 2019-01-01 RX ADMIN — CLONIDINE HYDROCHLORIDE 0.2 MG: 0.1 TABLET ORAL at 07:28

## 2019-01-01 RX ADMIN — PREDNISONE 40 MG: 20 TABLET ORAL at 09:19

## 2019-01-01 RX ADMIN — FOLIC ACID 1 MG: 1 TABLET ORAL at 07:20

## 2019-01-01 RX ADMIN — METFORMIN HYDROCHLORIDE 1000 MG: 500 TABLET ORAL at 07:35

## 2019-01-01 RX ADMIN — CARVEDILOL 37.5 MG: 25 TABLET, FILM COATED ORAL at 07:55

## 2019-01-01 RX ADMIN — DABIGATRAN ETEXILATE MESYLATE 150 MG: 150 CAPSULE ORAL at 09:32

## 2019-01-01 RX ADMIN — CLONIDINE HYDROCHLORIDE 0.2 MG: 0.1 TABLET ORAL at 21:07

## 2019-01-01 RX ADMIN — SIMVASTATIN 20 MG: 20 TABLET, FILM COATED ORAL at 21:08

## 2019-01-01 RX ADMIN — FOLIC ACID 1 MG: 1 TABLET ORAL at 09:41

## 2019-01-01 RX ADMIN — CARVEDILOL 25 MG: 25 TABLET, FILM COATED ORAL at 20:07

## 2019-01-01 RX ADMIN — INSULIN LISPRO 6 UNITS: 100 INJECTION, SOLUTION INTRAVENOUS; SUBCUTANEOUS at 09:21

## 2019-01-01 RX ADMIN — ACETAMINOPHEN 650 MG: 325 TABLET ORAL at 10:43

## 2019-01-01 RX ADMIN — SIMVASTATIN 20 MG: 20 TABLET, FILM COATED ORAL at 20:25

## 2019-01-01 RX ADMIN — SODIUM CHLORIDE: 9 INJECTION, SOLUTION INTRAVENOUS at 08:50

## 2019-01-01 RX ADMIN — INSULIN LISPRO 1 UNITS: 100 INJECTION, SOLUTION INTRAVENOUS; SUBCUTANEOUS at 12:24

## 2019-01-01 RX ADMIN — INSULIN LISPRO 3 UNITS: 100 INJECTION, SOLUTION INTRAVENOUS; SUBCUTANEOUS at 17:24

## 2019-01-01 RX ADMIN — INSULIN LISPRO 3 UNITS: 100 INJECTION, SOLUTION INTRAVENOUS; SUBCUTANEOUS at 17:09

## 2019-01-01 RX ADMIN — PREDNISONE 40 MG: 20 TABLET ORAL at 07:56

## 2019-01-01 RX ADMIN — ACETAMINOPHEN 650 MG: 325 TABLET ORAL at 09:11

## 2019-01-01 RX ADMIN — CARVEDILOL 37.5 MG: 25 TABLET, FILM COATED ORAL at 07:42

## 2019-01-01 RX ADMIN — HYDRALAZINE HYDROCHLORIDE 10 MG: 20 INJECTION INTRAMUSCULAR; INTRAVENOUS at 04:32

## 2019-01-01 RX ADMIN — AMLODIPINE BESYLATE 5 MG: 5 TABLET ORAL at 07:57

## 2019-01-01 RX ADMIN — FOLIC ACID 1 MG: 1 TABLET ORAL at 08:34

## 2019-01-01 RX ADMIN — VANCOMYCIN HYDROCHLORIDE 1250 MG: 10 INJECTION, POWDER, LYOPHILIZED, FOR SOLUTION INTRAVENOUS at 13:12

## 2019-01-01 RX ADMIN — MAGNESIUM SULFATE HEPTAHYDRATE 2 G: 40 INJECTION, SOLUTION INTRAVENOUS at 09:24

## 2019-01-01 RX ADMIN — LORAZEPAM 1 MG: 2 INJECTION INTRAMUSCULAR; INTRAVENOUS at 20:44

## 2019-01-01 RX ADMIN — LISINOPRIL 20 MG: 20 TABLET ORAL at 08:30

## 2019-01-01 RX ADMIN — Medication 100 MG: at 09:19

## 2019-01-01 RX ADMIN — CARVEDILOL 37.5 MG: 25 TABLET, FILM COATED ORAL at 07:53

## 2019-01-01 RX ADMIN — METFORMIN HYDROCHLORIDE 1000 MG: 500 TABLET ORAL at 12:05

## 2019-01-01 RX ADMIN — CLONIDINE HYDROCHLORIDE 0.2 MG: 0.1 TABLET ORAL at 07:20

## 2019-01-01 RX ADMIN — SENNOSIDES, DOCUSATE SODIUM 2 TABLET: 50; 8.6 TABLET, FILM COATED ORAL at 07:28

## 2019-01-01 RX ADMIN — LISINOPRIL 20 MG: 20 TABLET ORAL at 12:20

## 2019-01-01 RX ADMIN — CLONIDINE HYDROCHLORIDE 0.1 MG: 0.1 TABLET ORAL at 08:18

## 2019-01-01 RX ADMIN — DABIGATRAN ETEXILATE MESYLATE 150 MG: 150 CAPSULE ORAL at 07:40

## 2019-01-01 RX ADMIN — CLONIDINE HYDROCHLORIDE 0.2 MG: 0.1 TABLET ORAL at 20:56

## 2019-01-01 RX ADMIN — DEXTROSE MONOHYDRATE 2.5 MG/HR: 50 INJECTION, SOLUTION INTRAVENOUS at 23:53

## 2019-01-01 RX ADMIN — IOPAMIDOL 75 ML: 755 INJECTION, SOLUTION INTRAVENOUS at 17:50

## 2019-01-01 RX ADMIN — SODIUM CHLORIDE, PRESERVATIVE FREE 10 ML: 5 INJECTION INTRAVENOUS at 20:32

## 2019-01-01 RX ADMIN — FOLIC ACID 1 MG: 1 TABLET ORAL at 09:10

## 2019-01-01 RX ADMIN — DABIGATRAN ETEXILATE MESYLATE 150 MG: 150 CAPSULE ORAL at 20:48

## 2019-01-01 RX ADMIN — CLONIDINE HYDROCHLORIDE 0.2 MG: 0.1 TABLET ORAL at 13:13

## 2019-01-01 RX ADMIN — THIAMINE HYDROCHLORIDE 100 MG: 100 INJECTION, SOLUTION INTRAMUSCULAR; INTRAVENOUS at 08:38

## 2019-01-01 RX ADMIN — SENNOSIDES, DOCUSATE SODIUM 2 TABLET: 50; 8.6 TABLET, FILM COATED ORAL at 20:56

## 2019-01-01 RX ADMIN — METFORMIN HYDROCHLORIDE 1000 MG: 500 TABLET ORAL at 07:53

## 2019-01-01 RX ADMIN — MELATONIN 3 MG: at 23:30

## 2019-01-01 RX ADMIN — METHYLPREDNISOLONE ACETATE 80 MG: 40 INJECTION, SUSPENSION INTRA-ARTICULAR; INTRALESIONAL; INTRAMUSCULAR; SOFT TISSUE at 13:00

## 2019-01-01 RX ADMIN — CLONIDINE HYDROCHLORIDE 0.2 MG: 0.1 TABLET ORAL at 21:11

## 2019-01-01 RX ADMIN — CLONIDINE HYDROCHLORIDE 0.1 MG: 0.1 TABLET ORAL at 21:24

## 2019-01-01 RX ADMIN — LORAZEPAM 1 MG: 2 INJECTION INTRAMUSCULAR; INTRAVENOUS at 00:24

## 2019-01-01 RX ADMIN — DABIGATRAN ETEXILATE MESYLATE 150 MG: 150 CAPSULE ORAL at 09:43

## 2019-01-01 RX ADMIN — LABETALOL HYDROCHLORIDE 10 MG: 5 INJECTION, SOLUTION INTRAVENOUS at 10:21

## 2019-01-01 RX ADMIN — LISINOPRIL 20 MG: 20 TABLET ORAL at 08:28

## 2019-01-01 RX ADMIN — HYDRALAZINE HYDROCHLORIDE 10 MG: 20 INJECTION INTRAMUSCULAR; INTRAVENOUS at 14:59

## 2019-01-01 RX ADMIN — CLONIDINE HYDROCHLORIDE 0.2 MG: 0.1 TABLET ORAL at 08:30

## 2019-01-01 RX ADMIN — PANTOPRAZOLE SODIUM 40 MG: 40 TABLET, DELAYED RELEASE ORAL at 05:32

## 2019-01-01 RX ADMIN — CLONIDINE HYDROCHLORIDE 0.2 MG: 0.1 TABLET ORAL at 20:49

## 2019-01-01 RX ADMIN — CLONIDINE HYDROCHLORIDE 0.2 MG: 0.1 TABLET ORAL at 20:39

## 2019-01-01 RX ADMIN — Medication 10 ML: at 08:25

## 2019-01-01 RX ADMIN — BISACODYL 10 MG: 10 SUPPOSITORY RECTAL at 16:30

## 2019-01-01 RX ADMIN — METFORMIN HYDROCHLORIDE 1000 MG: 500 TABLET ORAL at 17:40

## 2019-01-01 RX ADMIN — FUROSEMIDE 20 MG: 20 TABLET ORAL at 08:17

## 2019-01-01 RX ADMIN — THIAMINE HCL (VITAMIN B1) 100 MG TABLET 100 MG: at 07:43

## 2019-01-01 RX ADMIN — CARVEDILOL 37.5 MG: 25 TABLET, FILM COATED ORAL at 20:39

## 2019-01-01 RX ADMIN — CLONIDINE HYDROCHLORIDE 0.2 MG: 0.1 TABLET ORAL at 20:25

## 2019-01-01 RX ADMIN — CARVEDILOL 37.5 MG: 25 TABLET, FILM COATED ORAL at 08:28

## 2019-01-01 RX ADMIN — SODIUM CHLORIDE: 9 INJECTION, SOLUTION INTRAVENOUS at 01:02

## 2019-01-01 RX ADMIN — CARVEDILOL 25 MG: 25 TABLET, FILM COATED ORAL at 20:31

## 2019-01-01 RX ADMIN — LISINOPRIL 20 MG: 20 TABLET ORAL at 07:34

## 2019-01-01 RX ADMIN — SENNOSIDES, DOCUSATE SODIUM 2 TABLET: 50; 8.6 TABLET, FILM COATED ORAL at 07:56

## 2019-01-01 RX ADMIN — CARVEDILOL 37.5 MG: 25 TABLET, FILM COATED ORAL at 07:35

## 2019-01-01 RX ADMIN — LISINOPRIL 20 MG: 20 TABLET ORAL at 07:53

## 2019-01-01 RX ADMIN — THIAMINE HYDROCHLORIDE 100 MG: 100 INJECTION, SOLUTION INTRAMUSCULAR; INTRAVENOUS at 15:30

## 2019-01-01 RX ADMIN — FOLIC ACID 1 MG: 1 TABLET ORAL at 09:32

## 2019-01-01 RX ADMIN — CARVEDILOL 25 MG: 25 TABLET, FILM COATED ORAL at 12:57

## 2019-01-01 RX ADMIN — CARVEDILOL 37.5 MG: 25 TABLET, FILM COATED ORAL at 21:11

## 2019-01-01 RX ADMIN — LISINOPRIL 5 MG: 5 TABLET ORAL at 08:01

## 2019-01-01 RX ADMIN — GUAIFENESIN AND DEXTROMETHORPHAN 5 ML: 100; 10 SYRUP ORAL at 01:21

## 2019-01-01 RX ADMIN — CLONIDINE HYDROCHLORIDE 0.2 MG: 0.1 TABLET ORAL at 21:08

## 2019-01-01 RX ADMIN — CARVEDILOL 37.5 MG: 25 TABLET, FILM COATED ORAL at 19:59

## 2019-01-01 RX ADMIN — FOLIC ACID 1 MG: 1 TABLET ORAL at 07:53

## 2019-01-01 RX ADMIN — CLONIDINE HYDROCHLORIDE 0.2 MG: 0.1 TABLET ORAL at 07:42

## 2019-01-01 RX ADMIN — ONDANSETRON HYDROCHLORIDE 4 MG: 4 TABLET, FILM COATED ORAL at 11:20

## 2019-01-01 RX ADMIN — DOXYCYCLINE HYCLATE 100 MG: 100 TABLET, COATED ORAL at 21:24

## 2019-01-01 RX ADMIN — AMLODIPINE BESYLATE 5 MG: 5 TABLET ORAL at 16:13

## 2019-01-01 RX ADMIN — FUROSEMIDE 40 MG: 40 TABLET ORAL at 07:43

## 2019-01-01 RX ADMIN — ONDANSETRON 4 MG: 2 INJECTION INTRAMUSCULAR; INTRAVENOUS at 08:17

## 2019-01-01 RX ADMIN — SODIUM CHLORIDE, PRESERVATIVE FREE 10 ML: 5 INJECTION INTRAVENOUS at 22:06

## 2019-01-01 RX ADMIN — LISINOPRIL 5 MG: 5 TABLET ORAL at 08:17

## 2019-01-01 RX ADMIN — DOXYCYCLINE HYCLATE 100 MG: 100 TABLET, COATED ORAL at 08:26

## 2019-01-01 RX ADMIN — SODIUM BICARBONATE 50 MEQ: 84 INJECTION INTRAVENOUS at 20:32

## 2019-01-01 RX ADMIN — SODIUM CHLORIDE 500 ML: 9 INJECTION, SOLUTION INTRAVENOUS at 19:21

## 2019-01-01 RX ADMIN — INSULIN LISPRO 1 UNITS: 100 INJECTION, SOLUTION INTRAVENOUS; SUBCUTANEOUS at 17:22

## 2019-01-01 RX ADMIN — CLONIDINE HYDROCHLORIDE 0.2 MG: 0.1 TABLET ORAL at 14:06

## 2019-01-01 RX ADMIN — DOXYCYCLINE HYCLATE 100 MG: 100 TABLET, COATED ORAL at 08:01

## 2019-01-01 RX ADMIN — LISINOPRIL 5 MG: 5 TABLET ORAL at 08:04

## 2019-01-01 RX ADMIN — FUROSEMIDE 20 MG: 20 TABLET ORAL at 10:22

## 2019-01-01 RX ADMIN — DABIGATRAN ETEXILATE MESYLATE 150 MG: 150 CAPSULE ORAL at 20:01

## 2019-01-01 RX ADMIN — DABIGATRAN ETEXILATE MESYLATE 150 MG: 150 CAPSULE ORAL at 21:08

## 2019-01-01 RX ADMIN — INSULIN LISPRO 2 UNITS: 100 INJECTION, SOLUTION INTRAVENOUS; SUBCUTANEOUS at 22:07

## 2019-01-01 RX ADMIN — ONDANSETRON 4 MG: 2 INJECTION INTRAMUSCULAR; INTRAVENOUS at 09:32

## 2019-01-01 RX ADMIN — SENNOSIDES, DOCUSATE SODIUM 2 TABLET: 50; 8.6 TABLET, FILM COATED ORAL at 09:20

## 2019-01-01 RX ADMIN — INSULIN LISPRO 4 UNITS: 100 INJECTION, SOLUTION INTRAVENOUS; SUBCUTANEOUS at 17:19

## 2019-01-01 RX ADMIN — DABIGATRAN ETEXILATE MESYLATE 150 MG: 150 CAPSULE ORAL at 07:50

## 2019-01-01 RX ADMIN — ONDANSETRON HYDROCHLORIDE 4 MG: 4 TABLET, FILM COATED ORAL at 08:31

## 2019-01-01 RX ADMIN — AMLODIPINE BESYLATE 5 MG: 5 TABLET ORAL at 07:56

## 2019-01-01 RX ADMIN — INSULIN LISPRO 2 UNITS: 100 INJECTION, SOLUTION INTRAVENOUS; SUBCUTANEOUS at 17:29

## 2019-01-01 RX ADMIN — LISINOPRIL 20 MG: 20 TABLET ORAL at 09:39

## 2019-01-01 RX ADMIN — CARVEDILOL 12.5 MG: 25 TABLET, FILM COATED ORAL at 07:33

## 2019-01-01 RX ADMIN — CLONIDINE HYDROCHLORIDE 0.2 MG: 0.1 TABLET ORAL at 14:21

## 2019-01-01 RX ADMIN — ANTACID TABLETS 500 MG: 500 TABLET, CHEWABLE ORAL at 12:51

## 2019-01-01 RX ADMIN — FUROSEMIDE 40 MG: 10 INJECTION, SOLUTION INTRAMUSCULAR; INTRAVENOUS at 12:42

## 2019-01-01 RX ADMIN — THIAMINE HYDROCHLORIDE 100 MG: 100 INJECTION, SOLUTION INTRAMUSCULAR; INTRAVENOUS at 09:35

## 2019-01-01 RX ADMIN — GUAIFENESIN AND DEXTROMETHORPHAN 5 ML: 100; 10 SYRUP ORAL at 09:09

## 2019-01-01 RX ADMIN — CLONIDINE HYDROCHLORIDE 0.2 MG: 0.1 TABLET ORAL at 12:32

## 2019-01-01 RX ADMIN — CLONIDINE HYDROCHLORIDE 0.2 MG: 0.1 TABLET ORAL at 14:25

## 2019-01-01 RX ADMIN — SIMVASTATIN 20 MG: 20 TABLET, FILM COATED ORAL at 20:00

## 2019-01-01 RX ADMIN — FUROSEMIDE 40 MG: 40 TABLET ORAL at 07:21

## 2019-01-01 RX ADMIN — INSULIN LISPRO 3 UNITS: 100 INJECTION, SOLUTION INTRAVENOUS; SUBCUTANEOUS at 13:13

## 2019-01-01 RX ADMIN — SODIUM CHLORIDE, PRESERVATIVE FREE 10 ML: 5 INJECTION INTRAVENOUS at 09:42

## 2019-01-01 RX ADMIN — INSULIN LISPRO 3 UNITS: 100 INJECTION, SOLUTION INTRAVENOUS; SUBCUTANEOUS at 12:19

## 2019-01-01 RX ADMIN — FOLIC ACID 1 MG: 1 TABLET ORAL at 07:56

## 2019-01-01 RX ADMIN — CLONIDINE HYDROCHLORIDE 0.2 MG: 0.1 TABLET ORAL at 09:19

## 2019-01-01 RX ADMIN — SIMVASTATIN 20 MG: 20 TABLET, FILM COATED ORAL at 20:42

## 2019-01-01 RX ADMIN — DABIGATRAN ETEXILATE MESYLATE 150 MG: 150 CAPSULE ORAL at 21:34

## 2019-01-01 RX ADMIN — METFORMIN HYDROCHLORIDE 1000 MG: 500 TABLET ORAL at 07:43

## 2019-01-01 RX ADMIN — DABIGATRAN ETEXILATE MESYLATE 150 MG: 150 CAPSULE ORAL at 08:43

## 2019-01-01 RX ADMIN — ACETAMINOPHEN 650 MG: 325 TABLET ORAL at 14:51

## 2019-01-01 RX ADMIN — LISINOPRIL 20 MG: 20 TABLET ORAL at 07:43

## 2019-01-01 RX ADMIN — MELATONIN 3 MG: at 23:43

## 2019-01-01 RX ADMIN — Medication 10 ML: at 20:01

## 2019-01-01 RX ADMIN — AMLODIPINE BESYLATE 5 MG: 5 TABLET ORAL at 07:53

## 2019-01-01 RX ADMIN — CARVEDILOL 37.5 MG: 25 TABLET, FILM COATED ORAL at 22:25

## 2019-01-01 RX ADMIN — SIMVASTATIN 20 MG: 20 TABLET, FILM COATED ORAL at 20:52

## 2019-01-01 RX ADMIN — INSULIN LISPRO 10 UNITS: 100 INJECTION, SOLUTION INTRAVENOUS; SUBCUTANEOUS at 14:20

## 2019-01-01 RX ADMIN — HYDRALAZINE HYDROCHLORIDE 10 MG: 20 INJECTION INTRAMUSCULAR; INTRAVENOUS at 05:24

## 2019-01-01 RX ADMIN — EPINEPHRINE 1 MG: 0.1 INJECTION INTRACARDIAC; INTRAVENOUS at 21:26

## 2019-01-01 RX ADMIN — CARVEDILOL 37.5 MG: 25 TABLET, FILM COATED ORAL at 09:21

## 2019-01-01 RX ADMIN — THIAMINE HCL (VITAMIN B1) 100 MG TABLET 100 MG: at 07:53

## 2019-01-01 RX ADMIN — SIMVASTATIN 20 MG: 20 TABLET, FILM COATED ORAL at 21:24

## 2019-01-01 RX ADMIN — CLONIDINE HYDROCHLORIDE 0.2 MG: 0.1 TABLET ORAL at 13:47

## 2019-01-01 RX ADMIN — INSULIN LISPRO 1 UNITS: 100 INJECTION, SOLUTION INTRAVENOUS; SUBCUTANEOUS at 17:02

## 2019-01-01 RX ADMIN — DABIGATRAN ETEXILATE MESYLATE 150 MG: 150 CAPSULE ORAL at 21:11

## 2019-01-01 RX ADMIN — PANTOPRAZOLE SODIUM 40 MG: 40 INJECTION, POWDER, FOR SOLUTION INTRAVENOUS at 13:41

## 2019-01-01 RX ADMIN — INSULIN LISPRO 3 UNITS: 100 INJECTION, SOLUTION INTRAVENOUS; SUBCUTANEOUS at 20:49

## 2019-01-01 RX ADMIN — INSULIN LISPRO 3 UNITS: 100 INJECTION, SOLUTION INTRAVENOUS; SUBCUTANEOUS at 08:45

## 2019-01-01 RX ADMIN — CARVEDILOL 37.5 MG: 25 TABLET, FILM COATED ORAL at 20:48

## 2019-01-01 RX ADMIN — CEFTRIAXONE SODIUM 2 G: 2 INJECTION, POWDER, FOR SOLUTION INTRAMUSCULAR; INTRAVENOUS at 09:09

## 2019-01-01 RX ADMIN — LORAZEPAM 1 MG: 2 INJECTION INTRAMUSCULAR; INTRAVENOUS at 00:15

## 2019-01-01 RX ADMIN — SODIUM CHLORIDE 50 ML/HR: 9 INJECTION, SOLUTION INTRAVENOUS at 20:34

## 2019-01-01 RX ADMIN — INSULIN LISPRO 9 UNITS: 100 INJECTION, SOLUTION INTRAVENOUS; SUBCUTANEOUS at 13:11

## 2019-01-01 RX ADMIN — DOXYCYCLINE HYCLATE 100 MG: 100 TABLET, COATED ORAL at 09:12

## 2019-01-01 RX ADMIN — CARVEDILOL 25 MG: 25 TABLET, FILM COATED ORAL at 21:24

## 2019-01-01 RX ADMIN — INSULIN LISPRO 3 UNITS: 100 INJECTION, SOLUTION INTRAVENOUS; SUBCUTANEOUS at 17:33

## 2019-01-01 RX ADMIN — CLONIDINE HYDROCHLORIDE 0.2 MG: 0.1 TABLET ORAL at 20:34

## 2019-01-01 RX ADMIN — Medication 10 ML: at 09:11

## 2019-01-01 RX ADMIN — CEFTRIAXONE SODIUM 2 G: 2 INJECTION, POWDER, FOR SOLUTION INTRAMUSCULAR; INTRAVENOUS at 08:36

## 2019-01-01 RX ADMIN — INSULIN LISPRO 2 UNITS: 100 INJECTION, SOLUTION INTRAVENOUS; SUBCUTANEOUS at 17:23

## 2019-01-01 RX ADMIN — DIPHENHYDRAMINE HCL 50 MG: 25 TABLET ORAL at 20:00

## 2019-01-01 RX ADMIN — CLONIDINE HYDROCHLORIDE 0.2 MG: 0.1 TABLET ORAL at 16:04

## 2019-01-01 RX ADMIN — THIAMINE HCL (VITAMIN B1) 100 MG TABLET 100 MG: at 09:21

## 2019-01-01 RX ADMIN — SODIUM CHLORIDE, PRESERVATIVE FREE 10 ML: 5 INJECTION INTRAVENOUS at 22:38

## 2019-01-01 RX ADMIN — MELATONIN 3 MG: at 20:01

## 2019-01-01 RX ADMIN — TRIAMCINOLONE ACETONIDE 80 MG: 40 INJECTION, SUSPENSION INTRA-ARTICULAR; INTRAMUSCULAR at 13:29

## 2019-01-01 RX ADMIN — DOXYCYCLINE HYCLATE 100 MG: 100 TABLET, COATED ORAL at 08:34

## 2019-01-01 RX ADMIN — DABIGATRAN ETEXILATE MESYLATE 150 MG: 150 CAPSULE ORAL at 21:00

## 2019-01-01 RX ADMIN — METFORMIN HYDROCHLORIDE 1000 MG: 500 TABLET ORAL at 07:56

## 2019-01-01 RX ADMIN — DABIGATRAN ETEXILATE MESYLATE 150 MG: 150 CAPSULE ORAL at 21:37

## 2019-01-01 RX ADMIN — FUROSEMIDE 40 MG: 40 TABLET ORAL at 07:35

## 2019-01-01 RX ADMIN — DOXYCYCLINE HYCLATE 100 MG: 100 TABLET, COATED ORAL at 21:11

## 2019-01-01 RX ADMIN — ONDANSETRON 4 MG: 2 INJECTION INTRAMUSCULAR; INTRAVENOUS at 12:45

## 2019-01-01 RX ADMIN — SODIUM CHLORIDE, PRESERVATIVE FREE 10 ML: 5 INJECTION INTRAVENOUS at 08:10

## 2019-01-01 RX ADMIN — LISINOPRIL 5 MG: 5 TABLET ORAL at 08:34

## 2019-01-01 RX ADMIN — SODIUM CHLORIDE: 9 INJECTION, SOLUTION INTRAVENOUS at 09:08

## 2019-01-01 RX ADMIN — INSULIN LISPRO 1 UNITS: 100 INJECTION, SOLUTION INTRAVENOUS; SUBCUTANEOUS at 08:21

## 2019-01-01 RX ADMIN — FOLIC ACID 1 MG: 1 TABLET ORAL at 07:36

## 2019-01-01 RX ADMIN — CARVEDILOL 37.5 MG: 25 TABLET, FILM COATED ORAL at 20:34

## 2019-01-01 RX ADMIN — THIAMINE HYDROCHLORIDE 100 MG: 100 INJECTION, SOLUTION INTRAMUSCULAR; INTRAVENOUS at 09:13

## 2019-01-01 RX ADMIN — DABIGATRAN ETEXILATE MESYLATE 150 MG: 150 CAPSULE ORAL at 21:22

## 2019-01-01 RX ADMIN — LISINOPRIL 20 MG: 20 TABLET ORAL at 07:21

## 2019-01-01 RX ADMIN — CARVEDILOL 25 MG: 25 TABLET, FILM COATED ORAL at 08:00

## 2019-01-01 RX ADMIN — CLONIDINE HYDROCHLORIDE 0.2 MG: 0.1 TABLET ORAL at 12:05

## 2019-01-01 RX ADMIN — LISINOPRIL 5 MG: 5 TABLET ORAL at 10:11

## 2019-01-01 RX ADMIN — GUAIFENESIN AND DEXTROMETHORPHAN 5 ML: 100; 10 SYRUP ORAL at 09:43

## 2019-01-01 RX ADMIN — METFORMIN HYDROCHLORIDE 1000 MG: 500 TABLET ORAL at 17:33

## 2019-01-01 RX ADMIN — Medication 10 ML: at 11:47

## 2019-01-01 RX ADMIN — PREDNISONE 40 MG: 20 TABLET ORAL at 09:10

## 2019-01-01 RX ADMIN — INSULIN LISPRO 1 UNITS: 100 INJECTION, SOLUTION INTRAVENOUS; SUBCUTANEOUS at 22:00

## 2019-01-01 RX ADMIN — LISINOPRIL 20 MG: 20 TABLET ORAL at 07:56

## 2019-01-01 RX ADMIN — CLONIDINE HYDROCHLORIDE 0.2 MG: 0.1 TABLET ORAL at 12:57

## 2019-01-01 RX ADMIN — DOXYCYCLINE HYCLATE 100 MG: 100 TABLET, COATED ORAL at 09:41

## 2019-01-01 RX ADMIN — CLONIDINE HYDROCHLORIDE 0.2 MG: 0.1 TABLET ORAL at 14:44

## 2019-01-01 RX ADMIN — SODIUM CHLORIDE: 9 INJECTION, SOLUTION INTRAVENOUS at 17:27

## 2019-01-01 RX ADMIN — PANTOPRAZOLE SODIUM 40 MG: 40 TABLET, DELAYED RELEASE ORAL at 06:07

## 2019-01-01 RX ADMIN — METHYLPREDNISOLONE ACETATE 80 MG: 40 INJECTION, SUSPENSION INTRA-ARTICULAR; INTRALESIONAL; INTRAMUSCULAR; SOFT TISSUE at 10:55

## 2019-01-01 RX ADMIN — CARVEDILOL 37.5 MG: 25 TABLET, FILM COATED ORAL at 09:32

## 2019-01-01 RX ADMIN — CEFEPIME HYDROCHLORIDE 2 G: 2 INJECTION, POWDER, FOR SOLUTION INTRAVENOUS at 12:49

## 2019-01-01 RX ADMIN — ATROPINE SULFATE 1 MG: 1 INJECTION, SOLUTION INTRAMUSCULAR; INTRAVENOUS; SUBCUTANEOUS at 20:25

## 2019-01-01 RX ADMIN — SIMVASTATIN 20 MG: 20 TABLET, FILM COATED ORAL at 20:47

## 2019-01-01 RX ADMIN — DOXYCYCLINE HYCLATE 100 MG: 100 TABLET, COATED ORAL at 20:31

## 2019-01-01 RX ADMIN — SIMVASTATIN 20 MG: 20 TABLET, FILM COATED ORAL at 21:59

## 2019-01-01 RX ADMIN — CLONIDINE HYDROCHLORIDE 0.2 MG: 0.1 TABLET ORAL at 13:43

## 2019-01-01 RX ADMIN — DIPHENHYDRAMINE HCL 50 MG: 25 TABLET ORAL at 19:29

## 2019-01-01 RX ADMIN — DABIGATRAN ETEXILATE MESYLATE 150 MG: 150 CAPSULE ORAL at 21:01

## 2019-01-01 RX ADMIN — IOPAMIDOL 75 ML: 755 INJECTION, SOLUTION INTRAVENOUS at 06:53

## 2019-01-01 RX ADMIN — LISINOPRIL 20 MG: 20 TABLET ORAL at 09:21

## 2019-01-01 RX ADMIN — SODIUM CHLORIDE, PRESERVATIVE FREE 10 ML: 5 INJECTION INTRAVENOUS at 20:54

## 2019-01-01 RX ADMIN — FOLIC ACID 1 MG: 1 TABLET ORAL at 08:30

## 2019-01-01 RX ADMIN — METHYLPREDNISOLONE ACETATE 80 MG: 40 INJECTION, SUSPENSION INTRA-ARTICULAR; INTRALESIONAL; INTRAMUSCULAR; SOFT TISSUE at 14:40

## 2019-01-01 RX ADMIN — LORAZEPAM 1 MG: 1 TABLET ORAL at 00:49

## 2019-01-01 RX ADMIN — CLONIDINE HYDROCHLORIDE 0.2 MG: 0.1 TABLET ORAL at 13:25

## 2019-01-01 RX ADMIN — SIMVASTATIN 20 MG: 20 TABLET, FILM COATED ORAL at 20:01

## 2019-01-01 RX ADMIN — INSULIN LISPRO 1 UNITS: 100 INJECTION, SOLUTION INTRAVENOUS; SUBCUTANEOUS at 08:01

## 2019-01-01 RX ADMIN — LORAZEPAM 1 MG: 1 TABLET ORAL at 09:32

## 2019-01-01 RX ADMIN — SODIUM CHLORIDE, PRESERVATIVE FREE 10 ML: 5 INJECTION INTRAVENOUS at 21:15

## 2019-01-01 RX ADMIN — METFORMIN HYDROCHLORIDE 1000 MG: 500 TABLET ORAL at 07:20

## 2019-01-01 RX ADMIN — FOLIC ACID 1 MG: 1 TABLET ORAL at 09:19

## 2019-01-01 RX ADMIN — DABIGATRAN ETEXILATE MESYLATE 150 MG: 150 CAPSULE ORAL at 21:26

## 2019-01-01 RX ADMIN — INSULIN LISPRO 3 UNITS: 100 INJECTION, SOLUTION INTRAVENOUS; SUBCUTANEOUS at 17:13

## 2019-01-01 RX ADMIN — CARVEDILOL 37.5 MG: 25 TABLET, FILM COATED ORAL at 21:59

## 2019-01-01 RX ADMIN — CARVEDILOL 37.5 MG: 25 TABLET, FILM COATED ORAL at 07:43

## 2019-01-01 RX ADMIN — PROMETHAZINE HYDROCHLORIDE 12.5 MG: 25 INJECTION INTRAMUSCULAR; INTRAVENOUS at 12:20

## 2019-01-01 RX ADMIN — SODIUM CHLORIDE 2352 ML: 9 INJECTION, SOLUTION INTRAVENOUS at 10:38

## 2019-01-01 RX ADMIN — INSULIN LISPRO 3 UNITS: 100 INJECTION, SOLUTION INTRAVENOUS; SUBCUTANEOUS at 20:24

## 2019-01-01 RX ADMIN — SODIUM CHLORIDE, PRESERVATIVE FREE 10 ML: 5 INJECTION INTRAVENOUS at 21:24

## 2019-01-01 RX ADMIN — INSULIN LISPRO 2 UNITS: 100 INJECTION, SOLUTION INTRAVENOUS; SUBCUTANEOUS at 21:21

## 2019-01-01 RX ADMIN — INSULIN LISPRO 2 UNITS: 100 INJECTION, SOLUTION INTRAVENOUS; SUBCUTANEOUS at 21:10

## 2019-01-01 RX ADMIN — CARVEDILOL 37.5 MG: 25 TABLET, FILM COATED ORAL at 20:25

## 2019-01-01 RX ADMIN — LABETALOL HYDROCHLORIDE 10 MG: 5 INJECTION, SOLUTION INTRAVENOUS at 10:22

## 2019-01-01 RX ADMIN — SIMVASTATIN 20 MG: 20 TABLET, FILM COATED ORAL at 22:37

## 2019-01-01 RX ADMIN — INSULIN LISPRO 10 UNITS: 100 INJECTION, SOLUTION INTRAVENOUS; SUBCUTANEOUS at 12:07

## 2019-01-01 RX ADMIN — CEFTRIAXONE SODIUM 2 G: 2 INJECTION, POWDER, FOR SOLUTION INTRAMUSCULAR; INTRAVENOUS at 09:25

## 2019-01-01 RX ADMIN — BUPIVACAINE HYDROCHLORIDE 20 MG: 5 INJECTION, SOLUTION EPIDURAL; INTRACAUDAL at 13:27

## 2019-01-01 RX ADMIN — INSULIN LISPRO 9 UNITS: 100 INJECTION, SOLUTION INTRAVENOUS; SUBCUTANEOUS at 13:43

## 2019-01-01 RX ADMIN — CEFTRIAXONE SODIUM 2 G: 2 INJECTION, POWDER, FOR SOLUTION INTRAMUSCULAR; INTRAVENOUS at 08:09

## 2019-01-01 RX ADMIN — CEFTRIAXONE 1 G: 1 INJECTION, POWDER, FOR SOLUTION INTRAMUSCULAR; INTRAVENOUS at 08:25

## 2019-01-01 RX ADMIN — DABIGATRAN ETEXILATE MESYLATE 150 MG: 150 CAPSULE ORAL at 07:35

## 2019-01-01 RX ADMIN — FOLIC ACID 1 MG: 1 TABLET ORAL at 09:09

## 2019-01-01 RX ADMIN — METFORMIN HYDROCHLORIDE 1000 MG: 500 TABLET ORAL at 16:57

## 2019-01-01 RX ADMIN — CARVEDILOL 37.5 MG: 25 TABLET, FILM COATED ORAL at 07:28

## 2019-01-01 RX ADMIN — LORAZEPAM 1 MG: 1 TABLET ORAL at 20:31

## 2019-01-01 RX ADMIN — MELATONIN 3 MG: at 21:57

## 2019-01-01 RX ADMIN — SIMVASTATIN 20 MG: 20 TABLET, FILM COATED ORAL at 21:35

## 2019-01-01 RX ADMIN — CLONIDINE HYDROCHLORIDE 0.2 MG: 0.1 TABLET ORAL at 08:28

## 2019-01-01 RX ADMIN — CLONIDINE HYDROCHLORIDE 0.2 MG: 0.1 TABLET ORAL at 07:53

## 2019-01-01 RX ADMIN — FOLIC ACID 1 MG: 1 TABLET ORAL at 15:29

## 2019-01-01 RX ADMIN — SODIUM CHLORIDE: 9 INJECTION, SOLUTION INTRAVENOUS at 01:07

## 2019-01-01 RX ADMIN — LORAZEPAM 2 MG: 2 INJECTION INTRAMUSCULAR; INTRAVENOUS at 18:17

## 2019-01-01 RX ADMIN — CLONIDINE HYDROCHLORIDE 0.2 MG: 0.1 TABLET ORAL at 07:35

## 2019-01-01 RX ADMIN — DABIGATRAN ETEXILATE MESYLATE 150 MG: 150 CAPSULE ORAL at 22:06

## 2019-01-01 RX ADMIN — Medication 100 MG: at 12:42

## 2019-01-01 RX ADMIN — AMLODIPINE BESYLATE 5 MG: 5 TABLET ORAL at 08:28

## 2019-01-01 RX ADMIN — CARVEDILOL 37.5 MG: 25 TABLET, FILM COATED ORAL at 21:15

## 2019-01-01 RX ADMIN — CLONIDINE HYDROCHLORIDE 0.1 MG: 0.1 TABLET ORAL at 13:03

## 2019-01-01 RX ADMIN — FOLIC ACID 1 MG: 1 TABLET ORAL at 07:35

## 2019-01-01 RX ADMIN — CARVEDILOL 37.5 MG: 25 TABLET, FILM COATED ORAL at 21:07

## 2019-01-01 RX ADMIN — CLONIDINE HYDROCHLORIDE 0.2 MG: 0.1 TABLET ORAL at 14:00

## 2019-01-01 RX ADMIN — INSULIN LISPRO 1 UNITS: 100 INJECTION, SOLUTION INTRAVENOUS; SUBCUTANEOUS at 12:14

## 2019-01-01 RX ADMIN — PANTOPRAZOLE SODIUM 40 MG: 40 INJECTION, POWDER, FOR SOLUTION INTRAVENOUS at 11:45

## 2019-01-01 RX ADMIN — CARVEDILOL 37.5 MG: 25 TABLET, FILM COATED ORAL at 20:56

## 2019-01-01 RX ADMIN — INSULIN LISPRO 2 UNITS: 100 INJECTION, SOLUTION INTRAVENOUS; SUBCUTANEOUS at 20:59

## 2019-01-01 RX ADMIN — METFORMIN HYDROCHLORIDE 1000 MG: 500 TABLET ORAL at 17:13

## 2019-01-01 RX ADMIN — SODIUM CHLORIDE, PRESERVATIVE FREE 10 ML: 5 INJECTION INTRAVENOUS at 20:36

## 2019-01-01 RX ADMIN — THIAMINE HCL (VITAMIN B1) 100 MG TABLET 100 MG: at 07:21

## 2019-01-01 RX ADMIN — INSULIN LISPRO 1 UNITS: 100 INJECTION, SOLUTION INTRAVENOUS; SUBCUTANEOUS at 20:08

## 2019-01-01 RX ADMIN — DABIGATRAN ETEXILATE MESYLATE 150 MG: 150 CAPSULE ORAL at 08:32

## 2019-01-01 RX ADMIN — PROMETHAZINE HYDROCHLORIDE 12.5 MG: 25 INJECTION INTRAMUSCULAR; INTRAVENOUS at 17:34

## 2019-01-01 RX ADMIN — INSULIN LISPRO 1 UNITS: 100 INJECTION, SOLUTION INTRAVENOUS; SUBCUTANEOUS at 20:32

## 2019-01-01 RX ADMIN — FOLIC ACID 1 MG: 1 TABLET ORAL at 08:17

## 2019-01-01 RX ADMIN — CARVEDILOL 37.5 MG: 25 TABLET, FILM COATED ORAL at 09:19

## 2019-01-01 RX ADMIN — FUROSEMIDE 40 MG: 40 TABLET ORAL at 20:32

## 2019-01-01 RX ADMIN — LORAZEPAM 2 MG: 2 INJECTION INTRAMUSCULAR; INTRAVENOUS at 03:47

## 2019-01-01 RX ADMIN — METFORMIN HYDROCHLORIDE 1000 MG: 500 TABLET ORAL at 17:21

## 2019-01-01 RX ADMIN — ACETAMINOPHEN 650 MG: 325 TABLET ORAL at 14:44

## 2019-01-01 RX ADMIN — METFORMIN HYDROCHLORIDE 1000 MG: 500 TABLET ORAL at 08:30

## 2019-01-01 RX ADMIN — CLONIDINE HYDROCHLORIDE 0.2 MG: 0.1 TABLET ORAL at 09:10

## 2019-01-01 RX ADMIN — INSULIN LISPRO 2 UNITS: 100 INJECTION, SOLUTION INTRAVENOUS; SUBCUTANEOUS at 12:43

## 2019-01-01 RX ADMIN — METFORMIN HYDROCHLORIDE 1000 MG: 500 TABLET ORAL at 09:20

## 2019-01-01 RX ADMIN — ACETAMINOPHEN 650 MG: 325 TABLET ORAL at 17:22

## 2019-01-01 RX ADMIN — LORAZEPAM 2 MG: 1 TABLET ORAL at 16:13

## 2019-01-01 RX ADMIN — SIMVASTATIN 20 MG: 20 TABLET, FILM COATED ORAL at 21:15

## 2019-01-01 RX ADMIN — PREDNISONE 40 MG: 20 TABLET ORAL at 08:28

## 2019-01-01 RX ADMIN — Medication 10 ML: at 13:41

## 2019-01-01 RX ADMIN — LABETALOL HYDROCHLORIDE 10 MG: 5 INJECTION, SOLUTION INTRAVENOUS at 17:52

## 2019-01-01 RX ADMIN — INSULIN LISPRO 1 UNITS: 100 INJECTION, SOLUTION INTRAVENOUS; SUBCUTANEOUS at 17:06

## 2019-01-01 RX ADMIN — PANTOPRAZOLE SODIUM 40 MG: 40 TABLET, DELAYED RELEASE ORAL at 05:23

## 2019-01-01 RX ADMIN — FOLIC ACID 1 MG: 1 TABLET ORAL at 08:29

## 2019-01-01 RX ADMIN — SODIUM CHLORIDE, PRESERVATIVE FREE 10 ML: 5 INJECTION INTRAVENOUS at 08:17

## 2019-01-01 RX ADMIN — GUAIFENESIN AND DEXTROMETHORPHAN 5 ML: 100; 10 SYRUP ORAL at 17:22

## 2019-01-01 RX ADMIN — SODIUM CHLORIDE: 9 INJECTION, SOLUTION INTRAVENOUS at 17:49

## 2019-01-01 RX ADMIN — SODIUM CHLORIDE, PRESERVATIVE FREE 10 ML: 5 INJECTION INTRAVENOUS at 09:33

## 2019-01-01 RX ADMIN — SODIUM CHLORIDE, PRESERVATIVE FREE 10 ML: 5 INJECTION INTRAVENOUS at 08:28

## 2019-01-01 RX ADMIN — FUROSEMIDE 20 MG: 20 TABLET ORAL at 08:01

## 2019-01-01 RX ADMIN — SIMVASTATIN 20 MG: 20 TABLET, FILM COATED ORAL at 21:11

## 2019-01-01 RX ADMIN — ACETAMINOPHEN 650 MG: 325 TABLET ORAL at 15:48

## 2019-01-01 RX ADMIN — FUROSEMIDE 40 MG: 40 TABLET ORAL at 09:32

## 2019-01-01 RX ADMIN — SODIUM BICARBONATE 50 MEQ: 84 INJECTION INTRAVENOUS at 20:26

## 2019-01-01 RX ADMIN — FUROSEMIDE 20 MG: 20 TABLET ORAL at 08:34

## 2019-01-01 RX ADMIN — INSULIN LISPRO 1 UNITS: 100 INJECTION, SOLUTION INTRAVENOUS; SUBCUTANEOUS at 12:08

## 2019-01-01 RX ADMIN — SENNOSIDES, DOCUSATE SODIUM 2 TABLET: 50; 8.6 TABLET, FILM COATED ORAL at 20:34

## 2019-01-01 RX ADMIN — LISINOPRIL 20 MG: 20 TABLET ORAL at 07:27

## 2019-01-01 RX ADMIN — INSULIN LISPRO 1 UNITS: 100 INJECTION, SOLUTION INTRAVENOUS; SUBCUTANEOUS at 21:35

## 2019-01-01 RX ADMIN — THIAMINE HYDROCHLORIDE 100 MG: 100 INJECTION, SOLUTION INTRAMUSCULAR; INTRAVENOUS at 09:32

## 2019-01-01 RX ADMIN — CLONIDINE HYDROCHLORIDE 0.2 MG: 0.1 TABLET ORAL at 21:20

## 2019-01-01 RX ADMIN — INSULIN LISPRO 1 UNITS: 100 INJECTION, SOLUTION INTRAVENOUS; SUBCUTANEOUS at 09:12

## 2019-01-01 RX ADMIN — POLYETHYLENE GLYCOL 3350 17 G: 17 POWDER, FOR SOLUTION ORAL at 07:46

## 2019-01-01 RX ADMIN — FUROSEMIDE 40 MG: 40 TABLET ORAL at 07:53

## 2019-01-01 RX ADMIN — DOXYCYCLINE HYCLATE 100 MG: 100 TABLET, COATED ORAL at 20:52

## 2019-01-01 RX ADMIN — HYDRALAZINE HYDROCHLORIDE 10 MG: 20 INJECTION INTRAMUSCULAR; INTRAVENOUS at 11:07

## 2019-01-01 RX ADMIN — DIPHENHYDRAMINE HCL 50 MG: 25 TABLET ORAL at 21:15

## 2019-01-01 RX ADMIN — SENNOSIDES, DOCUSATE SODIUM 2 TABLET: 50; 8.6 TABLET, FILM COATED ORAL at 07:44

## 2019-01-01 RX ADMIN — AMLODIPINE BESYLATE 5 MG: 5 TABLET ORAL at 09:11

## 2019-01-01 RX ADMIN — INSULIN LISPRO 10 UNITS: 100 INJECTION, SOLUTION INTRAVENOUS; SUBCUTANEOUS at 11:43

## 2019-01-01 RX ADMIN — CEFTRIAXONE SODIUM 2 G: 2 INJECTION, POWDER, FOR SOLUTION INTRAMUSCULAR; INTRAVENOUS at 08:17

## 2019-01-01 RX ADMIN — POLYETHYLENE GLYCOL 3350 17 G: 17 POWDER, FOR SOLUTION ORAL at 07:55

## 2019-01-01 RX ADMIN — THIAMINE HYDROCHLORIDE 100 MG: 100 INJECTION, SOLUTION INTRAMUSCULAR; INTRAVENOUS at 09:00

## 2019-01-01 RX ADMIN — CARVEDILOL 37.5 MG: 25 TABLET, FILM COATED ORAL at 21:20

## 2019-01-01 RX ADMIN — CARVEDILOL 37.5 MG: 25 TABLET, FILM COATED ORAL at 07:20

## 2019-01-01 RX ADMIN — METFORMIN HYDROCHLORIDE 1000 MG: 500 TABLET ORAL at 16:58

## 2019-01-01 RX ADMIN — ETOMIDATE 20 MCG: 2 INJECTION INTRAVENOUS at 19:31

## 2019-01-01 RX ADMIN — LISINOPRIL 20 MG: 20 TABLET ORAL at 09:10

## 2019-01-01 RX ADMIN — PREDNISONE 40 MG: 20 TABLET ORAL at 16:04

## 2019-01-01 RX ADMIN — DABIGATRAN ETEXILATE MESYLATE 150 MG: 150 CAPSULE ORAL at 21:07

## 2019-01-01 RX ADMIN — AMLODIPINE BESYLATE 5 MG: 5 TABLET ORAL at 07:28

## 2019-01-01 RX ADMIN — CARVEDILOL 25 MG: 25 TABLET, FILM COATED ORAL at 08:05

## 2019-01-01 RX ADMIN — FUROSEMIDE 40 MG: 40 TABLET ORAL at 09:40

## 2019-01-01 RX ADMIN — INSULIN LISPRO 1 UNITS: 100 INJECTION, SOLUTION INTRAVENOUS; SUBCUTANEOUS at 08:23

## 2019-01-01 RX ADMIN — SIMVASTATIN 20 MG: 20 TABLET, FILM COATED ORAL at 20:39

## 2019-01-01 RX ADMIN — SIMVASTATIN 20 MG: 20 TABLET, FILM COATED ORAL at 20:49

## 2019-01-01 RX ADMIN — DABIGATRAN ETEXILATE MESYLATE 150 MG: 150 CAPSULE ORAL at 08:00

## 2019-01-01 RX ADMIN — INSULIN LISPRO 6 UNITS: 100 INJECTION, SOLUTION INTRAVENOUS; SUBCUTANEOUS at 16:57

## 2019-01-01 RX ADMIN — SIMVASTATIN 20 MG: 20 TABLET, FILM COATED ORAL at 20:31

## 2019-01-01 RX ADMIN — DABIGATRAN ETEXILATE MESYLATE 150 MG: 150 CAPSULE ORAL at 07:57

## 2019-01-01 RX ADMIN — METFORMIN HYDROCHLORIDE 1000 MG: 500 TABLET ORAL at 07:44

## 2019-01-01 RX ADMIN — VANCOMYCIN HYDROCHLORIDE 1000 MG: 1 INJECTION, POWDER, LYOPHILIZED, FOR SOLUTION INTRAVENOUS at 11:47

## 2019-01-01 RX ADMIN — SIMVASTATIN 20 MG: 20 TABLET, FILM COATED ORAL at 20:56

## 2019-01-01 RX ADMIN — DABIGATRAN ETEXILATE MESYLATE 150 MG: 150 CAPSULE ORAL at 20:36

## 2019-01-01 RX ADMIN — SIMVASTATIN 20 MG: 20 TABLET, FILM COATED ORAL at 21:20

## 2019-01-01 RX ADMIN — GUAIFENESIN AND DEXTROMETHORPHAN 5 ML: 100; 10 SYRUP ORAL at 20:36

## 2019-01-01 RX ADMIN — CARVEDILOL 25 MG: 25 TABLET, FILM COATED ORAL at 08:34

## 2019-01-01 RX ADMIN — DOXYCYCLINE HYCLATE 100 MG: 100 TABLET, COATED ORAL at 21:59

## 2019-01-01 RX ADMIN — SIMVASTATIN 20 MG: 20 TABLET, FILM COATED ORAL at 20:36

## 2019-01-01 RX ADMIN — DIPHENHYDRAMINE HCL 50 MG: 25 TABLET ORAL at 19:59

## 2019-01-01 RX ADMIN — DABIGATRAN ETEXILATE MESYLATE 150 MG: 150 CAPSULE ORAL at 09:19

## 2019-01-01 RX ADMIN — INSULIN LISPRO 12 UNITS: 100 INJECTION, SOLUTION INTRAVENOUS; SUBCUTANEOUS at 12:11

## 2019-01-01 RX ADMIN — CLONIDINE HYDROCHLORIDE 0.2 MG: 0.1 TABLET ORAL at 20:47

## 2019-01-01 RX ADMIN — CLONIDINE HYDROCHLORIDE 0.2 MG: 0.1 TABLET ORAL at 12:20

## 2019-01-01 RX ADMIN — FUROSEMIDE 40 MG: 40 TABLET ORAL at 09:11

## 2019-01-01 RX ADMIN — METOPROLOL TARTRATE 5 MG: 5 INJECTION, SOLUTION INTRAVENOUS at 15:17

## 2019-01-01 RX ADMIN — Medication 10 ML: at 20:33

## 2019-01-01 RX ADMIN — CLONIDINE HYDROCHLORIDE 0.1 MG: 0.1 TABLET ORAL at 15:29

## 2019-01-01 RX ADMIN — LISINOPRIL 20 MG: 20 TABLET ORAL at 07:57

## 2019-01-01 RX ADMIN — SODIUM CHLORIDE, PRESERVATIVE FREE 10 ML: 5 INJECTION INTRAVENOUS at 09:12

## 2019-01-01 RX ADMIN — CLONIDINE HYDROCHLORIDE 0.2 MG: 0.1 TABLET ORAL at 07:33

## 2019-01-01 RX ADMIN — INSULIN LISPRO 1 UNITS: 100 INJECTION, SOLUTION INTRAVENOUS; SUBCUTANEOUS at 16:35

## 2019-01-01 RX ADMIN — CLONIDINE HYDROCHLORIDE 0.2 MG: 0.1 TABLET ORAL at 07:57

## 2019-01-01 RX ADMIN — CLONIDINE HYDROCHLORIDE 0.2 MG: 0.1 TABLET ORAL at 09:37

## 2019-01-01 RX ADMIN — Medication 100 MG: at 09:09

## 2019-01-01 RX ADMIN — CARVEDILOL 37.5 MG: 25 TABLET, FILM COATED ORAL at 08:30

## 2019-01-01 RX ADMIN — INSULIN LISPRO 2 UNITS: 100 INJECTION, SOLUTION INTRAVENOUS; SUBCUTANEOUS at 11:22

## 2019-01-01 RX ADMIN — CARVEDILOL 25 MG: 25 TABLET, FILM COATED ORAL at 15:29

## 2019-01-01 RX ADMIN — FUROSEMIDE 40 MG: 40 TABLET ORAL at 07:33

## 2019-01-01 RX ADMIN — INSULIN LISPRO 1 UNITS: 100 INJECTION, SOLUTION INTRAVENOUS; SUBCUTANEOUS at 17:43

## 2019-01-01 RX ADMIN — INSULIN LISPRO 2 UNITS: 100 INJECTION, SOLUTION INTRAVENOUS; SUBCUTANEOUS at 21:04

## 2019-01-01 RX ADMIN — CLONIDINE HYDROCHLORIDE 0.2 MG: 0.1 TABLET ORAL at 13:37

## 2019-01-01 RX ADMIN — ACETAMINOPHEN 650 MG: 325 TABLET ORAL at 12:20

## 2019-01-01 RX ADMIN — Medication 10 ML: at 20:36

## 2019-01-01 RX ADMIN — FUROSEMIDE 40 MG: 40 TABLET ORAL at 07:56

## 2019-01-01 RX ADMIN — DABIGATRAN ETEXILATE MESYLATE 150 MG: 150 CAPSULE ORAL at 09:41

## 2019-01-01 RX ADMIN — PANTOPRAZOLE SODIUM 40 MG: 40 TABLET, DELAYED RELEASE ORAL at 05:52

## 2019-01-01 RX ADMIN — THIAMINE HYDROCHLORIDE 100 MG: 100 INJECTION, SOLUTION INTRAMUSCULAR; INTRAVENOUS at 11:18

## 2019-01-01 RX ADMIN — LORAZEPAM 1 MG: 1 TABLET ORAL at 20:42

## 2019-01-01 RX ADMIN — DOXYCYCLINE HYCLATE 100 MG: 100 TABLET, COATED ORAL at 09:34

## 2019-01-01 RX ADMIN — DABIGATRAN ETEXILATE MESYLATE 150 MG: 150 CAPSULE ORAL at 22:00

## 2019-01-01 RX ADMIN — LISINOPRIL 20 MG: 20 TABLET ORAL at 07:40

## 2019-01-01 RX ADMIN — AMLODIPINE BESYLATE 5 MG: 5 TABLET ORAL at 08:30

## 2019-01-01 RX ADMIN — INSULIN LISPRO 4 UNITS: 100 INJECTION, SOLUTION INTRAVENOUS; SUBCUTANEOUS at 17:59

## 2019-01-01 RX ADMIN — PROCHLORPERAZINE EDISYLATE 10 MG: 5 INJECTION INTRAMUSCULAR; INTRAVENOUS at 20:11

## 2019-01-01 RX ADMIN — INSULIN LISPRO 4 UNITS: 100 INJECTION, SOLUTION INTRAVENOUS; SUBCUTANEOUS at 09:23

## 2019-01-01 RX ADMIN — LORAZEPAM 0.5 MG: 2 INJECTION INTRAMUSCULAR; INTRAVENOUS at 17:07

## 2019-01-01 RX ADMIN — CLONIDINE HYDROCHLORIDE 0.1 MG: 0.1 TABLET ORAL at 14:11

## 2019-01-01 RX ADMIN — LINEZOLID 600 MG: 600 INJECTION, SOLUTION INTRAVENOUS at 18:14

## 2019-01-01 RX ADMIN — AMLODIPINE BESYLATE 5 MG: 5 TABLET ORAL at 09:20

## 2019-01-01 RX ADMIN — CARVEDILOL 37.5 MG: 25 TABLET, FILM COATED ORAL at 07:56

## 2019-01-01 RX ADMIN — DABIGATRAN ETEXILATE MESYLATE 150 MG: 150 CAPSULE ORAL at 07:56

## 2019-01-01 RX ADMIN — Medication 15 MCG/MIN: at 21:25

## 2019-01-01 RX ADMIN — CEFTRIAXONE 1 G: 1 INJECTION, POWDER, FOR SOLUTION INTRAMUSCULAR; INTRAVENOUS at 10:22

## 2019-01-01 RX ADMIN — PANTOPRAZOLE SODIUM 40 MG: 40 TABLET, DELAYED RELEASE ORAL at 08:30

## 2019-01-01 RX ADMIN — EPINEPHRINE 6 MCG/MIN: 1 INJECTION PARENTERAL at 20:44

## 2019-01-01 RX ADMIN — CLONIDINE HYDROCHLORIDE 0.1 MG: 0.1 TABLET ORAL at 08:05

## 2019-01-01 RX ADMIN — DIPHENHYDRAMINE HCL 50 MG: 25 TABLET ORAL at 21:12

## 2019-01-01 RX ADMIN — KETOROLAC TROMETHAMINE 15 MG: 15 INJECTION, SOLUTION INTRAMUSCULAR; INTRAVENOUS at 14:40

## 2019-01-01 RX ADMIN — DOXYCYCLINE HYCLATE 100 MG: 100 TABLET, COATED ORAL at 20:37

## 2019-01-01 RX ADMIN — CLONIDINE HYDROCHLORIDE 0.2 MG: 0.1 TABLET ORAL at 08:00

## 2019-01-01 RX ADMIN — LORAZEPAM 3 MG: 1 TABLET ORAL at 19:01

## 2019-01-01 RX ADMIN — INSULIN LISPRO 6 UNITS: 100 INJECTION, SOLUTION INTRAVENOUS; SUBCUTANEOUS at 17:33

## 2019-01-01 RX ADMIN — CARVEDILOL 37.5 MG: 25 TABLET, FILM COATED ORAL at 20:47

## 2019-01-01 RX ADMIN — METFORMIN HYDROCHLORIDE 1000 MG: 500 TABLET ORAL at 07:27

## 2019-01-01 RX ADMIN — CLONIDINE HYDROCHLORIDE 0.2 MG: 0.1 TABLET ORAL at 20:42

## 2019-01-01 RX ADMIN — ANORECTAL OINTMENT: 15.7; .44; 24; 20.6 OINTMENT TOPICAL at 13:04

## 2019-01-01 RX ADMIN — ANORECTAL OINTMENT: 15.7; .44; 24; 20.6 OINTMENT TOPICAL at 07:20

## 2019-01-01 RX ADMIN — CARVEDILOL 25 MG: 25 TABLET, FILM COATED ORAL at 20:42

## 2019-01-01 RX ADMIN — SODIUM BICARBONATE 50 MEQ: 84 INJECTION INTRAVENOUS at 21:29

## 2019-01-01 RX ADMIN — IPRATROPIUM BROMIDE AND ALBUTEROL SULFATE 1 AMPULE: .5; 3 SOLUTION RESPIRATORY (INHALATION) at 12:15

## 2019-01-01 RX ADMIN — DOXYCYCLINE HYCLATE 100 MG: 100 TABLET, COATED ORAL at 22:42

## 2019-01-01 RX ADMIN — LABETALOL HYDROCHLORIDE 10 MG: 5 INJECTION, SOLUTION INTRAVENOUS at 13:33

## 2019-01-01 RX ADMIN — INSULIN LISPRO 1 UNITS: 100 INJECTION, SOLUTION INTRAVENOUS; SUBCUTANEOUS at 09:24

## 2019-01-01 RX ADMIN — FOLIC ACID 1 MG: 1 TABLET ORAL at 08:28

## 2019-01-01 RX ADMIN — LISINOPRIL 20 MG: 20 TABLET ORAL at 07:35

## 2019-01-01 RX ADMIN — MELATONIN 3 MG: at 20:31

## 2019-01-01 RX ADMIN — DABIGATRAN ETEXILATE MESYLATE 150 MG: 150 CAPSULE ORAL at 23:30

## 2019-01-01 RX ADMIN — INSULIN LISPRO 1 UNITS: 100 INJECTION, SOLUTION INTRAVENOUS; SUBCUTANEOUS at 21:24

## 2019-01-01 RX ADMIN — DOXYCYCLINE HYCLATE 100 MG: 100 TABLET, COATED ORAL at 20:42

## 2019-01-01 RX ADMIN — CLONIDINE HYDROCHLORIDE 0.2 MG: 0.1 TABLET ORAL at 07:44

## 2019-01-01 RX ADMIN — CARVEDILOL 37.5 MG: 25 TABLET, FILM COATED ORAL at 09:10

## 2019-01-01 RX ADMIN — CLONIDINE HYDROCHLORIDE 0.2 MG: 0.1 TABLET ORAL at 21:15

## 2019-01-01 RX ADMIN — SIMVASTATIN 20 MG: 20 TABLET, FILM COATED ORAL at 20:34

## 2019-01-01 RX ADMIN — EPINEPHRINE 0.1 MG: 1 INJECTION, SOLUTION, CONCENTRATE INTRAVENOUS at 19:25

## 2019-01-01 RX ADMIN — DABIGATRAN ETEXILATE MESYLATE 150 MG: 150 CAPSULE ORAL at 20:00

## 2019-01-01 RX ADMIN — INSULIN LISPRO 3 UNITS: 100 INJECTION, SOLUTION INTRAVENOUS; SUBCUTANEOUS at 12:32

## 2019-01-01 RX ADMIN — INSULIN LISPRO 3 UNITS: 100 INJECTION, SOLUTION INTRAVENOUS; SUBCUTANEOUS at 12:23

## 2019-01-01 RX ADMIN — DABIGATRAN ETEXILATE MESYLATE 150 MG: 150 CAPSULE ORAL at 20:47

## 2019-01-01 RX ADMIN — AMLODIPINE BESYLATE 5 MG: 5 TABLET ORAL at 07:21

## 2019-01-01 RX ADMIN — PROCHLORPERAZINE EDISYLATE 10 MG: 5 INJECTION INTRAMUSCULAR; INTRAVENOUS at 01:07

## 2019-01-01 RX ADMIN — DABIGATRAN ETEXILATE MESYLATE 150 MG: 150 CAPSULE ORAL at 08:28

## 2019-01-01 RX ADMIN — FOLIC ACID 1 MG: 1 TABLET ORAL at 09:20

## 2019-01-01 RX ADMIN — CARVEDILOL 37.5 MG: 25 TABLET, FILM COATED ORAL at 09:39

## 2019-01-01 RX ADMIN — THIAMINE HCL (VITAMIN B1) 100 MG TABLET 100 MG: at 07:28

## 2019-01-01 RX ADMIN — PANTOPRAZOLE SODIUM 40 MG: 40 TABLET, DELAYED RELEASE ORAL at 06:12

## 2019-01-01 RX ADMIN — CEFEPIME HYDROCHLORIDE 2 G: 2 INJECTION, POWDER, FOR SOLUTION INTRAVENOUS at 12:13

## 2019-01-01 RX ADMIN — AMLODIPINE BESYLATE 5 MG: 5 TABLET ORAL at 07:35

## 2019-01-01 ASSESSMENT — PAIN SCALES - GENERAL
PAINLEVEL_OUTOF10: 0
PAINLEVEL_OUTOF10: 0
PAINLEVEL_OUTOF10: 3
PAINLEVEL_OUTOF10: 2
PAINLEVEL_OUTOF10: 3
PAINLEVEL_OUTOF10: 5
PAINLEVEL_OUTOF10: 0
PAINLEVEL_OUTOF10: 4
PAINLEVEL_OUTOF10: 0
PAINLEVEL_OUTOF10: 3
PAINLEVEL_OUTOF10: 0
PAINLEVEL_OUTOF10: 1
PAINLEVEL_OUTOF10: 0
PAINLEVEL_OUTOF10: 3
PAINLEVEL_OUTOF10: 2
PAINLEVEL_OUTOF10: 0
PAINLEVEL_OUTOF10: 3
PAINLEVEL_OUTOF10: 0
PAINLEVEL_OUTOF10: 0
PAINLEVEL_OUTOF10: 2
PAINLEVEL_OUTOF10: 0
PAINLEVEL_OUTOF10: 0
PAINLEVEL_OUTOF10: 3
PAINLEVEL_OUTOF10: 0
PAINLEVEL_OUTOF10: 0
PAINLEVEL_OUTOF10: 3
PAINLEVEL_OUTOF10: 0
PAINLEVEL_OUTOF10: 2
PAINLEVEL_OUTOF10: 0
PAINLEVEL_OUTOF10: 1
PAINLEVEL_OUTOF10: 0
PAINLEVEL_OUTOF10: 0
PAINLEVEL_OUTOF10: 4
PAINLEVEL_OUTOF10: 0
PAINLEVEL_OUTOF10: 3
PAINLEVEL_OUTOF10: 0
PAINLEVEL_OUTOF10: 3
PAINLEVEL_OUTOF10: 0
PAINLEVEL_OUTOF10: 1
PAINLEVEL_OUTOF10: 0
PAINLEVEL_OUTOF10: 4
PAINLEVEL_OUTOF10: 7
PAINLEVEL_OUTOF10: 0
PAINLEVEL_OUTOF10: 0
PAINLEVEL_OUTOF10: 3
PAINLEVEL_OUTOF10: 0
PAINLEVEL_OUTOF10: 4
PAINLEVEL_OUTOF10: 0
PAINLEVEL_OUTOF10: 3
PAINLEVEL_OUTOF10: 0

## 2019-01-01 ASSESSMENT — PAIN DESCRIPTION - ONSET
ONSET: ON-GOING
ONSET: GRADUAL
ONSET: ON-GOING

## 2019-01-01 ASSESSMENT — PAIN - FUNCTIONAL ASSESSMENT
PAIN_FUNCTIONAL_ASSESSMENT: PREVENTS OR INTERFERES SOME ACTIVE ACTIVITIES AND ADLS
PAIN_FUNCTIONAL_ASSESSMENT: ACTIVITIES ARE NOT PREVENTED
PAIN_FUNCTIONAL_ASSESSMENT: ACTIVITIES ARE NOT PREVENTED
PAIN_FUNCTIONAL_ASSESSMENT: PREVENTS OR INTERFERES WITH ALL ACTIVE AND SOME PASSIVE ACTIVITIES
PAIN_FUNCTIONAL_ASSESSMENT: PREVENTS OR INTERFERES SOME ACTIVE ACTIVITIES AND ADLS
PAIN_FUNCTIONAL_ASSESSMENT: ACTIVITIES ARE NOT PREVENTED
PAIN_FUNCTIONAL_ASSESSMENT: ACTIVITIES ARE NOT PREVENTED
PAIN_FUNCTIONAL_ASSESSMENT: PREVENTS OR INTERFERES SOME ACTIVE ACTIVITIES AND ADLS
PAIN_FUNCTIONAL_ASSESSMENT: ACTIVITIES ARE NOT PREVENTED
PAIN_FUNCTIONAL_ASSESSMENT: PREVENTS OR INTERFERES SOME ACTIVE ACTIVITIES AND ADLS

## 2019-01-01 ASSESSMENT — PAIN DESCRIPTION - ORIENTATION
ORIENTATION: RIGHT;LEFT
ORIENTATION: LOWER
ORIENTATION: RIGHT;LEFT
ORIENTATION: LOWER
ORIENTATION: RIGHT;LEFT
ORIENTATION: RIGHT
ORIENTATION: RIGHT;LEFT
ORIENTATION: RIGHT;LEFT
ORIENTATION: RIGHT
ORIENTATION: RIGHT;LEFT
ORIENTATION: RIGHT
ORIENTATION: RIGHT;LEFT
ORIENTATION: LOWER
ORIENTATION: RIGHT;LEFT

## 2019-01-01 ASSESSMENT — ENCOUNTER SYMPTOMS
GASTROINTESTINAL NEGATIVE: 1
EYE PAIN: 0
GASTROINTESTINAL NEGATIVE: 1
ABDOMINAL PAIN: 0
COUGH: 0
GASTROINTESTINAL NEGATIVE: 1
EYE PAIN: 0
ALLERGIC/IMMUNOLOGIC NEGATIVE: 1
EYES NEGATIVE: 1
VOMITING: 0
SHORTNESS OF BREATH: 1
EYES NEGATIVE: 1
ABDOMINAL PAIN: 0
ALLERGIC/IMMUNOLOGIC COMMENTS: SEASONAL ALLERGIES
SHORTNESS OF BREATH: 1
NAUSEA: 0
EYES NEGATIVE: 1
SHORTNESS OF BREATH: 0
WHEEZING: 0
RESPIRATORY NEGATIVE: 1
COUGH: 0
ABDOMINAL PAIN: 0
RESPIRATORY NEGATIVE: 1
COUGH: 0
WHEEZING: 0
RESPIRATORY NEGATIVE: 1
CHEST TIGHTNESS: 0

## 2019-01-01 ASSESSMENT — PAIN DESCRIPTION - FREQUENCY
FREQUENCY: CONTINUOUS
FREQUENCY: INTERMITTENT
FREQUENCY: INTERMITTENT
FREQUENCY: CONTINUOUS
FREQUENCY: INTERMITTENT
FREQUENCY: CONTINUOUS
FREQUENCY: INTERMITTENT
FREQUENCY: CONTINUOUS
FREQUENCY: CONTINUOUS

## 2019-01-01 ASSESSMENT — PAIN DESCRIPTION - LOCATION
LOCATION: KNEE
LOCATION: SHOULDER;ARM
LOCATION: LEG;KNEE
LOCATION: BACK
LOCATION: ANKLE;ELBOW
LOCATION: KNEE
LOCATION: ANKLE
LOCATION: BACK
LOCATION: ANKLE
LOCATION: KNEE
LOCATION: BACK
LOCATION: KNEE
LOCATION: KNEE
LOCATION: ANKLE

## 2019-01-01 ASSESSMENT — PAIN DESCRIPTION - PROGRESSION
CLINICAL_PROGRESSION: NOT CHANGED
CLINICAL_PROGRESSION: GRADUALLY WORSENING
CLINICAL_PROGRESSION: NOT CHANGED
CLINICAL_PROGRESSION: NOT CHANGED
CLINICAL_PROGRESSION: GRADUALLY WORSENING
CLINICAL_PROGRESSION: GRADUALLY IMPROVING
CLINICAL_PROGRESSION: NOT CHANGED
CLINICAL_PROGRESSION: NOT CHANGED
CLINICAL_PROGRESSION: GRADUALLY IMPROVING
CLINICAL_PROGRESSION: NOT CHANGED
CLINICAL_PROGRESSION: GRADUALLY IMPROVING
CLINICAL_PROGRESSION: NOT CHANGED

## 2019-01-01 ASSESSMENT — PAIN DESCRIPTION - PAIN TYPE
TYPE: CHRONIC PAIN
TYPE: ACUTE PAIN
TYPE: CHRONIC PAIN
TYPE: ACUTE PAIN
TYPE: CHRONIC PAIN
TYPE: ACUTE PAIN
TYPE: ACUTE PAIN;CHRONIC PAIN
TYPE: CHRONIC PAIN
TYPE: ACUTE PAIN
TYPE: CHRONIC PAIN

## 2019-01-01 ASSESSMENT — PAIN DESCRIPTION - DESCRIPTORS
DESCRIPTORS: ACHING;CONSTANT;DISCOMFORT;DULL
DESCRIPTORS: ACHING;CONSTANT
DESCRIPTORS: ACHING;DISCOMFORT
DESCRIPTORS: ACHING
DESCRIPTORS: ACHING;DISCOMFORT
DESCRIPTORS: SORE
DESCRIPTORS: ACHING
DESCRIPTORS: ACHING
DESCRIPTORS: ACHING;DISCOMFORT
DESCRIPTORS: ACHING;DISCOMFORT
DESCRIPTORS: ACHING
DESCRIPTORS: SORE
DESCRIPTORS: ACHING

## 2019-01-01 ASSESSMENT — PATIENT HEALTH QUESTIONNAIRE - PHQ9
SUM OF ALL RESPONSES TO PHQ9 QUESTIONS 1 & 2: 1
2. FEELING DOWN, DEPRESSED OR HOPELESS: 0
SUM OF ALL RESPONSES TO PHQ9 QUESTIONS 1 & 2: 1
SUM OF ALL RESPONSES TO PHQ QUESTIONS 1-9: 1
1. LITTLE INTEREST OR PLEASURE IN DOING THINGS: 1
2. FEELING DOWN, DEPRESSED OR HOPELESS: 0
SUM OF ALL RESPONSES TO PHQ QUESTIONS 1-9: 1
1. LITTLE INTEREST OR PLEASURE IN DOING THINGS: 1
SUM OF ALL RESPONSES TO PHQ QUESTIONS 1-9: 1
SUM OF ALL RESPONSES TO PHQ QUESTIONS 1-9: 1

## 2019-01-01 ASSESSMENT — PULMONARY FUNCTION TESTS
PIF_VALUE: 23
PIF_VALUE: 25
PIF_VALUE: 30
PIF_VALUE: 24
PIF_VALUE: 25
PIF_VALUE: 30
PIF_VALUE: 34
PIF_VALUE: 25
PIF_VALUE: 40
PIF_VALUE: 36
PIF_VALUE: 30
PIF_VALUE: 32
PIF_VALUE: 24
PIF_VALUE: 31
PIF_VALUE: 30
PIF_VALUE: 30

## 2019-01-01 ASSESSMENT — 9 HOLE PEG TEST
TEST_RESULT: FUNCTIONAL
TEST_RESULT: FUNCTIONAL
TESTTIME_SECONDS: 27.6
TEST_RESULT: FUNCTIONAL
TESTTIME_SECONDS: 25.8
TEST_RESULT: FUNCTIONAL
TESTTIME_SECONDS: 27
TEST_RESULT: FUNCTIONAL
TEST_RESULT: FUNCTIONAL

## 2019-01-01 ASSESSMENT — PAIN DESCRIPTION - DIRECTION: RADIATING_TOWARDS: NON-RADIATING

## 2019-04-29 NOTE — PROGRESS NOTES
Subjective:      Patient ID: Edilma Salmon is a 75 y.o. male. HPI   Present to  Office on urgent basis for elevated bp and marked ededma left lower leg  No injury   hx of congestive cardio  Chronic dyspnea with exertion   no chest pain   Min leg pain  Min swelling or fluid retention right leg        Review of Systems   Constitutional: Negative for appetite change, fatigue and unexpected weight change. Eyes: Negative for pain and visual disturbance. Respiratory: Positive for shortness of breath. Negative for cough and wheezing. Cardiovascular: Positive for leg swelling. Negative for chest pain and palpitations. Gastrointestinal: Negative for abdominal pain. Endocrine: Negative for polyuria. Genitourinary: Negative for difficulty urinating. Neurological: Negative for speech difficulty, numbness and headaches. Psychiatric/Behavioral: Negative for confusion and sleep disturbance. A complete 14 point  review of systems was completed; pertinent positives are noted in HPI    Vitals:    04/29/19 1438   BP: (!) 160/90   Weight: 197 lb (89.4 kg)   Height: 5' 10\" (1.778 m)     Body mass index is 28.27 kg/m². Wt Readings from Last 3 Encounters:   04/29/19 197 lb (89.4 kg)   04/29/19 197 lb (89.4 kg)   02/14/19 197 lb (89.4 kg)     BP Readings from Last 3 Encounters:   04/29/19 (!) 160/90   04/29/19 (!) 160/80   02/14/19 (!) 194/73        BP (!) 160/90   Ht 5' 10\" (1.778 m)   Wt 197 lb (89.4 kg)   BMI 28.27 kg/m²    Objective:   Physical Exam   Constitutional: He is oriented to person, place, and time. He appears well-nourished. No distress. HENT:   Mouth/Throat: Oropharynx is clear and moist.   Eyes: Conjunctivae are normal. No scleral icterus. Neck: Neck supple. Cardiovascular: An irregularly irregular rhythm present.    2+ pretibial and lower extrem edema  Neg nai  Neg mosers  Pitting  Good ankle and knee motion   dec distal pulses  No skin ulcerations   Pulmonary/Chest: Effort

## 2019-04-29 NOTE — PROGRESS NOTES
Subjective:      Patient ID: Walter Levi is a 75 y.o. male. HPI  Walter Levi returns today with left knee pain. Saw his right knee back in February and he did very well from a cortisone shot. Left knee has become acutely painful over the last 3 weeks. Week ago pain was 10 out of 10 now is about 5 out of 10. He is concerned these retaining fluid as well. He is using Tylenol arthritis and BenGay. His pain is medial.    Review of Systems   Constitutional: Negative. HENT: Negative. Eyes: Negative. Respiratory: Negative. Cardiovascular: Positive for leg swelling. Gastrointestinal: Negative. Endocrine: Negative. Genitourinary: Negative. Musculoskeletal: Positive for arthralgias and gait problem. Left knee pain   Skin: Negative. Allergic/Immunologic: Positive for environmental allergies. Seasonal allergies   Hematological: Bruises/bleeds easily. Psychiatric/Behavioral: Positive for sleep disturbance. Objective:   Physical Exam  General Exam:    Vitals: Blood pressure (!) 224/90, pulse 73, height 5' 10\" (1.778 m), weight 197 lb (89.4 kg). Constitutional: Patient is adequately groomed with no evidence of malnutrition  Mental Status: The patient is oriented to time, place and person. The patient's mood and affect are appropriate. Gait:  Patient walks with a significant limp. Lymphatic: The lymphatic examination bilaterally reveals all areas to be without enlargement or induration. Vascular: Examination reveals no swelling or calf tenderness. Peripheral pulses are palpable and 2+. Neurological: The patient has good coordination. There is no weakness or sensory deficit. Skin:    Head/Neck: inspection reveals no rashes, ulcerations or lesions. Trunk:  inspection reveals no rashes, ulcerations or lesions. Right Lower Extremity: inspection reveals no rashes, ulcerations or lesions.   Left Lower Extremity: inspection reveals no rashes, ulcerations or lesions. Right knee exam shows no tenderness or swelling. He has mild edema in the right leg. Left leg has 2-3+ pitting edema. He has tenderness over the medial joint line. He has no lateral pain. His moderate discomfort with range of motion but no intra-articular fusion. X-rays from February reviewed AP standing, PA flexed, and merchant views of both knees. He's demonstrate mild arthritic change. Assessment:      Exacerbation of arthritis left knee      Plan:      He did well a cortisone shot in the right knee so we'll try one on the left today. Also strongly recommend he use a cane or walker. Furthermore, his blood pressure was elevated today he is retaining fluid and he needs to see his primary care provider about that. He assured me he would by the end of the week. Procedure: Under sterile technique, the left knee was injected into the anterior lateral arthroscopy portal with 80 mg of Depo-Medrol and 40 mg of lidocaine. He tolerated that well. Follow-up on an as-needed basis. This note was created using voice recognition software. It has been proofread, but occasionally errors remain. Please disregard these errors. They will be corrected as they are noted.

## 2019-05-06 NOTE — TELEPHONE ENCOUNTER
Patient was seen by Dr. Jyoti Koehler 12/5/18. Per the note from Dr. Jyoti Koehler,  \"I have placed a referral to speech pathology for a possible swallow study due to his current symptoms. I am concerned about dysphagia and aspiration. \"    Speech therapy Matthew Miller) called today and said the patient just recently called about setting up appointment, but Mr. Alexa Rashid does not believe he has issues with his swallowing and does not feel the need for Speech Therapy. Martínez Rangel instructed Mr. Alexa Rashid she would reach out to Dr. Jyoti Koehler and if Dr. Jytoi Koehler feels that he needs Speech therapy evaluation, then Dr. Carrillo Vanessa office will reach out to the patient to discuss further. Question is does Dr. Marshall Holland continue to feel the need for Speech Therapy? And if so, please call patient to discuss further.

## 2019-05-06 NOTE — TELEPHONE ENCOUNTER
Dr. Quesada Share     Patient seen in office 12/5/18. He was complaining of cough worsening after eating and dysphagia symptoms. Speech therapy referral was placed for possible swallow study. Patient does not want to pursue speech therapy and does not feel he is having issues with swallowing and does not need to be seen. Do you feel strongly he needs to follow up with this?

## 2019-06-06 NOTE — PROGRESS NOTES
Sharon Marshall returns today for his right knee. Cortisone injection given the very 14 2019 was tremendously helpful until about 2 weeks ago. He has pain again and is requesting another injection. Patient's medications, allergies, past medical, surgical, social and family histories were reviewed and updated as appropriate. Relevant review of systems reviewed. General Exam:    Vitals: Blood pressure (!) 160/80, pulse 61, height 5' 10\" (1.778 m), weight 197 lb (89.4 kg). Constitutional: Patient is adequately groomed with no evidence of malnutrition  Mental Status: The patient is oriented to time, place and person. The patient's mood and affect are appropriate. He walks slowly with an antalgic gait. Right knee has tenderness throughout the medial lateral joint lines. Is no drainable effusion. His moderate patellofemoral crepitation with range motion 10 to about 115°. X-rays from February demonstrate calcification in the femoral vessels and patellofemoral arthritis. Assessment: Exacerbation right knee arthritis. Plan: Cortisone injection. Procedure: Under sterile technique, right knee was injected into the anterior lateral arthroscopy portal with 80 mg of Depo-Medrol and 40 mg of lidocaine. He tolerated that well. Follow-up with me on an as-needed basis    This note was created using voice recognition software. It has been proofread, but occasionally errors remain. Please disregard these errors. They will be corrected as they are noted.

## 2019-06-19 NOTE — PATIENT INSTRUCTIONS

## 2019-06-19 NOTE — PROGRESS NOTES
[address]  1945 June 19, 2019    The patient is 75 y.o. male with a past medical history significant for hypertension, hyperlipidemia and DM II and paroxsymal atrial fibrillation. I performed a cardioversion for him on 6/3/15 and he is on Pradaxa for anticoagulation. He returns to the office today in follow-up. Today, he reports feeling well overall. He does admit to chronic pain in his left knee and has been receiving cortisone injections. He continues to have the exertional pain in his buttocks. This has not worsened in nature for him. He does state his blood pressure was elevated while in office with Dr. Pascual Aguilar. As a result Dr. Ruth Chen increased his dose of Clonidine. He does admit to some mild lower extremity edema but has not been wearing compression stockings as consistently as he should. He continues to take Lasix but has not been taking the afternoon dose. He is compliant with his medications and is tolerating them well. He denies any abnormal bruising or bleeding. Review of Systems:  Denies any PND or orthopnea. Denies any cough productive or otherwise. No recent change in weight. No recent changes in bowel or bladder habits. No easy bleeding or bruising. Denies any arthralgias or myalgias. Review of systems is negative to a 6 point review except as noted above.        No Known Allergies   Current Outpatient Medications   Medication Sig Dispense Refill    cloNIDine (CATAPRES) 0.1 MG tablet Take 1 tablet by mouth 2 times daily 60 tablet 3    furosemide (LASIX) 20 MG tablet 2 tabs in the morning and 1 tab in the late afternoon (Patient taking differently: 2 tabs in the morning) 120 tablet 3    lisinopril (PRINIVIL;ZESTRIL) 5 MG tablet Take 1 tablet by mouth daily 30 tablet 6    carvedilol (COREG) 25 MG tablet Take 1 tablet by mouth 2 times daily 60 tablet 6    dabigatran (PRADAXA) 150 MG capsule Take 1 capsule by mouth 2 times daily 60 capsule 6    metFORMIN (GLUCOPHAGE) 500 MG tablet TAKE ONE TABLET BY MOUTH TWICE A DAY WITH MEALS 180 tablet 0    simvastatin (ZOCOR) 20 MG tablet Take 1 tablet by mouth nightly 90 tablet 1    loperamide (IMODIUM) 2 MG capsule Take 1 capsule by mouth as needed. 180 capsule 1     No current facility-administered medications for this visit. Physical Exam:   /84 (Site: Right Upper Arm, Position: Sitting, Cuff Size: Small Adult)   Pulse 64   Ht 5' 10\" (1.778 m)   Wt 190 lb (86.2 kg)   SpO2 97%   BMI 27.26 kg/m²   Wt Readings from Last 2 Encounters:   06/19/19 190 lb (86.2 kg)   06/06/19 197 lb (89.4 kg)     Constitutional: He is oriented to person, place, and time. He appears well-developed and well-nourished. In no acute distress. Head: Normocephalic and atraumatic. Neck: Neck supple. No JVD present. Carotid bruit is not present. No mass and no thyromegaly present. No lymphadenopathy present. Cardiovascular: Irreg irreg, normal heart souinds and intact distal pulses. Exam reveals no gallop and no friction rub. No murmur heard. Pulmonary/Chest: Effort normal and breath sounds normal. No respiratory distress. He has no wheezes, rhonchi or rales. Abdominal: Soft, non-tender. Bowel sounds and aorta are normal. He exhibits no organomegaly, mass or bruit. Extremities: No edema. No cyanosis, or clubbing. Pulses are 2+ radial/carotid/dorsalis pedis and posterior tibial bilaterally. Neurological: He is alert and oriented to person, place, and time. He has normal reflexes. No cranial nerve deficit. Coordination normal.   Skin: Skin is warm and dry. There is no rash or diaphoresis. Psychiatric: He has a normal mood and affect.  His speech is normal and behavior is normal.     Pulses:   Femoral    L    1     R1  Popliteal    L    1     R1  DP            L     0 +doppler   R0 +doppler  PT             L    0 +doppler R0 +doppler         EKG Interpretation 1/26/16: Sinus rhythm with first degree prepared under my direction and personally reviewed by me in its entirety. I, Dr. Jun Meade personally performed the services described in this documentation as scribed by my RN,  Belkis Lucero in my presence, and I confirm that the note above accurately reflects all work, treatment, procedures, and medical decision making performed by me.

## 2019-08-27 PROBLEM — R65.20 SEVERE SEPSIS (HCC): Status: ACTIVE | Noted: 2019-01-01

## 2019-08-27 PROBLEM — A41.9 SEVERE SEPSIS (HCC): Status: ACTIVE | Noted: 2019-01-01

## 2019-08-27 PROBLEM — E87.29 HIGH ANION GAP METABOLIC ACIDOSIS: Status: ACTIVE | Noted: 2019-01-01

## 2019-08-27 PROBLEM — A41.9 SEPSIS (HCC): Status: ACTIVE | Noted: 2019-01-01

## 2019-08-27 PROBLEM — J96.01 ACUTE RESPIRATORY FAILURE WITH HYPOXIA (HCC): Status: ACTIVE | Noted: 2019-01-01

## 2019-08-27 NOTE — ED NOTES
ED SBAR report provider to Elisa Duncan. Patient to be transported to Room 5103 via stretcher by ED tech. Patient transported with bedside cardiac monitor and with IV medications infusing. IV site clean, dry, and intact. MEWS score and pain assessed and documented. Updated patient and family on plan of care.      Mague Frank RN  08/27/19 1061

## 2019-08-27 NOTE — ED PROVIDER NOTES
palpation. Normal BS, no organomegaly, no abdominal masses  EXTREMITIES:  Normal range of motion, no edema, no tenderness, no deformity, distal pulses present and equal bilaterally. Moves extremities x4 with purpose. He does have chronic venous stasis changes of both lower extremity  SKIN: Warm, dry and intact. NEUROLOGIC: Normal mental status. Moving all extremities to command. Alert and oriented x4 without focal motor deficit or gross sensory deficit. Normal speech. He is able to lift both legs off the bed to get the very weak and he can keep them off the bed for about 2 seconds before letting it drop, this is bilateral not unilateral.   sensation is intact in the upper and lower extremities. Cranial Nerves 2-12 are intact. No facial droop, visual fields intact  PSYCHIATRIC: Not anxious, normal mood and affect, thoughts are linear and organized, without delusions/hallucinations, responds appropriately to questions      LABS and DIAGNOSTIC RESULTS  EKG  The Ekg interpreted by me shows  atrial fibrillation with a rate of 92  Axis is   Normal  QTc is  Prolonged at 502 ms  Intervals and Durations are unremarkable. ST Segments: normal  Delta waves, Brugada Syndrome, and Short GA are not present. Prior EKG to compare with was available. No significant changes compared to prior EKG from 2/19/2019, of note patient did not have a prolonged QT at that previous EKG      Cardiac Monitor Strip Interpretation    Interpreted by me  Monitor strip interpreted for greater than 10 seconds    Rhythm: normal sinus   Rate: normal  Ectopy: none  ST Segments: normal    RADIOLOGY  X-RAYS:  I have reviewed radiologic plain film image(s). ALL OTHER NON-PLAIN FILM IMAGES SUCH AS CT, ULTRASOUND AND MRI HAVE BEEN READ BY THE RADIOLOGIST. CT CHEST PULMONARY EMBOLISM W CONTRAST   Final Result   1. Negative for pulmonary embolus. 2. Space dist related pleural disease.    3. Multifocal right-sided round atelectasis with small 8/27/2019 3:46:00 PM     Compared to previous labs patient does have chronically elevated alkaline phosphate. PROCEDURES      MEDICAL DECISION MAKING    Procedures/interventions/images ordered for this visit  Orders Placed This Encounter   Procedures    XR CHEST PORTABLE    CBC Auto Differential    Comprehensive Metabolic Panel w/ Reflex to MG    Lipase    Urinalysis Reflex to Culture    Initiate Oxygen Therapy Protocol    EKG 12 Lead    Saline lock IV       Medications ordered for this visit  Orders Placed This Encounter   Medications    0.9 % sodium chloride bolus     Discussed the results of the chest x-ray and the lab. Went back to evaluate the patient he still is hypoxic at 92% which is abnormal for him and he continues to be tachypneic and therefore I feel that since the x-ray did not give me any clear explanation for what his clinical signs are I feel that a CT to rule out a PE is necessary at this point. They are aware that this will be done. he does not have any obvious signs of infection on his CAT scan. At this time he only met 1 of the sirs criteria and has no sign of an obvious infection and therefore I am not treating this patient for sepsis. However given a has an elevated white count and and tachypnea with elevated procalcitonin I do feel that IV antibiotics is warranted at this time.     Results for orders placed or performed during the hospital encounter of 08/27/19   CBC Auto Differential   Result Value Ref Range    WBC 19.6 (H) 4.0 - 11.0 K/uL    RBC 3.80 (L) 4.20 - 5.90 M/uL    Hemoglobin 12.0 (L) 13.5 - 17.5 g/dL    Hematocrit 35.2 (L) 40.5 - 52.5 %    MCV 92.7 80.0 - 100.0 fL    MCH 31.5 26.0 - 34.0 pg    MCHC 34.0 31.0 - 36.0 g/dL    RDW 13.7 12.4 - 15.4 %    Platelets 631 306 - 285 K/uL    MPV 7.7 5.0 - 10.5 fL    Neutrophils % 91.4 %    Lymphocytes % 2.0 %    Monocytes % 4.6 %    Eosinophils % 0.1 %    Basophils % 1.9 %    Neutrophils Absolute 17.9 (H) 1.7 - 7.7 K/uL Lymphocytes Absolute 0.4 (L) 1.0 - 5.1 K/uL    Monocytes Absolute 0.9 0.0 - 1.3 K/uL    Eosinophils Absolute 0.0 0.0 - 0.6 K/uL    Basophils Absolute 0.4 (H) 0.0 - 0.2 K/uL   Comprehensive Metabolic Panel w/ Reflex to MG   Result Value Ref Range    Sodium 139 136 - 145 mmol/L    Potassium reflex Magnesium 4.0 3.5 - 5.1 mmol/L    Chloride 98 (L) 99 - 110 mmol/L    CO2 22 21 - 32 mmol/L    Anion Gap 19 (H) 3 - 16    Glucose 185 (H) 70 - 99 mg/dL    BUN 15 7 - 20 mg/dL    CREATININE 1.3 0.8 - 1.3 mg/dL    GFR Non-African American 54 (A) >60    GFR African American >60 >60    Calcium 8.8 8.3 - 10.6 mg/dL    Total Protein 6.4 6.4 - 8.2 g/dL    Alb 3.6 3.4 - 5.0 g/dL    Albumin/Globulin Ratio 1.3 1.1 - 2.2    Total Bilirubin 1.2 (H) 0.0 - 1.0 mg/dL    Alkaline Phosphatase 212 (H) 40 - 129 U/L    ALT 11 10 - 40 U/L    AST 16 15 - 37 U/L    Globulin 2.8 g/dL   Lipase   Result Value Ref Range    Lipase 19.0 13.0 - 60.0 U/L   Procalcitonin   Result Value Ref Range    Procalcitonin 2.80 (H) 0.00 - 0.15 ng/mL   Lactate, Sepsis   Result Value Ref Range    Lactic Acid, Sepsis 2.1 (H) 0.4 - 1.9 mmol/L   EKG 12 Lead   Result Value Ref Range    Ventricular Rate 92 BPM    Atrial Rate 85 BPM    QRS Duration 86 ms    Q-T Interval 406 ms    QTc Calculation (Bazett) 502 ms    R Axis 40 degrees    T Axis 0 degrees    Diagnosis       Atrial fibrillation with premature ventricular or aberrantly conducted complexesProlonged QTAbnormal ECGWhen compared with ECG of 14-AUG-2017 14:28,No significant change was found       I spoke with Dr. Shwetha Tenorio. We thoroughly discussed the history, physical exam, laboratory and imaging studies, as well as, emergency department course. Based upon that discussion, we've decided to admit Oriana Hermosillo for further observation and evaluation of Yevonne Critz dyspnea.   As I have deemed necessary from their history, physical, and studies, I have considered and evaluated Oriana Hermosillo for the following diagnoses:

## 2019-08-27 NOTE — PROGRESS NOTES
LUE  LUE Tone: Normotonic  Coordination  Movements Are Fluid And Coordinated: Yes     Bed mobility  Supine to Sit: Minimal assistance(HOB elevated)  Sit to Supine: Unable to assess(up in chair after session)  Transfers  Sit to stand: Minimal assistance;Contact guard assistance  Stand to sit: Contact guard assistance;Minimal assistance  Transfer Comments: to/from RW; min cues for hand placement     Cognition  Overall Cognitive Status: Exceptions  Arousal/Alertness: Appropriate responses to stimuli  Following Commands:  Follows all commands without difficulty  Attention Span: Appears intact  Memory: Appears intact  Safety Judgement: Decreased awareness of need for assistance  Problem Solving: Assistance required to identify errors made;Assistance required to implement solutions;Assistance required to generate solutions  Insights: Decreased awareness of deficits        Sensation  Overall Sensation Status: WFL        LUE AROM (degrees)  LUE AROM : WFL  Left Hand AROM (degrees)  Left Hand AROM: Exceptions(noted trigger finger)  RUE AROM (degrees)  RUE AROM : WFL  Right Hand AROM (degrees)  Right Hand AROM: WFL  LUE Strength  Gross LUE Strength: Exceptions to [facility]  L Hand General: 4+/5  RUE Strength  Gross RUE Strength: Exceptions to [facility]  R Hand General: 4+/5                   Plan   Plan  Times per week: 3-5x  Current Treatment Recommendations: Strengthening, Functional Mobility Training, Patient/Caregiver Education & Training, Endurance Training, Equipment Evaluation, Education, & procurement, Balance Training, Self-Care / ADL, Safety Education & Training, Gait Training      AM-PAC Score        AM-[facility] Inpatient Daily Activity Raw Score: 16 (08/27/19 1342)  AM-PAC Inpatient ADL T-Scale Score : 35.96 (08/27/19 1342)  ADL Inpatient CMS 0-100% Score: 53.32 (08/27/19 1342)  ADL Inpatient CMS G-Code Modifier : CK (08/27/19 1342)    Goals  Short term goals  Time Frame for Short term goals: prior to D/C  Short term goal 1:

## 2019-08-27 NOTE — PROGRESS NOTES
Physical Therapy    Facility/Department: [facility]  Initial Assessment    NAME: Keke Gandhi  : 1945  MRN: [id number]    Date of Service: 2019    Discharge Recommendations:  Patient would benefit from continued therapy after discharge, 5-7 sessions per week   PT Equipment Recommendations  Other: will continue to assess  Keke Gandhi scored a 17/24 on the AM-PAC short mobility form. Current research shows that an AM-PAC score of 17 or less is typically not associated with a discharge to the patient's home setting. Based on the patients AM-PAC score and their current functional mobility deficits, it is recommended that the patient have 5-7 sessions per week of Physical Therapy at d/c to increase the patients independence. Assessment   Body structures, Functions, Activity limitations: Decreased functional mobility   Assessment: pt is a 74 yo male who was adm to hosp with weakness; states he slipped off EOB at home and was unable to get up off the floor therefore was on the floor for a few hours before he would allow his wife to call EMS; pt found to have sepsis and possible PNA; feel pt is a high fall risk and well below his baseline of being Ind without an AD; pt needs to be more Ind to be able to return home; feel pt will benefit from continued therapy to address deficits to lessen his fall risk and allow pt to return home when able  Prognosis: Good  Decision Making: Medium Complexity  PT Education: PT Role;General Safety  Patient Education: reviewed need for continued therapy at discharge  Barriers to Learning: none  REQUIRES PT FOLLOW UP: Yes  Activity Tolerance  Activity Tolerance: Patient limited by endurance; Patient Tolerated treatment well       Patient Diagnosis(es): The primary encounter diagnosis was Hypoxemia requiring supplemental oxygen. Diagnoses of Tachypnea and Generalized weakness were also pertinent to this visit.      has a past medical history of Atrial

## 2019-08-27 NOTE — ED NOTES
Bed: A-17  Expected date:   Expected time:   Means of arrival:   Comments:  [address] [facility] weakness     Omar George RN  08/27/19 [id number]

## 2019-08-27 NOTE — H&P
panel in a.m., telemetry monitoring, plan of care discussed with patient in details, all questions answered. 2.  Possible pneumonia, IV antibiotics as above, DuoNeb, continuous pulse oximetry. 3.  Diabetes mellitus, hold p.o. medication, sliding scale for now. 4.  Anion gap metabolic acidosis, with mildly elevated lactic acid, could be due to sepsis, I cannot completely rule out effect of metformin with acute sickness, will hold metformin for now receiving IV fluid, will repeat lactic acid. 5.  Atrial fibrillation, on oral anticoagulation. 6.  Prolonged QT, will avoid azithromycin or Levaquin at this time  7. Generalized weakness due to above, PT OT. 8.  Alcohol use, patient stated he drinks 3 beers a day, counseled for cutting down, will start thiamine and folic acid no sign of withdrawal.  9.  Essential hypertension, will restart p.o. medication    DVT Prophylaxis: OAC  Diet: No diet orders on file  Code Status: Prior    PT/OT Eval Status: ordered    Dispo - inpatient 2-4 days       Gilberto Pearce MD    Thank you Lorenza Beckham DO for the opportunity to be involved in this patient's care. If you have any questions or concerns please feel free to contact me at [phone].

## 2019-08-27 NOTE — PROGRESS NOTES
sticking sensation or choking. Pt reports he never brings up food- just phlegm     Pain:  Denied    Reason for Referral  Mary Rogers was referred for a bedside swallow evaluation to assess the efficiency of his swallow function, identify signs and symptoms of aspiration and make recommendations regarding safe dietary consistencies, effective compensatory strategies, and safe eating environment. Impression  Dysphagia Diagnosis: Mild pharyngeal stage dysphagia   · Oral phase of swallow was WFLs  · Pt with slight delay in swallow and was noted to have multiple swallows per boluses. No overt s/s of penetration or aspiration noted. REC continue on diet as ordered with f/u tolerance. Dysphagia Outcome Severity Scale: Level 5: Mild dysphagia- Distant supervision. May need one diet consistency restricted     Treatment Plan  Requires SLP Intervention: Yes  Duration/Frequency of Treatment: follow up tolerance x1-3 during admission  D/C Recommendations: To be determined    Recommended Diet and Intervention  Diet Solids Recommendation: Regular  Liquid Consistency Recommendation: Thin  Recommended Form of Meds: PO  Recommendations: Dysphagia treatment  Therapeutic Interventions: Diet tolerance monitoring;Patient/Family education    Compensatory Swallowing Strategies  Compensatory Swallowing Strategies: Small bites/sips;Eat/Feed slowly;Upright as possible for all oral intake    Treatment/Goals  Dysphagia Goals: The patient will tolerate recommended diet without observed clinical signs of aspiration; The patient/caregiver will demonstrate understanding of compensatory strategies for improved swallowing safety. General  Chart Reviewed: Yes  Behavior/Cognition: Alert; Cooperative;Pleasant mood  Communication Observation: Functional  Follows Directions: Complex  Dentition: Adequate  Patient Positioning: Upright in chair  Baseline Vocal Quality: Normal  Volitional Cough: Strong  Consistencies Administered: Reg

## 2019-08-28 NOTE — PROGRESS NOTES
focal weakness   Psychiatric: Alert and oriented  Capillary Refill: Brisk,< 3 seconds   Peripheral Pulses: +2 palpable, equal bilaterally       Labs:   Recent Labs     08/27/19  0529 08/28/19 0432   WBC 19.6* 11.7*   HGB 12.0* 11.4*   HCT 35.2* 33.9*    160     Recent Labs     08/27/19  0528 08/28/19 0432    138   K 4.0 4.6   CL 98* 103   CO2 22 25   BUN 15 14   CREATININE 1.3 1.2   CALCIUM 8.8 8.8     Recent Labs     08/27/19  0528 08/28/19  0432   AST 16 20   ALT 11 11   BILITOT 1.2* 0.9   ALKPHOS 212* 187*     No results for input(s): INR in the last 72 hours. No results for input(s): [place] in the last 72 hours. Urinalysis:      Lab Results   Component Value Date    NITRU Negative 08/27/2019    WBCUA 6 08/27/2019    RBCUA 3 08/27/2019    BLOODU Negative 08/27/2019    SPECGRAV >1.030 08/27/2019    GLUCOSEU Negative 08/27/2019       Radiology:  CT CHEST PULMONARY EMBOLISM W CONTRAST   Final Result   1. Negative for pulmonary embolus. 2. Space dist related pleural disease. 3. Multifocal right-sided round atelectasis with small parapneumonic   effusion. The overall appearance is unchanged from 2017. XR CHEST PORTABLE   Final Result   Stable appearing chest without evidence of acute process and chronic changes   about the right lower lung field/costophrenic angle as described. Assessment/Plan:    Active Hospital Problems    Diagnosis    Severe sepsis ([address] [A41.9, R65.20]    High anion gap metabolic acidosis [M27.1]    Acute respiratory failure with hypoxia (HCC) [J96.01]    Essential hypertension [I10]    Atrial fibrillation (HCC) [I48.91]    Type 2 diabetes mellitus with diabetic polyneuropathy, without long-term current use of insulin ([address] [E11.42]     1.   Severe sepsis, criteria of tachypnea, elevated white count along with mildly elevated lactic acid, received IV fluid in emergency department, then maintenance fluids, etiology likely pneumonia and UTI, LA improved, WBC decreasing, afebrile now, procalcitonin increased, will increase ceftiraxone to 2 grams iv daily, will repeat procalcitonin in am, will continue to monitor, will repeat CBC in am.   2.  Pneumonia, IV antibiotics as above, DuoNeb, continuous pulse oximetry. 3.  Uncontrolled hypertension, p.o. Medication, added PRN labetalol, telemetry, discussed with nurse. 4.  Anion gap metabolic acidosis, with mildly elevated lactic acid, improving. 5.  Atrial fibrillation, on oral anticoagulation. 6.  Prolonged QT, will avoid azithromycin or Levaquin at this time  7. Generalized weakness due to above, PT OT. 8.  Alcohol use, patient stated he drinks 3 beers a day, counseled for cutting down, started on thiamine and folic acid no sign of withdrawal.  9. Diabetes mellitus, hold p.o. medication, sliding scale for now.        Diet: DIET GENERAL; Carb Control: 4 carb choices (60 gms)/meal  Code Status: Full Code        Dispo - ongoing management     Jami Grimes MD

## 2019-08-28 NOTE — PLAN OF CARE
Nutrition Problem: Altered nutrition-related lab values  Intervention: Food and/or Nutrient Delivery: Continue current diet(offer least restrictive diet to help ensure adequacy.  Will add no salt packet to trays for now. )  Nutritional Goals: tolerate most appropriate form of nutrition

## 2019-08-28 NOTE — PROGRESS NOTES
Tolerance, Skin Integrity, Weight, Pertinent Labs, Nausea or Vomiting, Diarrhea, Constipation      Electronically signed by Florian Cooper RD, LD on 8/28/19 at 2:58 PM    Contact Number: [phone]

## 2019-08-29 NOTE — PROGRESS NOTES
0800 Patient resting quietly in bed at present time. IVF infusing per pump without difficulty. Voiding per urinal without difficulty. 0930 Assessment completed and documented. Patient attempting to climb out of bed periodically sounding bed alarm. 0957 Cardene drip turned off at present time. 1240 No changes noted since previous assessment. Patient continues to attempt to get out of bed periodically. 1450 OT at bedside to see patient and ambulated patient in hallway, patient tolerated well. Returned to chair, reclined, chair alarm in place, family at bedside. 1520 Dr Herber Gomez contacted per family request for PRN medication for patient's anxiety. One time Ativan order obtained. 1540 Patient dozing comfortably in recliner, chair alarm in place, wife at bedside. One time Ativan dose not given at present time. 1815 Patient remains up in chair, tolerating well to present time, does not to go to bed at present time. Wife remains at bedside.   Electronically signed by Shena Alejandra RN on 8/29/2019 at 8:06 PM

## 2019-08-29 NOTE — PROGRESS NOTES
Occupational Therapy    Pt transferred to ICU d/t change in medical status. Please re-order therapy when pt medically appropriate to participate.     eNlida Rueda, OTR/L

## 2019-08-29 NOTE — PLAN OF CARE
Problem: Falls - Risk of:  Goal: Will remain free from falls  Description  Will remain free from falls  Outcome: Ongoing   Falling star program remains in place. Call light and personal belongings within reach. Frequent visual monitoring continues. Toileting program inplace. Patient assisted in turning/repositioning at least once every 2 hours, and on a prn basis. Problem: Risk for Impaired Skin Integrity  Goal: Tissue integrity - skin and mucous membranes  Description  Structural intactness and normal physiological function of skin and  mucous membranes. Outcome: Ongoing   Monitoring patient skin integrity for skin breakdown, turning and repositioning with pillow support q2h per protocol. Patient demonstrates turning and repositioning self. Problem: Pain:  Goal: Pain level will decrease  Description  Pain level will decrease  Outcome: Ongoing   Continuing to monitor pain and discomfort. Monitoring pain level on scale of 0-10. Non- pharmacological measures encouraged to reduce discomfort/pain. PRN pain meds administeration continues when/if applicable as ordered by physician.    Electronically signed by Charissa Alejo RN on 8/29/2019 at 2:52 PM

## 2019-08-29 NOTE — PROGRESS NOTES
Occupational Therapy  Fac[place]Department[place] [facility]  Daily Treatment Note  NAME: Ronn Teran  : 1945  MRN: [id number]    Date of Service: 2019    Discharge Recommendations:  IP Rehab  OT Equipment Recommendations  Other: continue to assess    Ronn Teran scored a 17/24 on the AM-PAC ADL Inpatient form. Current research shows that an AM-PAC score of 17 or less is typically not associated with a discharge to the patient's home setting. Based on the patients AM-PAC score and their current ADL deficits, it is recommended that the patient have 5-7 sessions per week of Occupational Therapy at d/c to increase the patients independence. New order received following transfer to ICU    Assessment   Performance deficits / Impairments: Decreased functional mobility ; Decreased strength;Decreased endurance;Decreased ADL status; Decreased high-level IADLs;Decreased balance  Assessment: Pt motivated for therapy this date. PTA pt was living at home and independent with ADLs and mobility without AD. Today, denied ADLs but able to complete functional mobility with use of RW. Pt on 2L O2 with activity this date and easily fatigued. Pt currently functioning below baseline and would benefit from skilled therapy prior to returning home. Pt could tolerate 3 hours of therapy. Prognosis: Good  Assistance / Modification: RW  OT Education: OT Role;Plan of Care  REQUIRES OT FOLLOW UP: Yes  Activity Tolerance  Activity Tolerance: Patient Tolerated treatment well  Safety Devices  Safety Devices in place: Yes  Type of devices: Call light within reach; Left in chair;Nurse notified(CHERYL Fu)         Patient Diagnosis(es): The primary encounter diagnosis was Hypoxemia requiring supplemental oxygen. Diagnoses of Tachypnea and Generalized weakness were also pertinent to this visit.       has a past medical history of Atrial fibrillation ([address] CHF (congestive heart failure) ([address] Hyperlipidemia, Hypertension, and Type II or unspecified type diabetes mellitus without mention of complication, not stated as uncontrolled. has a past surgical history that includes colectomy (2009). Restrictions  Restrictions/Precautions  Restrictions/Precautions: Fall Risk  Position Activity Restriction  Other position/activity restrictions: IV  Subjective   General  Chart Reviewed: Yes  Patient assessed for rehabilitation services?: Yes  Additional Pertinent Hx: per MD note, Alyx Peoples is a 75 y.o. male who presents to the ED for extremity weakness and hypoxemia. Patient states he attempted to get out of bed approximately 3 hours prior to arrival and he ended up sliding to the floor. Did not hit his head. He laid on the floor for up to 3 hours he could not get up on his own and he was extremely weak. His wife is unable to get him up. Initially he refused to let her call the ambulance but finally after 3 hours he allowed him to call the ambulance. They reported his fingerstick is greater than 100 but they did find him to be slightly hypoxic at 86% and they treated him with nasal cannula. Patient has a remote history of smoking. He has a history of atrial fibrillation and is currently anticoagulated. Denies any recent illnesses. ; ICU transfer d/t elevated BP  Family / Caregiver Present: Yes  Referring Practitioner: Kenny Rhodes MD  Subjective  Subjective: Pt seen bedside and agreeable to therapy. Pt denied pain.  SpO2 90-03% on 2L with activity; new order received following transfer to ICU  General Comment  Comments: Per RN, ok for therapy      Orientation  Orientation  Overall Orientation Status: Within Functional Limits  Objective    ADL  Additional Comments: declined ADLs        Balance  Sitting Balance: Stand by assistance  Standing Balance: Contact guard assistance  Standing Balance  Time: prn for mobility  Functional Mobility  Functional - Mobility Device: Rolling Walker  Assist Level: Contact guard assistance  Functional Co-treatment   Time In 1430         Time Out 1445         Minutes 15         Timed Code Treatment Minutes: 15 Minutes      This note to serve as OT d/c summary if pt is d/c-ed prior to next therapy session.       Amos July, OTR/L

## 2019-08-30 NOTE — CARE COORDINATION
spoke w/ MD in rounds. Discussed therapy recs from yesterday indicated need for acute rehab.  MD would prefer to defer decision on PM&R consult depending on pts progress  Electronically signed by Landry Henao RN on 8/30/2019 at 2:16 PM

## 2019-08-30 NOTE — PROGRESS NOTES
Hospitalist Progress Note      PCP: Arcelia Esparza DO    Date of Admission: 8/27/2019         Subjective: feels ok, no SOB at rest, no chest pain, no fever or chills. Medications:  Reviewed    Infusion Medications    niCARdipine Stopped (08/29/19 0957)    dextrose       Scheduled Medications    cloNIDine  0.1 mg Oral TID    LORazepam  0.5 mg Oral Once    furosemide  20 mg Oral Daily    cefTRIAXone (ROCEPHIN) IV  2 g Intravenous Q24H    sodium chloride flush  10 mL Intravenous 2 times per day    sodium chloride  1,000 mL Intravenous Once    doxycycline hyclate  100 mg Oral 2 times per day    carvedilol  25 mg Oral BID    dabigatran  150 mg Oral BID    lisinopril  5 mg Oral Daily    simvastatin  20 mg Oral Nightly    insulin lispro  0-6 Units Subcutaneous TID WC    insulin lispro  0-3 Units Subcutaneous Nightly    ipratropium-albuterol  1 ampule Inhalation N2J WA    folic acid  1 mg Oral Daily    thiamine (VITAMIN B1) IVPB  100 mg Intravenous Daily     PRN Meds: labetalol, hydrALAZINE, sodium chloride flush, LORazepam **OR** LORazepam **OR** LORazepam **OR** LORazepam **OR** LORazepam **OR** LORazepam **OR** LORazepam **OR** LORazepam, magnesium hydroxide, ondansetron, glucose, dextrose, glucagon (rDNA), dextrose      Intake/Output Summary (Last 24 hours) at 8/30/2019 0730  Last data filed at 8/29/2019 2300  Gross per 24 hour   Intake 1052.9 ml   Output 580 ml   Net 472.9 ml       Physical Exam Performed:    BP (!) 144/57   Pulse 99   Temp 98.2 °F (36.8 °C) (Oral)   Resp 16   Ht 5' 9\" (1.753 m)   Wt 195 lb 1.7 oz (88.5 kg)   SpO2 95%   BMI 28.81 kg/m²     General appearance: No apparent distress  Neck: Supple. Respiratory:  Normal respiratory effort. Clear to auscultation, bilaterally without Rales/Wheezes/Rhonchi. Cardiovascular: Regular rate and rhythm with normal S1/S2 without murmurs, rubs or gallops.   Abdomen: Soft, non-tender, non-distended with normal bowel sounds. Musculoskeletal: No clubbing  Skin: Skin color, texture, turgor normal.  No rashes or lesions. Neurologic:  No focal weakness   Psychiatric: Alert and oriented  Capillary Refill: Brisk,< 3 seconds   Peripheral Pulses: +2 palpable, equal bilaterally       Labs:   Recent Labs     08/28/19 0432 08/29/19 0457 08/30/19 0432   WBC 11.7* 11.5* 7.6   HGB 11.4* 11.2* 10.7*   HCT 33.9* 33.2* 31.0*    191 173     Recent Labs     08/28/19 0432 08/29/19 0457 08/30/19 0433    140 141   K 4.6 4.3 4.0    100 102   CO2 25 24 26   BUN 14 16 14   CREATININE 1.2 1.1 1.0   CALCIUM 8.8 8.9 8.7     Recent Labs     08/28/19 0432 08/29/19 0457 08/30/19 0433   AST 20 18 16   ALT 11 11 10   BILITOT 0.9 0.9 1.0   ALKPHOS 187* 173* 183*     No results for input(s): INR in the last 72 hours. No results for input(s): [place] in the last 72 hours. Urinalysis:      Lab Results   Component Value Date    NITRU Negative 08/27/2019    WBCUA 6 08/27/2019    RBCUA 3 08/27/2019    BLOODU Negative 08/27/2019    SPECGRAV >1.030 08/27/2019    GLUCOSEU Negative 08/27/2019       Radiology:  XR CHEST PORTABLE   Final Result   Persistent opacification right lung base shown on recent CT to represent   rounded atelectasis and right pleural fluid. Stable appearing chest         CT CHEST PULMONARY EMBOLISM W CONTRAST   Final Result   1. Negative for pulmonary embolus. 2. Space dist related pleural disease. 3. Multifocal right-sided round atelectasis with small parapneumonic   effusion. The overall appearance is unchanged from 2017. XR CHEST PORTABLE   Final Result   Stable appearing chest without evidence of acute process and chronic changes   about the right lower lung field/costophrenic angle as described.                  Assessment/Plan:    Active Hospital Problems    Diagnosis    Severe sepsis ([facility] [address] [A41.9, R65.20]    High anion gap metabolic acidosis [B18.3]    Acute respiratory failure with

## 2019-08-31 NOTE — PROGRESS NOTES
Rales/Wheezes/Rhonchi. Cardiovascular: Regular rate and rhythm with normal S1/S2 without murmurs, rubs or gallops. Abdomen: Soft, non-tender  Musculoskeletal: No clubbing  Skin: Skin color, texture, turgor normal.  No rashes or lesions. Neurologic:  No focal weakness   Psychiatric: Alert and oriented  Capillary Refill: Brisk,< 3 seconds   Peripheral Pulses: +2 palpable, equal bilaterally       Labs:   Recent Labs     08/29/19 0457 08/30/19 0432   WBC 11.5* 7.6   HGB 11.2* 10.7*   HCT 33.2* 31.0*    173     Recent Labs     08/29/19 0457 08/30/19 0433 08/31/19 0438    141 141   K 4.3 4.0 3.7    102 102   CO2 24 26 26   BUN 16 14 14   CREATININE 1.1 1.0 1.0   CALCIUM 8.9 8.7 8.4     Recent Labs     08/29/19 0457 08/30/19 0433 08/31/19 0438   AST 18 16 24   ALT 11 10 15   BILITOT 0.9 1.0 1.0   ALKPHOS 173* 183* 212*     No results for input(s): INR in the last 72 hours. No results for input(s): [place]  in the last 72 hours. Urinalysis:      Lab Results   Component Value Date    NITRU Negative 08/27/2019    WBCUA 6 08/27/2019    RBCUA 3 08/27/2019    BLOODU Negative 08/27/2019    SPECGRAV >1.030 08/27/2019    GLUCOSEU Negative 08/27/2019       Radiology:  XR CHEST PORTABLE   Final Result   Findings as above likely related to worsening edema. XR CHEST PORTABLE   Final Result   Persistent opacification right lung base shown on recent CT to represent   rounded atelectasis and right pleural fluid. Stable appearing chest         CT CHEST PULMONARY EMBOLISM W CONTRAST   Final Result   1. Negative for pulmonary embolus. 2. Space dist related pleural disease. 3. Multifocal right-sided round atelectasis with small parapneumonic   effusion. The overall appearance is unchanged from 2017. XR CHEST PORTABLE   Final Result   Stable appearing chest without evidence of acute process and chronic changes   about the right lower lung field/costophrenic angle as described.

## 2019-08-31 NOTE — PROGRESS NOTES
0715: Handoff received from [facility]. Patient resting comfortably in chair. 0800: Assessment completed, see documentation. AM medications administered. Patient eating breakfast in chair. Complete linen change completed. Patient bathed and oral care completed independently. 1000: PRN labetalol administered per orders. 1200: Patient assisted to bed for echo.   1400: Patient assisted to chair. CHF education provided, 15 min. 1800: Patient assisted to bed. PRN labetalol administered per orders. No needs at this time. VSS throughout shift.      Electronically signed by Shu Meeks RN on 8/31/2019 at 6:26 PM

## 2019-08-31 NOTE — PROGRESS NOTES
alleviating or aggravating factors, found to have saturation of 86% on room air, had episode of chills last night in emergency department found to have elevated white count, tachypnea, borderline tachycardic, and elevated lactic acid along with elevated procalcitonin. Patient had also some nausea yesterday without vomiting, denies any abdominal pain, diarrhea, headache, blurry vision, double vision, neck pain or photophobia. No recent sick contact. ; pt transferred to ICU due to elevated BP 8-28  Response To Previous Treatment: Patient with no complaints from previous session. Family / Caregiver Present: No  Referring Practitioner: Dr. Rayna Zhang. Diagnosis: Hypoemia, Tachypenia and General Weak. Follows Commands: Within Functional Limits  Subjective  Subjective: Pt pleasant, agreeable to cont PT Rx. Feeling alittle weak although improving slowly. Pain Screening  Patient Currently in Pain: Denies          Orientation  Orientation  Overall Orientation Status: Within Functional Limits  Social/Functional History  Social/Functional History  Lives With: Spouse  Type of Home: House(condo)  Home Access: Stairs to enter with rails  Entrance Stairs - Number of Steps: 15  Entrance Stairs - Rails: Both  Bathroom Shower/Tub: Walk-in shower  Bathroom Toilet: Standard  Bathroom Accessibility: Accessible  Home Equipment: Rolling walker, Standard walker  ADL Assistance: Independent  Homemaking Assistance: (wife does household tasks; assists with walking)  Ambulation Assistance: Independent  Transfer Assistance: Independent  Active : Yes  Mode of Transportation: Car  Occupation: Retired  Cognition        Objective          AROM RLE (degrees)  RLE AROM: WFL  AROM LLE (degrees)  LLE AROM : WFL  AROM RUE (degrees)  RUE AROM : WFL  AROM LUE (degrees)  LUE AROM : WFL  Strength RLE  Comment: 3+ 4-/5. Strength LLE  Comment: 3+ 4-/5.       Sensation  Overall Sensation Status: WFL  Bed mobility  Supine to Sit: Minimal

## 2019-09-01 NOTE — PROGRESS NOTES
0745-Pt incontinent of urine, underwear and bedpad saturated. Complete line and gown changed, pt placed in depends. 0830-Dr. Frazier rounding on pt, new orders received. MD request manual BP check x1.  0845-Pt resting quietly in bed, VSS, afebrile. Manual BP checked is 168/86.  0935-Pt awake in bed, swallows pills one at a time with sips of water. 1045-Pt asleep in bed, quiet, appears comfortable, bed alarm on.  1145-Pt's wife at bedside, updated. Pt drowsy. 1220-Pt BP is 187/95, pt given prn Hydralazine IVP. 1300-Pt assisted up to chair using walker, assist x2 RN, gait unsteady, pt has difficulty weight-bearing on L knee due to stiffness. Chair alarm on, awaiting lunch tray. Wife at bedside. 1444-Pt given prn Tylenol for bilateral knee pain. 1530-Pt dozing off and on in chair, family at bedside. 1645-Pt remains up in chair, asleep. Wife at beside. No change in assessment. 1715-Criss Jennings sent perfect serve to update on pt drowsiness all day. 1728-Order received for ABG from Dr. Fela Carson, call with results. 1830-Criss Jennings sent perfect serve of ABG results. No new orders received. 1905-Handoff completed with night shift RN [facility].

## 2019-09-01 NOTE — PROGRESS NOTES
Hospitalist Progress Note      PCP: Obed Milligan DO    Date of Admission: 8/27/2019      Subjective: no new complaints, no fever, chills, chest pain or SOB ,apparently had some agitation last night and received ativan per [facility]. No family member at bedside. Medications:  Reviewed    Infusion Medications    dextrose       Scheduled Medications    carvedilol  37.5 mg Oral BID    lisinopril  20 mg Oral Daily    furosemide  40 mg Oral Daily    cloNIDine  0.2 mg Oral TID    labetalol  10 mg Intravenous Once    LORazepam  0.5 mg Oral Once    cefTRIAXone (ROCEPHIN) IV  2 g Intravenous Q24H    sodium chloride flush  10 mL Intravenous 2 times per day    sodium chloride  1,000 mL Intravenous Once    doxycycline hyclate  100 mg Oral 2 times per day    dabigatran  150 mg Oral BID    simvastatin  20 mg Oral Nightly    insulin lispro  0-6 Units Subcutaneous TID WC    insulin lispro  0-3 Units Subcutaneous Nightly    ipratropium-albuterol  1 ampule Inhalation X8N WA    folic acid  1 mg Oral Daily    thiamine (VITAMIN B1) IVPB  100 mg Intravenous Daily     PRN Meds: guaiFENesin-dextromethorphan, perflutren lipid microspheres, labetalol, hydrALAZINE, sodium chloride flush, LORazepam **OR** LORazepam **OR** LORazepam **OR** LORazepam **OR** LORazepam **OR** LORazepam **OR** LORazepam **OR** LORazepam, magnesium hydroxide, ondansetron, glucose, dextrose, glucagon (rDNA), dextrose      Intake/Output Summary (Last 24 hours) at 9/1/2019 0832  Last data filed at 8/31/2019 1900  Gross per 24 hour   Intake 168 ml   Output 410 ml   Net -242 ml       Physical Exam Performed:    BP (!) 196/86   Pulse 90   Temp 97.9 °F (36.6 °C) (Oral)   Resp 20   Ht 5' 9\" (1.753 m)   Wt 194 lb 7.1 oz (88.2 kg)   SpO2 91%   BMI 28.71 kg/m²     General appearance: No apparent distress  Neck: Supple  Respiratory:  Normal respiratory effort. Clear to auscultation, bilaterally without Rales/Wheezes/Rhonchi.   Cardiovascular:

## 2019-09-02 NOTE — PROGRESS NOTES
Transferred from ICU to 5110. Oriented to room, unit protocol, safety, and plan of care as ordered. See assessment. VSS. Placed on tele box 55 and verified with CMU. No needs voiced at this time. Call light in reach and demonstrated use. Kemar Noel RN

## 2019-09-03 NOTE — PROGRESS NOTES
Physical Therapy  Facility/Department: [facility] 5W PROGRESSIVE CARE  Daily Treatment Note  NAME: Erik Gorman  : 1945  MRN: [id number]    Date of Service: 9/3/2019    Discharge Recommendations:  Patient would benefit from continued therapy after discharge, 3-5 sessions per week   PT Equipment Recommendations  Other: Will monitor for potential equipt needs. Erik Gorman scored a  on the AM-PAC short mobility form. Current research shows that an AM-PAC score of 17 or less is typically not associated with a discharge to the patient's home setting. Based on the patients AM-PAC score and their current functional mobility deficits, it is recommended that the patient have 3-5 sessions per week of Physical Therapy at d/c to increase the patients independence. Assessment   Body structures, Functions, Activity limitations: Decreased functional mobility ; Decreased endurance  Assessment: pt having a set back with functional mobility tasks as pt in too much pain to attempt getting OOB; pt reports his L knee is extremely painful as well as discomfort in RLE. Pt is a high fall risk and will need continued therapy at discharge to address deficits to allow pt to meet his goal of returning home when able  Prognosis: Fair  PT Education: General Safety  Patient Education: reviewed with pt importance of getting up OOB and working with therapy  Barriers to Learning: anxious due to pain in LEs  REQUIRES PT FOLLOW UP: Yes  Activity Tolerance  Activity Tolerance: Patient limited by pain     Patient Diagnosis(es): The primary encounter diagnosis was Hypoxemia requiring supplemental oxygen. Diagnoses of Tachypnea and Generalized weakness were also pertinent to this visit. has a past medical history of Atrial fibrillation ([address] CHF (congestive heart failure) ([facility] [address] Hyperlipidemia, Hypertension, and Type II or unspecified type diabetes mellitus without mention of complication, not stated as uncontrolled.    has a past

## 2019-09-03 NOTE — CARE COORDINATION
Discharge Planning:  SW met with pt to discuss d/c plans as PT/OT are now recommending SNF placement. Pt stated that if he should have to go to a SNF, he would pre lynda Monsalve. SW spoke with Pamela at [facility] who reviewed pts information. Pamela stated that this facility would not be able to meet pts needs. Pt stated that at this time he did not want to talk about any other SNFs, as he feels that he will be able to walk again tomorrow sine having his knees injected and he will be able to then be evaluated for [facility]. Pt requested that this SW again meet with him on 9/04 after he has his therapy. Plan: Pt still wants to be evaluated for [facility] and feels that since he had his knees injected, he would be a candidate. SW to again meet with pt on 9/4 after PT/OT see pt. SNF list oleft with pt.   Electronically signed by Adriana Roberts on 9/3/2019 at 2:36 PM

## 2019-09-03 NOTE — PLAN OF CARE
Ongoing     Problem: Breathing Pattern - Ineffective:  Goal: Ability to achieve and maintain a regular respiratory rate will improve  Description  Ability to achieve and maintain a regular respiratory rate will improve  Outcome: Ongoing     Problem: ACTIVITY INTOLERANCE/IMPAIRED MOBILITY  Goal: Mobility/activity is maintained at optimum level for patient  Outcome: Ongoing     Problem: OXYGENATION/RESPIRATORY FUNCTION  Goal: Patient will maintain patent airway  Outcome: Ongoing     Problem: HEMODYNAMIC STATUS  Goal: Patient has stable vital signs and fluid balance  Outcome: Ongoing     Problem: FLUID AND ELECTROLYTE IMBALANCE  Goal: Fluid and electrolyte balance are achieved/maintained  Outcome: Ongoing    Electronically signed by Radha Clark RN on 9/2/2019 at 11:57 PM

## 2019-09-03 NOTE — PLAN OF CARE
Nutrition Problem: Inadequate oral intake  Intervention: Food and/or Nutrient Delivery: Continue current diet  Nutritional Goals: Patient will consume and tolerate 50%< of PO meals and snacks provided

## 2019-09-03 NOTE — PROGRESS NOTES
Addendum to Resident H& P/Progress note/ Discharge Summary:    Patient has been seen,examined and evaluated . I have reviewed the current history, physical findings, labs and assessment and plan and agree with note as documented by resident . In addition:   Patient is a 79-year-old male with a past medical history of A. fib, CHF, hypertension, hyperlipidemia who presented to [facility] with shortness of breath cough and was found to have an oxygen saturation of 86%. He was admitted with possible sepsis and possible pneumonia. Patient was also found to have uncontrolled hypertension and was started on a Cardene drip on admission. That has been switched to oral blood pressure medications with good blood pressure control      Possible sepsis/SIRS  -No clear focus of infection. Was treated for possible pneumonia    Possible pneumonia. -CT scan shows no pneumonia. Patient finished 7 days of antibiotics. Will stop ceftriaxone. UTI- ruled out  - urine c/s NGTD    B knee pain with weakness  - arthritis  - ortho consulted  - for injection  -MRI shows some mild spinal canal stenosis. Diastolic CHF-mild exacerbation  -Echo shows normal EF with diastolic dysfunction  -We will give a dose of Lasix  -Monitor I n O weight    A. Fib  -rate controlled  - ct Ac    History of alcohol abuse/dependence  -No signs of withdrawal.  [facility]    Resident Progress Note  PGY-3    Admit Date: 2019    PCP: Brie Head DO                  : 1945  MRN: [id number]    Subjective: Interval History:     No acute events overnight. Patient seen at bedside this morning. Wife present at bedside. Patient complaining of pain in bilateral knees that is worsened with movement and has a wet cough. The patient denies fevers, chills, chest pain, nausea, vomiting, diarrhea, or constipation.      Diet: DIET GENERAL; Carb Control: 4 carb choices (60 gms)/meal; Low Sodium who was admitted with concern of severe sepsis from pneumonia versus a UTI.      Fluid overload   2/2 receiving aggressive fluid resuscitation for sepsis on admission in setting of diastolic HF, pulmonary congestion on imaging, crackles on exam today   - lasix 40 mg IV x 1, re-assess tomorrow, may need more IV doses   - strict ins and outs  - daily weights     Bilateral knee pain with left knee effusion in setting of history of gout (possibly induced with diuresis?)   - started on prednisone 09/02, clinically improving  - concern for component of osteoarthritis, gets steroids injections every 3-4 months   - seen by orthopedic surgery today, plan for aspiration of left knee and steroids to bilateral knees   - PT/OT    Atrial fibrillation, rate controlled   [id number] Score for Atrial Fibrillation Stroke Risk   Risk   Factors  Component Value   C CHF Yes 1   H HTN Yes 1   A2 Age >= 75 No,  (70 y.o.) 0   D DM Yes 1   S2 Prior Stroke/TIA No 0   V Vascular Disease No 0   A Age 74-69 Yes,  (70 y.o.) 1   Sc Sex male 0    [id number]  Score  4   - continue pradaxa   - continue coreg     Severe sepsis, questionable   Although met SIRS criteria, CT chest with no consolidation, urine culture NGTD, blood cultures have been negative   - has completed 7 day course of antibiotics, will discontinue today     Anion gap metabolic acidosis, resolved   Most likely secondary to lactic acidosis from metformin rather than hypoperfusion from low blood pressure/infection   - consider discontinuing metformin at discharge     Hypertension, controlled   - continue PO medications     History of alcohol abuse   - no withdrawal symptoms in the past 48 hours  - will discontinue ativan     DM type 2, non insulin dependent  Hemoglobin A1c 6.9 08/27/2019   - will change to medium dose SSI with high sugars in setting of steroid use   - accu checks  - carb control diet  - hypoglycemia protocol      Code Status: Full Code   FEN: DIET GENERAL; Carb Control: 4 carb choices (60 gms)/meal; Low Sodium (2 GM)  PPx: pradaxa   Dispo: Floor    Patient will be discussed with attending, MD Josey Manjarrez, PGY-3

## 2019-09-04 NOTE — PLAN OF CARE
Problem: Nutrition  Goal: Optimal nutrition therapy  Outcome: Ongoing     Problem: Falls - Risk of:  Goal: Will remain free from falls  Description  Will remain free from falls  Outcome: Ongoing  Goal: Absence of physical injury  Description  Absence of physical injury  Outcome: Ongoing   Fall risk assessment completed every shift. All precautions in place. Pt has call light within reach at all times. Room clear of clutter. Pt aware to call for assistance when getting up. Bed locked in lowest position, alarm engaged, call light within reach, will continue to monitor. Problem: Risk for Impaired Skin Integrity  Goal: Tissue integrity - skin and mucous membranes  Description  Structural intactness and normal physiological function of skin and  mucous membranes. Outcome: Ongoing   Skin assessment completed every shift. Pt assessed for incontinence, appropriate barrier cream applied prn. Pt encouraged to turn/rotate every 2 hours. Assistance provided if pt unable to do so themselves. Problem: Infection:  Goal: Will remain free from infection  Description  Will remain free from infection  Outcome: Ongoing     Problem: Pain:  Goal: Pain level will decrease  Description  Pain level will decrease  Outcome: Ongoing  Goal: Control of acute pain  Description  Control of acute pain  Outcome: Ongoing  Goal: Control of chronic pain  Description  Control of chronic pain  Outcome: Ongoing   Pain/discomfort being managed with PRN analgesics per MD orders. Pt able to express presence and absence of pain and rate pain appropriately using numerical scale.     Problem: Breathing Pattern - Ineffective:  Goal: Ability to achieve and maintain a regular respiratory rate will improve  Description  Ability to achieve and maintain a regular respiratory rate will improve  Outcome: Ongoing     Problem: ACTIVITY INTOLERANCE/IMPAIRED MOBILITY  Goal: Mobility/activity is maintained at optimum level for patient  Outcome: Ongoing   Assess

## 2019-09-04 NOTE — PLAN OF CARE
Problem: Nutrition  Goal: Optimal nutrition therapy  9/4/2019 0022 by Tika Jordan RN  Outcome: Ongoing     Problem: Falls - Risk of:  Goal: Will remain free from falls  Description  Will remain free from falls  9/4/2019 0022 by Tika Jordan RN  Outcome: Ongoing     Problem: Falls - Risk of:  Goal: Absence of physical injury  Description  Absence of physical injury  9/4/2019 0022 by Tika Jordan RN  Outcome: Ongoing     Problem: Risk for Impaired Skin Integrity  Goal: Tissue integrity - skin and mucous membranes  Description  Structural intactness and normal physiological function of skin and  mucous membranes.   9/4/2019 0022 by Tika Jordan RN  Outcome: Ongoing     Problem: Infection:  Goal: Will remain free from infection  Description  Will remain free from infection  9/4/2019 0022 by Tika Jordan RN  Outcome: Ongoing     Problem: Safety:  Goal: Free from accidental physical injury  Description  Free from accidental physical injury  9/4/2019 0022 by Tika Jordan RN  Outcome: Ongoing     Problem: Safety:  Goal: Free from intentional harm  Description  Free from intentional harm  9/4/2019 0022 by Tika Jordan RN  Outcome: Ongoing     Problem: Pain:  Goal: Pain level will decrease  Description  Pain level will decrease  9/4/2019 0022 by Tika Jordan RN  Outcome: Ongoing     Problem: Pain:  Goal: Control of acute pain  Description  Control of acute pain  9/4/2019 0022 by Tika Jordan RN  Outcome: Ongoing     Problem: Pain:  Goal: Control of chronic pain  Description  Control of chronic pain  9/4/2019 0022 by Tika Jordan RN  Outcome: Ongoing     Problem: Breathing Pattern - Ineffective:  Goal: Ability to achieve and maintain a regular respiratory rate will improve  Description  Ability to achieve and maintain a regular respiratory rate will improve  9/4/2019 0022 by Tika Jordan RN  Outcome: Ongoing     Problem: ACTIVITY INTOLERANCE/IMPAIRED MOBILITY  Goal: Mobility/activity is maintained at optimum level for patient  9/4/2019 0022 by Jorge Aguilar RN  Outcome: Ongoing     Problem: OXYGENATION/RESPIRATORY FUNCTION  Goal: Patient will maintain patent airway  9/4/2019 0022 by Jorge Aguilar RN  Outcome: Ongoing     Problem: OXYGENATION/RESPIRATORY FUNCTION  Goal: Patient will achieve/maintain normal respiratory rate/effort  Description  Respiratory rate and effort will be within normal limits for the patient  9/4/2019 0022 by Jorge Aguilar RN  Outcome: Ongoing     Problem: HEMODYNAMIC STATUS  Goal: Patient has stable vital signs and fluid balance  9/4/2019 0022 by Jorge Aguilar RN  Outcome: Ongoing     Problem: FLUID AND ELECTROLYTE IMBALANCE  Goal: Fluid and electrolyte balance are achieved/maintained  9/4/2019 0022 by Jorge Aguilar RN  Outcome: Ongoing

## 2019-09-04 NOTE — PROGRESS NOTES
Tang Cabrera in the last 72 hours. AFB:No results for input(s): AFBSMEAR in the last 72 hours. Urine Culture  No results for input(s): LABURIN in the last 72 hours. RADIOLOGY:    XR KNEE LEFT (1-2 VIEWS)   Final Result   Suspected suprapatellar joint effusion. No acute or focal bony abnormality. CT LUMBAR SPINE WO CONTRAST   Final Result   Mild moderate degenerative changes of the lumbar spine, greatest at L4-5 and   L5-S1. There is mild central canal narrowing as well as mild-to-moderate bilateral   neural foraminal narrowing at L4-5. Moderate right pleural effusion with right basilar airspace disease. CT HEAD WO CONTRAST   Final Result   No acute intracranial abnormality      Atrophy and mild periventricular white matter disease, likely due to   small-vessel ischemic change         XR CHEST PORTABLE   Final Result   Findings as above likely related to worsening edema. XR CHEST PORTABLE   Final Result   Persistent opacification right lung base shown on recent CT to represent   rounded atelectasis and right pleural fluid. Stable appearing chest         CT CHEST PULMONARY EMBOLISM W CONTRAST   Final Result   1. Negative for pulmonary embolus. 2. Space dist related pleural disease. 3. Multifocal right-sided round atelectasis with small parapneumonic   effusion. The overall appearance is unchanged from 2017. XR CHEST PORTABLE   Final Result   Stable appearing chest without evidence of acute process and chronic changes   about the right lower lung field/costophrenic angle as described.              CONSULTS:    IP CONSULT TO HOSPITALIST  IP CONSULT TO SOCIAL WORK  IP CONSULT TO DIABETES EDUCATOR  IP CONSULT TO CARDIOLOGY    ASSESSMENT AND PLAN:      Principal Problem:    Severe sepsis ([facility] [address]  Active Problems:    Type 2 diabetes mellitus with diabetic polyneuropathy, without long-term current use of insulin (HCC)    Atrial fibrillation ([address]    Essential

## 2019-09-04 NOTE — PROGRESS NOTES
Speech Language Pathology  [facility]   Speech Therapy  Daily Dysphagia Treatment Note    Carson Bynum  AGE: 75 y.o. GENDER: male  : 1945  [id number]  EPISODE DATE:  2019     Patient Active Problem List   Diagnosis    Type 2 diabetes mellitus with diabetic polyneuropathy, without long-term current use of insulin ([address]    Atrial fibrillation ([address]    LVH (left ventricular hypertrophy)    Pulmonary hypertension ([address]    Atrial fibrillation with rapid ventricular response (HCC)    Essential hypertension    Benign neoplasm of colon    Gout    Mixed hyperlipidemia    Claudication ([address]    Sepsis ([address]    Severe sepsis ([address]    High anion gap metabolic acidosis    Acute respiratory failure with hypoxia (HCC)    Acute pain of right knee     No Known Allergies     Treatment Diagnosis: Dysphagia     Chart review:   · BSE completed 19 with rec for regular solids/thin liquids with follow up for tolerance. Subjective:     Current diet   regular texture   thin liquids    Comments regarding tolerating Current Diet:   Pt reports good PO tolerance. Pt with no dysphagia complaints       Objective:     Pain   Patient Currently in Pain: none stated    Cognitive/Behavior   Behavior/Cognition: Alert, Cooperative, Pleasant mood    Presentations   Consistencies Administered: Thin - straw, politely but consistently declined solids    Positioning   Upright in bed    PO Trials:  · Thin Liquids: pt tolerated without overt s/s of penetration or aspiration via cup and straw  · Regular food : NA    Dysphagia Tx:   PO trials:  Appears to be safely tolerating thin. Will re-attempt f/u x1 at a later date to confirm solid tolerance. Comp strats: independent\    Goals:   Dysphagia Goals:    The patient will tolerate recommended diet without observed clinical signs of aspiration ongoing; appears met  The patient/caregiver will demonstrate understanding of compensatory strategies for

## 2019-09-05 PROBLEM — M17.0 ARTHRITIS OF BOTH KNEES: Status: ACTIVE | Noted: 2019-01-01

## 2019-09-05 PROBLEM — R53.81 DEBILITY: Status: ACTIVE | Noted: 2019-01-01

## 2019-09-05 NOTE — DISCHARGE SUMMARY
Hospital Medicine Discharge Summary      Patient ID: Diann Tong      Patient's PCP: Arleen Patel DO    Admit Date: 8/27/2019     Discharge Date: 9/5/2019  The patient was seen and examined on day of discharge and this discharge summary is in conjunction with any daily progress note from day of discharge. Admitting Physician: Alberto Parikh MD    Discharge Physician: Myriam Garcia MD     Admitted for   Chief Complaint   Patient presents with    Extremity Weakness     pt brought in by EMS for weakness. pt states he started feeling weak about 3 hours ago. Admitting Diagnosis Severe sepsis ([facility] [address] [A41.9, R65.20]    Discharge Diagnoses: Active Hospital Problems    Diagnosis Date Noted    Arthritis of both knees [M17.0] 09/05/2019    Acute pain of right knee [M25.561]     High anion gap metabolic acidosis [H46.6] 08/27/2019    Acute respiratory failure with hypoxia (HCC) [J96.01] 08/27/2019    Essential hypertension [I10] 06/13/2017    Atrial fibrillation ([facility] [address] [I48.91] 05/04/2015    Type 2 diabetes mellitus with diabetic polyneuropathy, without long-term current use of insulin ([facility] [address] [E11.42] 06/29/2011       Follow Up: Primary Care Physician in one week    PCP to Follow up on   BP control        Hospital Course:     Patient is a 41-year-old male with a past medical history of A. fib, CHF, hypertension, hyperlipidemia who presented to [facility] with shortness of breath cough and was found to have an oxygen saturation of 86%.  He was admitted with possible sepsis and possible pneumonia.  Patient was also found to have uncontrolled hypertension and was started on a Cardene drip on admission.  That has been switched to oral blood pressure medications with good blood pressure control. No clear focus of infection was found. Patient was treated for possible pneumonia. His CT chest showed no pneumonia and his urine showed no infection.   Patient did have bilateral knee pain with 4.6 08/28/2019    CL 97 09/05/2019    CO2 27 09/05/2019    BUN 35 09/05/2019    CREATININE 1.1 09/05/2019           Discharge Medications:    Chikis Sanchez   Home Medication Instructions [facility]:[id number]    Printed on:09/05/19 1067   Medication Information                      amLODIPine (NORVASC) 5 MG tablet  Take 1 tablet by mouth daily             carvedilol (COREG) 25 MG tablet  Take 1 tablet by mouth 2 times daily             cloNIDine (CATAPRES) 0.2 MG tablet  Take 1 tablet by mouth 3 times daily             dabigatran (PRADAXA) 150 MG capsule  Take 1 capsule by mouth 2 times daily             folic acid (FOLVITE) 1 MG tablet  Take 1 tablet by mouth daily             furosemide (LASIX) 20 MG tablet  2 tabs in the morning and 1 tab in the late afternoon             guaiFENesin-dextromethorphan (ROBITUSSIN DM) 100-10 MG/5ML syrup  Take 5 mLs by mouth every 4 hours as needed for Cough             lisinopril (PRINIVIL;ZESTRIL) 20 MG tablet  Take 1 tablet by mouth daily             metFORMIN (GLUCOPHAGE) 500 MG tablet  TAKE ONE TABLET BY MOUTH TWICE A DAY WITH MEALS             predniSONE (DELTASONE) 20 MG tablet  Take 2 tablets by mouth daily for 1 day             simvastatin (ZOCOR) 20 MG tablet  Take 1 tablet by mouth nightly             vitamin B-1 100 MG tablet  Take 1 tablet by mouth daily                 No future appointments. Time Spent on discharge is more than 45 minutes in the examination, evaluation, counseling and review of medications and discharge plan. Signed:  Rommel Martin MD   9/5/2019    The note was completed using EMR. Every effort was made to ensure accuracy; however, inadvertent computerized transcription errors may be present. Thank you Beth Hughes DO for the opportunity to be involved in this patient's care. If you have any questions or concerns please feel free to contact me at [phone].

## 2019-09-05 NOTE — PROGRESS NOTES
Progress Note  Admit Date: 2019      PCP: Kathi Rg DO     CC: F/U for fever    SUBJECTIVE / Interval History:   Pt feels ok, has some SOB   Wt 196 > 194    I/o  Neg 5 L         Allergies  Patient has no known allergies. Medications    Scheduled Meds:   thiamine  100 mg Oral Daily    insulin lispro  0-12 Units Subcutaneous TID WC    insulin lispro  0-6 Units Subcutaneous Nightly    predniSONE  40 mg Oral Daily    amLODIPine  5 mg Oral Daily    carvedilol  37.5 mg Oral BID    lisinopril  20 mg Oral Daily    cloNIDine  0.2 mg Oral TID    sodium chloride flush  10 mL Intravenous 2 times per day    dabigatran  150 mg Oral BID    simvastatin  20 mg Oral Nightly    folic acid  1 mg Oral Daily     Continuous Infusions:   dextrose         PRN Meds:  melatonin, guaiFENesin-dextromethorphan, ipratropium-albuterol, acetaminophen, perflutren lipid microspheres, labetalol, hydrALAZINE, sodium chloride flush, magnesium hydroxide, ondansetron, glucose, dextrose, glucagon (rDNA), dextrose    Vitals    TEMPERATURE:  Current - Temp: 97.5 °F (36.4 °C); Max - Temp  Av.6 °F (36.4 °C)  Min: 97.4 °F (36.3 °C)  Max: 98 °F (36.7 °C)  RESPIRATIONS RANGE: Resp  Av.6  Min: 15  Max: 18  PULSE RANGE: Pulse  Av.8  Min: 69  Max: 78  BLOOD PRESSURE RANGE:  Systolic (42ANV), RMX:723 , Min:149 , DCJ:563   ; Diastolic (63HWP), IEO:88, Min:78, Max:100    PULSE OXIMETRY RANGE: SpO2  Av.3 %  Min: 94 %  Max: 98 %  24HR INTAKE/OUTPUT:      Intake/Output Summary (Last 24 hours) at 2019 0942  Last data filed at 2019 0600  Gross per 24 hour   Intake 300 ml   Output 1225 ml   Net -925 ml       Exam:    Gen: No distress. Eyes: PERRL. No sclera icterus. No conjunctival injection. ENT: No discharge. Pharynx clear. External appearance of ears and nose normal.  Neck: Trachea midline. No obvious mass. Resp: No accessory muscle use. No crackles. No wheezes. few  +  rhonchi. No dullness on percussion.   CV: NUMCELLSCSF, LYMPHSCSF, MONOCSF, GLUCCSF, VOLCSF in the last 72 hours. Respiratory Culture:  Recent Labs     09/03/19  1220   LABGRAM No Epithelial Cells seen  3+ WBC's (Polymorphonuclear)  No organisms seen       AFB:No results for input(s): AFBSMEAR in the last 72 hours. Urine Culture  No results for input(s): LABURIN in the last 72 hours. RADIOLOGY:    XR CHEST PORTABLE   Final Result   Increasing airspace change and worsening right pleural effusion. Pattern may   represent pneumonia or asymmetric pulmonary edema. XR KNEE LEFT (1-2 VIEWS)   Final Result   Suspected suprapatellar joint effusion. No acute or focal bony abnormality. CT LUMBAR SPINE WO CONTRAST   Final Result   Mild moderate degenerative changes of the lumbar spine, greatest at L4-5 and   L5-S1. There is mild central canal narrowing as well as mild-to-moderate bilateral   neural foraminal narrowing at L4-5. Moderate right pleural effusion with right basilar airspace disease. CT HEAD WO CONTRAST   Final Result   No acute intracranial abnormality      Atrophy and mild periventricular white matter disease, likely due to   small-vessel ischemic change         XR CHEST PORTABLE   Final Result   Findings as above likely related to worsening edema. XR CHEST PORTABLE   Final Result   Persistent opacification right lung base shown on recent CT to represent   rounded atelectasis and right pleural fluid. Stable appearing chest         CT CHEST PULMONARY EMBOLISM W CONTRAST   Final Result   1. Negative for pulmonary embolus. 2. Space dist related pleural disease. 3. Multifocal right-sided round atelectasis with small parapneumonic   effusion. The overall appearance is unchanged from 2017. XR CHEST PORTABLE   Final Result   Stable appearing chest without evidence of acute process and chronic changes   about the right lower lung field/costophrenic angle as described.              CONSULTS:    EUGENE

## 2019-09-06 NOTE — CARE COORDINATION
visit within 7 days post discharge.     [facility]     Case Management   [phone]    9/6/2019  4:04 PM

## 2019-09-06 NOTE — PLAN OF CARE
Problem: Falls - Risk of:  Goal: Will remain free from falls  Description  Will remain free from falls  Outcome: Ongoing  Note:   Fall precautions in place. Henao score: 55. Pt transfers with stedy x1. Bed locked in lowest position, nonskid socks on, bed/chair alarm active. Call light and belongings within reach. Continue hourly rounding. Problem: Risk for Impaired Skin Integrity  Goal: Tissue integrity - skin and mucous membranes  Description  Structural intactness and normal physiological function of skin and  mucous membranes. Outcome: Ongoing  Note:   Skin assessment performed. Jim score of 17. Encouraging and assisted with repositioning as needed. Heels elevated while in chair/bed. Use of waffle cushion while up to chair. Mepilex applied to bilateral heels. Problem: ACTIVITY INTOLERANCE/IMPAIRED MOBILITY  Goal: Mobility/activity is maintained at optimum level for patient  Outcome: Ongoing  Note:   Working with PT/OT for 3 hours/day to increase mobility and strength. VSS on room air. Denies SOB or chest pain. Problem: Acute Pain  Goal: Control of acute pain  Description  Control of acute pain     Outcome: Ongoing  Note:   Pt. able to communicate pain needs based on 0-10 pain scale. Non-pharmacological interventions, such as repositioning and rest encouraged, and PRN pain medications administered as needed. Continue to monitor and assess.

## 2019-09-06 NOTE — PLAN OF CARE
hypertension and was started on a Cardene drip on admission.  That has been switched to oral blood pressure medications with good blood pressure control. He had both knees injected with cortisone with improvement. He was started therapies, but needs more therapy before discharge to home safely. He does live at home with his wife in a condo. He has been cleared for rehab unit admission today. He is agreeable to rehab unit treatment.         I have reviewed this initial plan of care and agree with its contents:    Title   Name    Date    Time    Physician: Dr. González Sorensen, 9/6/19, 1 pm    Case Mgmt:  Jfefery Sheetsels, [employer]     Case Management   [phone]    9/6/2019  4:00 PM      OT:  Dean Osuna, OTR/L #[id number]    PT: Electronically signed by Jayce Walker, [facility] on 9/6/19 at 12:02 PM      RN: Prince Juares RN, [address] [place] [address] 09/06/2019 2:56 pm    ST:    :  TASHA Sierra  9/7/2019  1057    Other:

## 2019-09-06 NOTE — PROGRESS NOTES
was present briefly. Pt agreeable for dynamometer & 9 hole peg testing (see bold section below). Also wanted to change his pants d/t being \"too tight\"--OT instructed pt how to use reacher to doff pants, he stood w/ min A but required A to manage his pants down over hips. Pt instructed to place L foot into pants first but needed guidance to use reacher, stood again w/ min A and required help w/ clothing over buttocks. OT offered to return pt to recliner or bed to rest however he wanted to remain up in w/c. Pt expecting his son to visit. Call light in reach, chair alarm on. Tx time: 40 minutes, Cont w/ POC Adore Neville, OTR/L #[id number]    Patient Diagnosis(es): There were no encounter diagnoses. has a past medical history of Atrial fibrillation ([facility] [address] CHF (congestive heart failure) ([facility] [address] Hyperlipidemia, Hypertension, and Type II or unspecified type diabetes mellitus without mention of complication, not stated as uncontrolled. has a past surgical history that includes colectomy (2009). Treatment Diagnosis: impaired ADLs, t/f, strength, balance      Restrictions  Restrictions/Precautions  Restrictions/Precautions: Fall Risk, Seizure  Position Activity Restriction  Other position/activity restrictions:  chronic L knee pain ( recent cortisone injection), low sodium diet    Subjective   General  Chart Reviewed: Yes  Patient assessed for rehabilitation services?: Yes  Additional Pertinent Hx: per MD note, Jeanine Carrizales is a 75 y.o. male who presents to the ED for extremity weakness and hypoxemia. Patient states he attempted to get out of bed approximately 3 hours prior to arrival and he ended up sliding to the floor. Did not hit his head. He laid on the floor for up to 3 hours he could not get up on his own and he was extremely weak. His wife is unable to get him up. Initially he refused to let her call the ambulance but finally after 3 hours he allowed him to call the ambulance.   They reported his Norm[employer]  Coordination  Movements Are Fluid And Coordinated: No  Quality of Movement Other  Comment: shakiness noted in UEs     Bed mobility  Rolling to Right: Contact guard assistance  Supine to Sit: Minimal assistance  Comment: hosptial bed, used rails  Transfers  Sit to stand: Minimal assistance  Stand to sit: Minimal assistance  Transfer Comments: heavy min A from elevated surfaces, needs extra time, fearful of falling     Cognition  Following Commands:  Follows all commands without difficulty  Attention Span: Appears intact  Memory: Appears intact  Safety Judgement: Decreased awareness of need for assistance  Problem Solving: Assistance required to identify errors made;Assistance required to implement solutions;Assistance required to generate solutions  Insights: Decreased awareness of deficits  Cognition Comment: pt fearful of falling, becomes tearful when talking about his family        Sensation  Overall Sensation Status: WFL        LUE AROM (degrees)  LUE AROM : WFL  Left Hand AROM (degrees)  Left Hand AROM: Exceptions  Left Hand General AROM: trigger finger digit 4  RUE AROM (degrees)  RUE AROM : WFL  Right Hand AROM (degrees)  Right Hand AROM: Exceptions  Right Hand General AROM: mild claw hand, start of trigger finger digit 4  LUE Strength  Gross LUE Strength: Exceptions to [facility]  L Shoulder Flex: 4-/5  L Hand General: 4/5  RUE Strength  Gross RUE Strength: Exceptions to [place] [place]  R Shoulder Flex: 4-/5  R Hand General: 4/5     Hand Dominance--completed during PM session  Hand Dominance: Right  Left Hand Strength -  (lbs)  Handle Setting 2: 38,36, 35--average: 36 lbs  LUE Edema - Circumference (cm)  LUE Edema Present?: No  Left 9-Hole Peg Test  Left 9-Hole Peg Test: Functional  Right Hand Strength -  (lbs)  Handle Setting 2: 20,27,58--QQTYRSL: 47 lbs  RUE Edema - Circumference (cm)  RUE Edema Present?: No  Right 9-Hole Peg Test  Right 9-Hole Peg Test: Functional  Fine Motor Skills  Left 9-Hole Peg Test:

## 2019-09-06 NOTE — PROGRESS NOTES
Physical Therapy    Facility/Department: [address] IP REHAB  Initial Assessment    NAME: Robby Arroyo  : 1945  MRN: [id number]    Date of Service: 2019    Discharge Recommendations:  Home with assist PRN, Home with Home health PT, S Level 1, Patient would benefit from continued therapy after discharge   PT Equipment Recommendations  Other: will continue to monitor for equip needs; pt may need RW    Assessment   Body structures, Functions, Activity limitations: Decreased functional mobility ; Decreased endurance;Decreased ROM; Decreased strength;Decreased balance  Assessment: Anton Torres MD \"Patient is a 69-year-old male with a past medical history of A. fib, CHF, hypertension, hyperlipidemia who presented to [facility] with shortness of breath, cough and was found to have an oxygen saturation of 86%. He was admitted with possible sepsis and possible pneumonia. Patient was also found to have uncontrolled hypertension and was started on a Cardene drip on admission. That has been switched to oral blood pressure medications with good blood pressure control. He had both knees injected with cortisone with improvement. He was started therapies, but needs more therapy before discharge to home safely. He does live at home with his wife in a condo. \"  Pt has 15 [address] to his condo and was indep ambulating without an AD PTA. He presents with functional mobility below baseline and currently requires up to Ness for bed mobility, up to mod A for transfers and CGA ambulating 175' with RW. He is most limited by L knee pain and decreased strength. Pt will benefit from skilled PT to improve safety and independence with functional mobility prior to returning home with assist PRN from wife. Anticipate 7-10 days on ARU.   Treatment Diagnosis: difficulty walking  Specific instructions for Next Treatment: gait and transfers  Prognosis: Good  Decision Making: Medium Complexity  History: Anton Torres MD \"Patient is a 69-year-old male : Yes(wife drives too)  Mode of Transportation: Car([employer])  Occupation: Retired  Type of occupation: sheet metal design  Leisure & Hobbies: used to play golf  IADL Comments: sleep in a regular bed at home  Additional Comments: has 7 grandkids, 1 great grandchild; pt denies falls other than sliding off bed prior to hospital admission; wife does not work and is in good health       Objective     Observation/Palpation  Observation: bruising noted L arm    AROM RLE (degrees)  RLE AROM: WFL  RLE General AROM: except hip flex limited by weakness  PROM LLE (degrees)  LLE General PROM: WFL except knee ext limited by approx 10 deg  AROM LLE (degrees)  LLE General AROM: ankle WFL, knee flex/ext limited by pain, hip flex limited by weakness  AROM RUE (degrees)  RUE AROM : WFL  AROM LUE (degrees)  LUE AROM : WFL  Strength RLE  Comment: hip flex 3-/5, knee ext/flex 4/5, ankle DF 4/5  Strength LLE  Comment: hip flex 3-/5, knee ext/flex 2/5 (limited by pain), ankle DF 4/5  Tone RLE  RLE Tone: Normotonic  Tone LLE  LLE Tone: Normotonic  Motor Control  Gross Motor?: WFL  Sensation  Overall Sensation Status: WFL     Bed mobility  Rolling to Left: Stand by assistance  Rolling to Right: Stand by assistance  Supine to Sit: Contact guard assistance  Sit to Supine: Minimal assistance  Scooting: Stand by assistance  Comment: flat therapy mat, no rails; extra time due to L knee pain; dizziness after sup to sit that resolved with seated rest     Transfers  Sit to Stand: Minimal Assistance; Moderate Assistance  Stand to sit: Contact guard assistance  Car Transfer:  Moderate Assistance(Ness stand to sit; Ness to bring LLE out of car, modA sit to stand; VCs for hand placement)  Comment: increased time; cues for hand placement     Ambulation  Ambulation?: Yes  More Ambulation?: No  Ambulation 1  Surface: level tile  Device: Rolling Walker  Assistance: Contact guard assistance  Quality of Gait: decreased foot clearance barry and decreased

## 2019-09-06 NOTE — H&P
to sit: Minimal Assistance; Moderate Assistance;2 Person Assistance  Ambulation  Ambulation?: Yes  Ambulation 1  Surface: level tile  Device: Rolling Walker  Assistance: Minimal assistance    OT:   ADL  LE Dressing: Maximum assistance(assist to don/doff brief; assist to thread bilateral LE into briefs; assist to pull over hips; assist to don/doff bilateral socks and EUGENE hose; Min Ax1 and RW)  Toileting: Moderate assistance(assist for pericare in stance at EOB)  Balance  Sitting Balance: Supervision(seated EOB)  Standing Balance: Minimal assistance(RW)  Functional Mobility  Functional - Mobility Device: Rolling Walker  Activity: Other(household distances in hallway)    SLP:   Dysphagia Diagnosis: Resolved pharyngeal stage dysphagia        has a past medical history of Atrial fibrillation ([facility] CHF (congestive heart failure) ([facility] Hyperlipidemia, Hypertension, and Type II or unspecified type diabetes mellitus without mention of complication, not stated as uncontrolled. reports that he has quit smoking. He has a 22.50 pack-year smoking history. He has never used smokeless tobacco. He reports that he drinks alcohol. He reports that he does not use drugs. family history includes Cancer in his father. Prior to Admission medications    Medication Sig Start Date End Date Taking?  Authorizing Provider   cloNIDine (CATAPRES) 0.2 MG tablet Take 1 tablet by mouth 3 times daily 9/5/19  Yes Ashlie Borja MD   lisinopril (PRINIVIL;ZESTRIL) 20 MG tablet Take 1 tablet by mouth daily 9/6/19  Yes Ashlie Borja MD   amLODIPine (NORVASC) 5 MG tablet Take 1 tablet by mouth daily 9/6/19  Yes Ashlie Borja MD   predniSONE (DELTASONE) 20 MG tablet Take 2 tablets by mouth daily for 1 day 9/6/19 9/7/19 Yes Ashlie Borja MD   furosemide (LASIX) 20 MG tablet 2 tabs in the morning and 1 tab in the late afternoon  Patient taking differently: 2 tabs in the morning 7/30/18  Yes Juvencio Beatty DO carvedilol (COREG) 25 MG tablet Take 1 tablet by mouth 2 times daily 6/30/15  Yes Chelsey Copeland MD   dabigatran (PRADAXA) 150 MG capsule Take 1 capsule by mouth 2 times daily 6/30/15  Yes Chelsey Copeland MD   metFORMIN (GLUCOPHAGE) 500 MG tablet TAKE ONE TABLET BY MOUTH TWICE A DAY WITH MEALS 6/18/15  Yes Tiburcio Troncoso DO   simvastatin (ZOCOR) 20 MG tablet Take 1 tablet by mouth nightly 6/9/15  Yes Tiburcio Troncoso DO   guaiFENesin-dextromethorphan (ROBITUSSIN DM) 100-10 MG/5ML syrup Take 5 mLs by mouth every 4 hours as needed for Cough 9/5/19 9/15/19  Ashlie Borja MD   folic acid (FOLVITE) 1 MG tablet Take 1 tablet by mouth daily 9/6/19   Ashlie Borja MD   vitamin B-1 100 MG tablet Take 1 tablet by mouth daily 9/6/19   Ashlie Borja MD         REVIEW OF SYSTEMS:   CONSTITUTIONAL: negative for fevers, chills, diaphoresis, activity change, appetite change, fatigue, night sweats and unexpected weight change. EYES: negative for blurred vision, eye discharge, visual disturbance and icterus. HEENT: negative for hearing loss, tinnitus, ear drainage, sinus pressure, nasal congestion, epistaxis and snoring. RESPIRATORY: Negative for hemoptysis, cough, sputum production  CARDIOVASCULAR: negative for chest pain, palpitations, exertional chest pressure/discomfort, edema, syncope. + A. fib, CHF  GASTROINTESTINAL: negative for nausea, vomiting, diarrhea, constipation, blood in stool and abdominal pain. GENITOURINARY: negative for frequency, dysuria, urinary incontinence, decreased urine volume, and hematuria. HEMATOLOGIC/LYMPHATIC: negative for easy bruising, bleeding and lymphadenopathy. ALLERGIC/IMMUNOLOGIC: negative for recurrent infections, angioedema, anaphylaxis and drug reactions.    ENDOCRINE: negative for weight changes and diabetic symptoms including polyuria, polydipsia and polyphagia. + Diabetes  MUSCULOSKELETAL: negative for pain, joint swelling, decreased range

## 2019-09-06 NOTE — PLAN OF CARE
Problem: OXYGENATION/RESPIRATORY FUNCTION  Goal: Patient will achieve/maintain normal respiratory rate/effort  Description  Respiratory rate and effort will be within normal limits for the patient  Note:   Denies SOB, slight cough with small amount of frothy sputum. MD aware and is following. Will continue to ensure patients respiratory function remains WNL for patient. Problem: ACTIVITY INTOLERANCE/IMPAIRED MOBILITY  Goal: Mobility/activity is maintained at optimum level for patient  9/6/2019 1005 by Jenelle Zepeda RN  Note:   Patient working with therapy at least 3 hours/day to obtain maximal mobility. Safety devices used.

## 2019-09-07 NOTE — PLAN OF CARE
Problem: Falls - Risk of:  Goal: Will remain free from falls  Description  Will remain free from falls  9/6/2019 2338 by Brent Rodrigues RN  Outcome: Ongoing  Note:   Fall risk band on patient. Orange light on outside of room. Non skid footwear in place. Alarms used appropriately. Patient instructed to call and wait for staff before getting up. Rounding done to anticipate needs. Appropriate safety devices used for transfers. Problem: Risk for Impaired Skin Integrity  Goal: Tissue integrity - skin and mucous membranes  Description  Structural intactness and normal physiological function of skin and  mucous membranes. 9/6/2019 2338 by Brent Rodrigues RN  Outcome: Ongoing  Note:   Skin assessment completed on admission and every shift. Barrier wipes used in the event of incontinence. Pressure relief techniques used as needed while in chair and in bed. Position changes encouraged at least every two hours while in bed. Problem: Acute Pain  Goal: Control of acute pain  Description  Control of acute pain     9/6/2019 2338 by Brent Rodrigues RN  Outcome: Ongoing  Note:   Patient able to express pain and rate pain using pain scale. Medicate as needed per orders. Reassess patient pain level within one hour after oral pain medication/intervention and 30 minutes after IV pain medication to assure patient has reduced pain sensation and document outcome. Non pharmaceutical interventions as appropriate.

## 2019-09-07 NOTE — PROGRESS NOTES
pt will complete:   Short term goal 1: UB dressing IND  Short term goal 2: LB dressing w/ MI using A/E prn  Short term goal 3: standing tolerance x 5-8 minutes w/ MI during ADLs/IADLs  Short term goal 4: household t/f w/ MI using RW  Short term goal 5: shower level bathing w/ SBA using shower chair + GB and LH sponge  Long term goals  Time Frame for Long term goals : by 7-10 days pt will complete  Long term goal 1: Feeding & grooming IND'ly  Long term goal 2: toilet tasks w/ MI  Long term goal 3: simple cooking tasks w/ MI using RW  Long term goal 4: address DME & A/E including family training prior to DC (may need shower chair, GB, TSF, R, SA--can get [facility]?)  Long term goal 5: UE exercises to increase strength to 4+/5 to be IND w/ sit<>stand transitions  Patient Goals   Patient goals : \"get strong enough to go up steps; get my strength back\"       Therapy Time   Individual Concurrent Group Co-treatment   Time In 0710         Time Out 0840         Minutes 90                 Electronically signed by KRISTA [id number] on 9/7/2019 at 8:36 AM

## 2019-09-07 NOTE — PROGRESS NOTES
Physical Therapy  Facility/Department: [address] IP REHAB  Daily Treatment Note  NAME: Albin Burger  : 1945  MRN: [id number]    Date of Service: 2019    Discharge Recommendations:  Home with assist PRN, Home with Home health PT, S Level 1, Patient would benefit from continued therapy after discharge   PT Equipment Recommendations  Other: will continue to monitor for equip needs; pt may need RW    Assessment   Body structures, Functions, Activity limitations: Decreased functional mobility ; Decreased endurance;Decreased ROM; Decreased strength;Decreased balance  Assessment: Session limited by nausea and vomiting and pt tolerated very little activity this AM.  He was able to participate in seated ther ex and continues to be limited by L knee pain. Session ended early due to vomiting. RN Ronny Barriga aware of vomiting. Pt will benefit from skilled PT to improve safety and independence with functional mobility prior to returning home with assist PRN from wife. Treatment Diagnosis: difficulty walking  Specific instructions for Next Treatment: gait and transfers  Prognosis: Good  PT Education: PT Role;Plan of Care  REQUIRES PT FOLLOW UP: Yes  Activity Tolerance  Activity Tolerance: Patient limited by pain; Other  Activity Tolerance: limited by nausea and vomiting     Patient Diagnosis(es): There were no encounter diagnoses. has a past medical history of Atrial fibrillation ([address] CHF (congestive heart failure) ([address] Hyperlipidemia, Hypertension, and Type II or unspecified type diabetes mellitus without mention of complication, not stated as uncontrolled. has a past surgical history that includes colectomy ().     Restrictions  Restrictions/Precautions  Restrictions/Precautions: Fall Risk, Seizure  Position Activity Restriction  Other position/activity restrictions:  chronic L knee pain ( recent cortisone injection), low sodium diet     Social/Functional History  Lives With: Spouse(Yoly Han)  Type of Home:

## 2019-09-08 NOTE — PROGRESS NOTES
In to see pt, when inquiring about history of bowel movements and what laxitives he take at home, wife states he had 90% of his colon removed and with only 10% of his colon present he takes something at home to make him stop having so may episodes. She was unable to recall the name of the medication and did not know his history of movements prior to or during this occurrence to the hospital. Dating prior to 8/27/2019 and to the present. Wife agrees that it is abnormal for him to only have had 2 BM's since 8/27/2019 as the pt usually goes everyday. Call into Dr. Mary Romano at his request d/t HR continuing to stay above 120 bmp, update given according to information above and current status, received orders for CBC with auto diff, BMP with reflex to Mg+, and KUB, all ordered stat. Call in to radiology with request to perform KUB at bedside d/t MEWS of 4 with uncontrolled AFib, request accepted. Call in to Clinical , updated and will periodically observe results as able. At this time, KUB completed at bedside and labs drawn. Awaiting results. Will continue to monitor. Electronically signed by Mauricio Goetz RN on 9/8/2019 at 9:49 AM    Call in from [facility], reporting the results of the KUB, Call in to Dr. Mary Romano, informed of results and WBC of 12.1, verbal order to make pt NPO and consult Hospitalist.     Call in to Hospitalist, consult called. Electronically signed by Mauricio Goetz RN on 9/8/2019 at 10:05 AM     Call in to Clinical , in to assess pt, stat EKG ordered and placed, call in to Respiratory and will resopnd.  Electronically signed by Mauricio Goetz RN on 9/8/2019 at 10:31 AM    Respiratory in the room with Hospitalist. Electronically signed by Mauricio Goetz RN on 9/8/2019 at 10:42 AM

## 2019-09-08 NOTE — PROGRESS NOTES
0037: Pt pulse is 156, 113, 103 and BP is 193/95. Pt is still c/o N&V. Pt was unable to take pm po meds. Nurse called on call Dr. Andrew Ocampo. Dr ordered compazine, NS @ 125 and stat EKG. 0117: Dr. Andrew Ocampo notified of results, EKG showed A-Fib with RVR, PV or abruptly conducted complexes. Dr. Andrew Ocampo requested hospitalist consult for EKG results and uncontrolled vomiting.   0120: Hosptialist consulted. Awaiting call back  0200: Gladys Burnett, hospitalist called back. No new orders for patient. Monitor HR, while patient is sleeping. If it is above 120, call back. Pt is currently sleeping and relaxed without vomiting.  Electronically signed by Magaly Rocha RN on 9/8/2019 at 2:05 AM

## 2019-09-09 NOTE — PROGRESS NOTES
Department of [facility] Progress Note    Patient Identification:  Bernard Hernandes  [id number]  : 1945  Admit date: 2019      Diagnosis:   Patient Active Problem List   Diagnosis    Type 2 diabetes mellitus with diabetic polyneuropathy, without long-term current use of insulin ([facility]    Atrial fibrillation ([facility]    LVH (left ventricular hypertrophy)    Pulmonary hypertension ([facility]    Atrial fibrillation with rapid ventricular response (HCC)    Essential hypertension    Benign neoplasm of colon    Gout    Mixed hyperlipidemia    Claudication (HCC)    Sepsis ([facility]    Severe sepsis (HCC)    High anion gap metabolic acidosis    Acute respiratory failure with hypoxia (HCC)    Acute pain of right knee    Arthritis of both knees    Debility           Subjective: Pt seen this AM.  Patient did well over the weekend, with no problems noted. He is sleeping better at night with taking Benadryl medication. He is eating well without any problems, but would like his diet increased to a regular diet. We will put him on a carb controlled diet but continues fluid restrictions and restricted salt intake due to his CHF. We will work on improve his leg strength, endurance, and self-care activities before discharge.     BP (!) 151/85   Pulse 96   Temp 97.8 °F (36.6 °C) (Oral)   Resp 16   Ht 5' 9\" (1.753 m)   Wt 197 lb 15.6 oz (89.8 kg)   SpO2 90%   BMI 29.24 kg/m²     Last 24 hour lab  Recent Results (from the past 24 hour(s))   CBC Auto Differential    Collection Time: 19  9:39 AM   Result Value Ref Range    WBC 12.1 (H) 4.0 - 11.0 K/uL    RBC 3.79 (L) 4.20 - 5.90 M/uL    Hemoglobin 11.7 (L) 13.5 - 17.5 g/dL    Hematocrit 35.2 (L) 40.5 - 52.5 %    MCV 92.8 80.0 - 100.0 fL    MCH 30.8 26.0 - 34.0 pg    MCHC 33.2 31.0 - 36.0 g/dL    RDW 13.7 12.4 - 15.4 %    Platelets 465 253 - 413 K/uL    MPV 8.1 5.0 - 10.5 fL    Neutrophils % 87.2 %    Lymphocytes % 3.7 % Glucose 205 (H) 70 - 99 mg/dl    Performed on ACCU-CHEK        Therapy progress:  PT  Position Activity Restriction  Other position/activity restrictions:  chronic L knee pain ( recent cortisone injection), low sodium diet  Objective     Sit to Stand: Unable to assess  Stand to sit: Unable to assess  Device: Rolling Walker  Assistance: Contact guard assistance  Distance: 175' and multiple short distances in the therapy gym  OT  PT Equipment Recommendations  Other: will continue to monitor for equip needs; pt may need RW  Toilet - Technique: Ambulating  Equipment Used: Grab bars  Toilet Transfers Comments: min A in each direction, used rail                     Assessment and Plan:        Debility     Sepsis/SIRS-  Was treated for possible pneumonia, Patient finished 7 days of antibiotics.     HTN-Norvasc, Lasix, lisinopril, Catapres     HLD- Zocor      B knee pain with weakness- OA , injected with cortisone.      Diastolic CHF-mild exacerbation- Lasix, Coreg     A. Fib- rate controlled, Pradaxa     History of alcohol abuse/dependence-No signs of withdrawal, thiamine, folic acid     DM 2 - uncontrolled , sliding scale, resume metformin     Bowels: Schedule Miralax + Senna S. Follow bowel movements. Enema or suppository if needed.      Bladder: Check PVR x 3. [address] if PVR > 200ml or if any volume is > 500 ml.      Pain: Tylenol is ordered prn.        Hilario Teresa MD, 9/9/2019, 7:30 AM

## 2019-09-09 NOTE — PROGRESS NOTES
Resp: No accessory muscle use. No crackles. No wheezes. No rhonchi. No dullness on percussion. CV: Regular rate. Regular rhythm. No murmur or rub. + edema. GI: Non-tender. Non-distended. No hernia. Skin: Warm, dry, normal texture and turgor. No nodule on exposed extremities. Lymph: No cervical LAD. No supraclavicular LAD. M/S: No cyanosis. No clubbing. No joint deformity. Neuro: Moves all four extremities. CN 2-12 tested, no defect noted. Psych: Oriented x 3. No anxiety. Awake. Alert. Intact judgement and insight. Data    LABS  CBC:   Recent Labs     09/08/19  0939  09/08/19  1801 09/09/19  0044 09/09/19  0542   WBC 12.1*  --   --   --   --    HGB 11.7*   < > 12.7* 11.1* 11.9*   HCT 35.2*   < > 38.4* 34.2* 36.3*   MCV 92.8  --   --   --   --      --   --   --   --     < > = values in this interval not displayed. BMP:   Recent Labs     09/08/19  0939      K 4.0      CO2 28   BUN 33*   CREATININE 0.8   GLUCOSE 224*     POC GLUCOSE:    Recent Labs     09/08/19  1202 09/08/19  1658 09/08/19  2103 09/09/19  0712 09/09/19  1202   POCGLU 330* 192* 124* 205* 195*     LIVER PROFILE: No results for input(s): AST, ALT, LIPASE, AMYLASE, LABALBU, BILIDIR, BILITOT, ALKPHOS in the last 72 hours. PT/INR: No results for input(s): PROTIME, INR in the last 72 hours. APTT: No results for input(s): APTT in the last 72 hours. UA:No results for input(s): NITRITE, COLORU, PHUR, LABCAST, WBCUA, RBCUA, MUCUS, TRICHOMONAS, YEAST, BACTERIA, CLARITYU, SPECGRAV, LEUKOCYTESUR, UROBILINOGEN, BILIRUBINUR, BLOODU, GLUCOSEU, KETUA, AMORPHOUS in the last 72 hours. Microbiology:  Wound Culture: No results for input(s): WNDABS, ORG in the last 72 hours. Invalid input(s):  LABGRAM  Nasal Culture: No results for input(s): ORG, MRSAPCR in the last 72 hours. Blood Culture: No results for input(s): BC, BLOODCULT2 in the last 72 hours.   Fungal Culture:   No results for input(s): FUNGSM in the last 72

## 2019-09-09 NOTE — PLAN OF CARE
Problem: Falls - Risk of:  Goal: Will remain free from falls  Description  Will remain free from falls  9/9/2019 0211 by Jonatan Kimble RN  Outcome: Ongoing  Note:   Patient is a high fall risk. Patient free of falls this shift. Bed low, locked and alarmed at all times. Call light and bedside table is within reach. Arm band on wrist, fall light on outside of room, and nonskid socks are on patient. Notified patient to ask for assistance when needed. Patient verbalized understanding. Problem: Risk for Impaired Skin Integrity  Goal: Tissue integrity - skin and mucous membranes  Description  Structural intactness and normal physiological function of skin and  mucous membranes. Outcome: Ongoing  Note:   Patient is at risk for impaired skin integrity. Offered to turn and reposition patient every two hours and as needed. Skin is assessed every shift and prn. Patient has pillow support, to help reduce skin breakdown. Will continue to monitor. Problem: OXYGENATION/RESPIRATORY FUNCTION  Goal: Patient will achieve/maintain normal respiratory rate/effort  Description  Respiratory rate and effort will be within normal limits for the patient  9/8/2019 1335 by Kulwinder Gastelum RN  Note:   Patient oxygen level and respiratory function will be within normal limits ; assessment of lungs and oxygen saturation to be monitored     Problem: Acute Pain  Goal: Control of acute pain  Description  Control of acute pain     Outcome: Ongoing  Note:   Patient denies any pain at this time. Will continue to monitor.

## 2019-09-09 NOTE — PROGRESS NOTES
Patient was admitted on 9/5 for sepsis with acute respiratory failure with hypoxia. Patient alert and oriented x 4 . VSS. Patient's last bowel movement noted on September , 7 , 2019. Patient transfers x 1 with stedy. Patient aware of room and surroundings. Morning assessment complete. Patient educated on use of call light. Medication administered this morning with no complications. Patient current pain level status: denies, Interventions made as necessary: none needed at this time . The use of rest and other techniques specific to this patient are used as a means of non-pharmacological interventions for pain. Continuing education specific to the this patient is continue throughout stay. Bed/Chair alarm on  and call light within reach. Input and output being monitored along with daily weights. Patient has no further needs at this time. RN will continue to monitor.

## 2019-09-09 NOTE — PROGRESS NOTES
completed the task w/ CGA for very short distance. Completed w/c transfers w/ CGA in each direction, states \"my legs are weak\" but did not display any LOB. Discussed increasing protein intake to build muscle & energy--would like to try Glucerna (listed in pt's chart) also mentioned eating more chicken & fish. Tx time: 45 min, cont w/ POC Richie Koehler, OTR/L #[id number]    Patient Diagnosis(es): There were no encounter diagnoses. has a past medical history of Atrial fibrillation ([address] CHF (congestive heart failure) ([facility] [address] Hyperlipidemia, Hypertension, and Type II or unspecified type diabetes mellitus without mention of complication, not stated as uncontrolled. has a past surgical history that includes colectomy (2009). Restrictions  Restrictions/Precautions  Restrictions/Precautions: Fall Risk, Seizure  Position Activity Restriction  Other position/activity restrictions:  chronic L knee pain ( recent cortisone injection), low sodium diet  Subjective   General  Chart Reviewed: Yes  Patient assessed for rehabilitation services?: Yes  Additional Pertinent Hx: per MD note, Awilda Mccarty is a 75 y.o. male who presents to the ED for extremity weakness and hypoxemia. Patient states he attempted to get out of bed approximately 3 hours prior to arrival and he ended up sliding to the floor. Did not hit his head. He laid on the floor for up to 3 hours he could not get up on his own and he was extremely weak. His wife is unable to get him up. Initially he refused to let her call the ambulance but finally after 3 hours he allowed him to call the ambulance. They reported his fingerstick is greater than 100 but they did find him to be slightly hypoxic at 86% and they treated him with nasal cannula. Patient has a remote history of smoking. He has a history of atrial fibrillation and is currently anticoagulated. Denies any recent illnesses. ; ICU transfer d/t elevated BP  Response to previous treatment: Patient with

## 2019-09-09 NOTE — CARE COORDINATION
Patient Active Problem List   Diagnosis Code    Type 2 diabetes mellitus with diabetic polyneuropathy, without long-term current use of insulin ([facility]) E11.42    Atrial fibrillation ([facility]) I48.91    LVH (left ventricular hypertrophy) I51.7    Pulmonary hypertension ([facility]) I27.20    Atrial fibrillation with rapid ventricular response ([facility]) I48.91    Essential hypertension I10    Benign neoplasm of colon D12.6    Gout M10.9    Mixed hyperlipidemia E78.2    Claudication ([address] I73.9    Sepsis ([address] A41.9    Severe sepsis ([address] A41.9, R65.20    High anion gap metabolic acidosis N36.3    Acute respiratory failure with hypoxia ([facility]) J96.01    Acute pain of right knee M25.561    Arthritis of both knees M17.0    Debility R53.81        /71   Pulse 99   Temp 97.8 °F (36.6 °C) (Oral)   Resp 16   Ht 5' 9\" (1.753 m)   Wt 197 lb 15.6 oz (89.8 kg)   SpO2 92%   BMI 29.24 kg/m²     HgBA1c:    Lab Results   Component Value Date    LABA1C 6.9 08/27/2019       Recent Labs     09/08/19  1658 09/08/19  2103 09/09/19  0712 09/09/19  1202   POCGLU 192* 124* 205* 195*       BUN/Creatinine:    Lab Results   Component Value Date    BUN 33 09/08/2019    CREATININE 0.8 09/08/2019       Assessment        Diabetes Management and Education    Does the patient have a Primary Care Physician? Yes, Sussy Hall, DO       Does the patient require new medication instruction? No.  Prednisone discontinued. Home Metformin resumed. Does the patient/caregiver monitor Blood Glucoses? No:     Does the patient/caregiver follow a Meal Plan? No: Prefers to drink Silverio D. Recommend making water, unsweetened tea or coffee primary drinks. Reviewed importance of eating three meals per day and plate method for consistent carb intake.       Level of patient/caregiver understanding able to:       [x] Verbalized Understanding   [] Demonstrated Understanding       [] Teach Back       [] Needs Reinforcement     []  Other: Does the patient/caregiver understand S/S of Hyperglycemia? No:     Reviewed symptoms, prevention and treatment. Discussed impact of Prednisone on blood sugars. Level of patient/caregiver understanding able to:        [x] Verbalized Understanding   [] Demonstrated Understanding       [] Teach Back       [] Needs Reinforcement     []  Other:           Plan        Ongoing diabetes education and support. The following educational and support materials were provided:  · My contact information  · ADA booklet - Where Do I Begin?    · Academy of Nutrition and Dietetics handout - Carbohydrate Counting for People with Diabetes                                             Teaching Time Diabetes Education:  20 minutes     Electronically signed by Kellie Shi on 9/9/2019 at 12:45 PM

## 2019-09-10 NOTE — PLAN OF CARE
turn or reposition self at least every 2 hours to prevent skin breakdown. Pt encouraged to perform ADL's as independently as possible, with staff assistance available for pt if necessary. Pt adaptive devices and call light within reach. Will continue to monitor and assess.

## 2019-09-10 NOTE — PROGRESS NOTES
Strengthening, ROM, Balance Training, Endurance Training, Stair training, Neuromuscular Re-education, Home Exercise Program, Pain Management, Equipment Evaluation, Education, & procurement  Safety Devices  Type of devices:  All fall risk precautions in place, Gait belt, Left in chair, Patient at risk for falls(sitting in wheelchair returned to room with transportation )  Restraints  Initially in place: No     Therapy Time   Individual Concurrent Group Co-treatment   Time In 0815         Time Out 0900         Minutes 45         Timed Code Treatment Minutes: 45 [address]   Time In 1515     Time Out 1600     Minutes [id number], PT     Electronically signed by Ivelisse Campbell, PT on 9/10/19 at 9:43 AM

## 2019-09-10 NOTE — PROGRESS NOTES
Progress Note  Admit Date: 2019      PCP: Nanda Tovar DO     CC: F/U for AMS    SUBJECTIVE / Interval History:  Patient feels fine today. Had some dark colored stools      Allergies  Patient has no known allergies. Medications    Scheduled Meds:   pantoprazole  40 mg Oral QAM AC    metFORMIN  1,000 mg Oral BID WC    insulin lispro  0-18 Units Subcutaneous TID WC    insulin lispro  0-9 Units Subcutaneous Nightly    amLODIPine  5 mg Oral Daily    carvedilol  37.5 mg Oral BID    cloNIDine  0.2 mg Oral TID    folic acid  1 mg Oral Daily    furosemide  40 mg Oral Daily    lisinopril  20 mg Oral Daily    polyethylene glycol  17 g Oral Daily    sennosides-docusate sodium  2 tablet Oral BID    simvastatin  20 mg Oral Nightly    thiamine  100 mg Oral Daily     Continuous Infusions:    PRN Meds:  prochlorperazine, menthol-zinc oxide, calcium carbonate, promethazine, cyclobenzaprine, diphenhydrAMINE, ipratropium-albuterol, glucagon (rDNA), glucose, acetaminophen, magnesium hydroxide, bisacodyl, guaiFENesin-dextromethorphan, ondansetron, hydrALAZINE    Vitals    TEMPERATURE:  Current - Temp: 98.4 °F (36.9 °C); Max - Temp  Av.9 °F (36.6 °C)  Min: 97.3 °F (36.3 °C)  Max: 98.4 °F (36.9 °C)  RESPIRATIONS RANGE: Resp  Av.3  Min: 16  Max: 17  PULSE RANGE: Pulse  Av.5  Min: 91  Max: 104  BLOOD PRESSURE RANGE:  Systolic (07ZRX), IOB:185 , Min:118 , SST:862   ; Diastolic (74ZCR), VOM:09, Min:69, Max:88    PULSE OXIMETRY RANGE: SpO2  Av %  Min: 92 %  Max: 99 %  24HR INTAKE/OUTPUT:      Intake/Output Summary (Last 24 hours) at 9/10/2019 0704  Last data filed at 9/10/2019 0528  Gross per 24 hour   Intake 749.6 ml   Output 300 ml   Net 449.6 ml       Exam:    Gen: No distress. Eyes: PERRL. No sclera icterus. No conjunctival injection. ENT: No discharge. Pharynx clear. External appearance of ears and nose normal.  Neck: Trachea midline. No obvious mass. Resp: No accessory muscle use.  No crackles. No wheezes. No rhonchi. No dullness on percussion. CV: Regular rate. Regular rhythm. No murmur or rub. + edema. GI: Non-tender. Non-distended. No hernia. Skin: Warm, dry, normal texture and turgor. No nodule on exposed extremities. Lymph: No cervical LAD. No supraclavicular LAD. M/S: No cyanosis. No clubbing. No joint deformity. Neuro: Moves all four extremities. CN 2-12 tested, no defect noted. Psych: Oriented x 3. No anxiety. Awake. Alert. Intact judgement and insight. Data    LABS  CBC:   Recent Labs     09/08/19  0939  09/08/19  1801 09/09/19  0044 09/09/19  0542   WBC 12.1*  --   --   --   --    HGB 11.7*   < > 12.7* 11.1* 11.9*   HCT 35.2*   < > 38.4* 34.2* 36.3*   MCV 92.8  --   --   --   --      --   --   --   --     < > = values in this interval not displayed. BMP:   Recent Labs     09/08/19  0939      K 4.0      CO2 28   BUN 33*   CREATININE 0.8   GLUCOSE 224*     POC GLUCOSE:    Recent Labs     09/08/19  2103 09/09/19  0712 09/09/19  1202 09/09/19  1737 09/09/19  2043   POCGLU 124* 205* 195* 131* 155*     LIVER PROFILE: No results for input(s): AST, ALT, LIPASE, AMYLASE, LABALBU, BILIDIR, BILITOT, ALKPHOS in the last 72 hours. PT/INR: No results for input(s): PROTIME, INR in the last 72 hours. APTT: No results for input(s): APTT in the last 72 hours. UA:No results for input(s): NITRITE, COLORU, PHUR, LABCAST, WBCUA, RBCUA, MUCUS, TRICHOMONAS, YEAST, BACTERIA, CLARITYU, SPECGRAV, LEUKOCYTESUR, UROBILINOGEN, BILIRUBINUR, BLOODU, GLUCOSEU, KETUA, AMORPHOUS in the last 72 hours. Microbiology:  Wound Culture: No results for input(s): WNDABS, ORG in the last 72 hours. Invalid input(s):  LABGRAM  Nasal Culture: No results for input(s): ORG, MRSAPCR in the last 72 hours. Blood Culture: No results for input(s): BC, BLOODCULT2 in the last 72 hours. Fungal Culture:   No results for input(s): FUNGSM in the last 72 hours.   No results for input(s): FUNCXBLD in

## 2019-09-10 NOTE — CONSULTS
up   to 3.4 cm which can be seen with ileus or partial small bowel obstruction. 2. Right basilar airspace disease and small right-sided pleural effusion. Follow-up recommended to document resolution. The findings were sent to the Radiology Results [address] at 9:48   am on 9/8/2019to be communicated to a licensed caregiver. ASSESSMENT AND RECOMMENDATIONS   75 y.o. 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 We have been consulted regarding heme positive stool and 1 episode of reported melena     IMPRESSION:  1. Heme occult stool. Hemoglobin stable. No prior EGD. No heavy NSAID use. Takes pradaxa  2. 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 Bj [address]. Monitor h/h and stool output. If you have any questions or need any further information, please feel free to contact our consult team.  Thank you for allowing us to participate in the care of Cammie Clay.     Noel Teran PA-C

## 2019-09-10 NOTE — PROGRESS NOTES
Occupational Therapy  Facility/Department: [address] IP REHAB  Daily Treatment Note  NAME: Belinda Gage  : 1945  MRN: [id number]    Date of Service: 9/10/2019    Discharge Recommendations:  Home with Home health OT  OT Equipment Recommendations  Other: TBD--anticipate he may need shower chair, GB, TSF, 3 piece hip kit--can go thru [place] for items    Assessment   Performance deficits / Impairments: Decreased functional mobility ; Decreased strength;Decreased endurance;Decreased ADL status; Decreased high-level IADLs;Decreased balance  Assessment: Patient making steady gains w/ OT intervention & required less assistance during ADLs & t/f. He is learning to use A/E to manage LB ADLs however needs occasional VCs to initiate & complete tasks. He completed transfers in/out of w/c , toilet, shower chair w/ CG/min A in each direction, easily fatigues & required VCs for safe hand placement. He is now using 4 ww but only for short distances. He was able to stand x @2-3 minutes w/ CGA during Lb ADLs. Continue with POC. Treatment Diagnosis: impaired ADLs, t/f, strength, balance  History: CHF, DM, HTN, Afib, L knee pain  Exam: impaired ADLs, t/f, fxl mob, balance, safety, strength  Assistance / Modification:  needed min/CGA w/ household t/f using 4 ww, min A w/ LB ADLs & toileting  OT Education: ADL Adaptive Strategies  Patient Education: using reacher & SA for LB dressing  Barriers to Learning: impaired memory  Activity Tolerance  Activity Tolerance: Patient limited by fatigue  Activity Tolerance: pt requires extra time to complete ADLs d/t weakness/fatigue--MD reported pt tested + for blood in stool  Safety Devices  Safety Devices in place: Yes  Type of devices: Call light within reach; Chair alarm in place; Left in chair       PM session: met in therapy dept, pt's wife Genny Steele present.  Educated on shower chair + arms, TSF, GB and 3 piece hip kit--gave written info however pt wondering if [facility] will cover any of these Patient has a remote history of smoking. He has a history of atrial fibrillation and is currently anticoagulated. Denies any recent illnesses. ; ICU transfer d/t elevated BP  Response to previous treatment: Patient with no complaints from previous session  Family / Caregiver Present: No  Referring Practitioner: DR LARKIN  Diagnosis: s/p fall, sepsis, acute respiratory failure  Subjective  Subjective: met in room, he was resting quietly while seated in w/c, denies pain  General Comment  Comments: agreeable for shower      Orientation  Orientation  Overall Orientation Status: Within Functional Limits  Orientation Level: Oriented X4  Objective    ADL  Equipment Provided: Sock aid;Reacher;Long-handled shoe horn;Long-handled sponge  Feeding: Modified independent (FR 1800 mL, low sodium)  Grooming: Modified independent (safety concerns in standing to brush teeth & comb hair, seated to wash face on shower chair)  UE Bathing: Verbal cueing;Setup(OT managed water and gave VCs where to locate items, he washed, rinsed & dried UB while seated on shower chair)  LE Bathing: Contact guard assistance(washed tops of thighs<>shins while seated on shower chair, used LH sponge to wash & dry feet; stood w/ CGA to wash, rinse & dry kaitlynn areas, gave min VCs to keep 1 hand on GB AATs)  UE Dressing: Setup;Verbal cueing(VCs to initiate doff/donning of shirt, set up required)  LE Dressing: Minimal assistance(used reacher to doff brief, socks & pants.  Used reacher to thread L foot into brief & pants, stood w/ CG/min A to manage clothes over hips, donned socks & shoes w/ set up + A/E)  Toileting: Contact guard assistance(gave steadying while pt managed clothing,used GB for support, unable to urinate)  Additional Comments: Patient overall requies cues for sequence of tasks during ADL task        Balance  Sitting Balance: Supervision(has difficulty bending over to reach feet)  Standing Balance: Contact guard assistance  Standing Balance  Time: less than 2-3 minutes  Activity: during ADLs & household t/f  Comment: CG/min A using support of GB, sink or 4ww  Functional Mobility  Functional - Mobility Device: 4-Wheeled Walker  Activity: To/from bathroom  Assist Level: Contact guard assistance  Functional Mobility Comments: Functional mobility with RW with CG A, cues for walker safety, gait sequence and to stay inside the walker  Toilet Transfers  Toilet - Technique: Ambulating  Equipment Used: Grab bars  Toilet Transfer: Minimal assistance  Toilet Transfers Comments: CG A in each direction, used rail  Shower Transfers  Shower - Transfer From: Walker  Shower - Transfer Type: To  Shower - Transfer To: Shower seat with back  Shower - Technique: Ambulating  Shower Transfers: Minimal assistance  Shower Transfers Comments: cues for hand placement and CGA in each direction  Wheelchair Bed Transfers  Wheelchair/Bed - Technique: Ambulating  Equipment Used: Wheelchair  Level of Asssistance: Minimal assistance  Wheelchair Transfers Comments: CG/min A for sit<>stand depending on fatigue level     Transfers  Sit to stand: Minimal assistance  Stand to sit: Contact guard assistance  Transfer Comments: Transfer in/out of w/c with CG/Min A and cues for hand placement --difficulty sit<stand to a RW                    Vision  Patient Visual Report: No visual complaint reported. Vision Comment: wears reading glasses  Cognition  Following Commands: Follows all commands without difficulty  Attention Span: Appears intact  Safety Judgement: Decreased awareness of need for assistance  Problem Solving: Assistance required to implement solutions;Assistance required to generate solutions  Cognition Comment: pt pleasant & cooperative, VCs to initiate some tasks (OT not sure if pt resting or needed prompting)              Additional Activities Comment  Additional Activities: Pt practiced DRY shower stall t/f w/ shower chair + arms (BSC).  Pt given demo, then required CGA in each direction, given min VCs for sequencing              Car Transfers--completed during PM session  Car - Transfer From: Swathi Mcneal - Technique: Ambulating  Car Transfers: Minimal assistance  Car Transfers:  He was willing  to complete car t/f w/ wife present to observe. He was NOT able to recall safest technique by entering bottom first. He was able to enter/exit car when provided w/ heavy CGA in each direction, min VCs for safe hand placement. Discussed using another cushion or pillow to raise height of seat.            Plan   Plan  Times per week: 5-7  Times per day: Twice a day  Plan weeks: 7-10 days  Specific instructions for Next Treatment: instruct how to use A/E  Current Treatment Recommendations: Strengthening, Functional Mobility Training, Patient/Caregiver Education & Training, Endurance Training, Equipment Evaluation, Education, & procurement, Balance Training, Self-Care / ADL, Safety Education & Training, Gait Training, Home Management Training  Plan Comment: WED conf    Goals  Short term goals  Time Frame for Short term goals: by 5-7 days pt will complete:   Short term goal 1: UB dressing IND  Short term goal 2: LB dressing w/ MI using A/E prn  Short term goal 3: standing tolerance x 5-8 minutes w/ MI during ADLs/IADLs  Short term goal 4: household t/f w/ MI using RW  Short term goal 5: shower level bathing w/ SBA using shower chair + GB and LH sponge  Long term goals  Time Frame for Long term goals : by 7-10 days pt will complete  Long term goal 1: Feeding & grooming IND'ly--goal met  Long term goal 2: toilet tasks w/ MI  Long term goal 3: simple cooking tasks w/ MI using RW  Long term goal 4: address DME & A/E including family training prior to DC (may need shower chair, GB, TSF, 3 piece hip kit--can get thru [place]?)  Long term goal 5: UE exercises to increase strength to 4+/5 to be IND w/ sit<>stand transitions  Patient Goals   Patient goals : \"get strong enough to go up steps; get my strength back\"

## 2019-09-10 NOTE — PLAN OF CARE
Problem: Risk for Impaired Skin Integrity  Goal: Tissue integrity - skin and mucous membranes  Description  Structural intactness and normal physiological function of skin and  mucous membranes. Outcome: Ongoing  Note:   Skin assessment performed each shift per protocol. Patient turns and repositions self. Continent of bowel and bladder. Bottom noted to be red to gluteal fold, calmoseptine applied. Scattered bruising persist, pradaxa is currently on hold. Problem: Falls - Risk of:  Goal: Will remain free from falls  Description  Will remain free from falls  Outcome: Ongoing  Note:   Patient free from falls this shift. Fall precautions in place at all times. Bed in lowest position with two side rails up and wheels locked. Call light within reach. Patient able and agreeable to contact for safety appropriately. Patient did not get OOB overnight, used urinal and staff emptied. Problem: PAIN  Goal: Patient's pain/discomfort is manageable  Outcome: Ongoing  Note:   No complaints of pain or discomfort verbalized. Pt informed to notify staff of any changes in condition. Problem: HEMODYNAMIC STATUS  Goal: Patient has stable vital signs and fluid balance  Outcome: Ongoing  Note:   CHF patient. VSS. Peripheral pulse palpable. Skin pink, dry and warm. Bilateral mild lower ext edema noted. I&O monitor. Daily weight. Medication given as ordered. Education provided. Will continue to monitor. Fine crackles auscultated to right lower lobe. Denies cough or sob.

## 2019-09-10 NOTE — PATIENT CARE CONFERENCE
[facility]  Inpatient Rehabilitation  Weekly Team Conference Note      Date: 2019  Patient Name:  Jeanette Castrejon    MRN: [id number]  : 1945  Gender: male  Physician: Dr Jose Barbour   Diagnosis: Debility [R53.81]    CASE MANAGEMENT  Assessment:   Goal is home.       PHYSICAL THERAPY    Bed mobility  Rolling to Left: Stand by assistance  Rolling to Right: Stand by assistance  Supine to Sit: Contact guard assistance  Sit to Supine: Stand by assistance  Scooting: Stand by assistance  Comment: thearpy flat mat, no railing; significant increased time due to weakness, VC for proper seqeuncing throughout     Transfers:  Sit to Stand: Contact guard assistance(moderate cues for scooting forward and \"nose over toes\", pt limited due to fatigue )  Stand to sit: Contact guard assistance(VC for hand placement)  Comment: sit <-> stand rollator--> CGA with cues throughout for safety and proper placement of rollator against wall for stability prior to sitting    Ambulation 1  Surface: level tile  Device: Rollator  Assistance: Contact guard assistance, Stand by assistance  Quality of Gait: decreased foot clearance barry and decreased step length, forward lean on walker with slight flexed posture corrected with VC, no LOB and gait less antalgic this date  Distance: 105', 80' with multiple turns each  Comments: pt complains of weakness/fatigue with BLEs throughout, educated on use of rollator for energy conservation but used RW for stability on steps this date/short distances in therapy gym using RW this date     Stairs  # Steps : 8  Stairs Height: 6\"  Rails: Bilateral  Curbs: 6\"(X 1 trial without railing--> RW for increased stability min assist to ascend with increased cues, descend with CGA VC; increased time to perform (RW at this time needs increased stability with steps. )  Device: No Device  Assistance: Contact guard assistance, Minimal assistance  Comment: Increased time to perform, slight self limiting of Venessa Rowell.     MD: Dr. Amaya Conway

## 2019-09-10 NOTE — DISCHARGE INSTR - COC
Liquids  Daily Fluid Restriction: yes - amount 2000 mL however patient is dehydrated  Last Modified Barium Swallow with Video (Video Swallowing Test): not done    Treatments at the Time of Hospital Discharge:   Respiratory Treatments: duoneb prn  Oxygen Therapy:  is not on home oxygen therapy. Ventilator:    - No ventilator support    Rehab Therapies: Nurse, Physical Therapy and Occupational Therapy  Weight Bearing Status/Restrictions: No weight bearing restirctions  Other Medical Equipment (for information only, NOT a DME order):  walker  Other Treatments: insulin    Heart Failure Instructions for Daily Management  Patient was treated for acute on chronic diastolic heart failure. he  will require the following:     Please weigh daily on the same scale and approximately the same time of day. Report weight gain of 3 pounds/day or 5 pounds/week to : [address] and [address] [phone].  Please use hospital discharge weight as baseline reference.  Please monitor for signs and symptoms of and report to MD:  o Worsening Heart Failure: sudden weight gain, shortness of breath, lower extremity or general edema/swelling, abdominal bloating/swelling, inability to lie flat, intolerance to usual activity, or cough (especially at night). Report these finding even if no increase in weight.  o Dehydration:  having difficulty or a decrease in urination, dizziness, worsening fatigue, or new onset/worsening of generalized weakness.  Please continue a LOW SODIUM diet and LIMIT fluid intake to 48 - 64 ounces ( 1.5 - 2 liters) per day.  Call [address] [phone] and [facility] [phone] and/or Darrlel Kim @ [phone] with any questions or concerns.  Please continue heart failure education to patient and family/support system.  See After Visit Summary for hospital follow up appointment details.    Consider spiritual care referral for support and/or completion of advance directives [phone].  Consider: [place] [address] telehealth program if patient agreeable and able to participate, palliative care consult for ongoing goals of care, end of life, and/or chronic disease management discussions and referral to [facility] [phone]) once SNF/HHC complete.     HOME HEALTH CARE: LEVEL 3 SAFETY     Home health agency to establish plan of care for patient over 60 day period   Nursing  Initial home SN evaluation visit to occur within 24-48 hours for:  1)  medication management  2)  VS and clinical assessment  3)  S&S chronic disease exacerbation education + when to contact MD/NP  4)  care coordination  Medication Reconciliation during 1st SN visit  PT/OT/Speech   Evaluations in home within 24-48 hours of discharge to include DME and home safety   Frontload therapy 5 days, then 3x a week   OT to evaluate if patient has [address] needs for personal care    evaluation within 24-48 hours to evaluate resources & insurance for potential AL, [place], LTC, and Medicaid options   Palliative Care referral within 5 days of hospital discharge   PCP Visit scheduled within 3 - 7 days of hospital discharge [address] (If patient is agreeable and meets guidelines)    Patient's personal belongings (please select all that are sent with patient):  Glasses    RN SIGNATURE:  Electronically signed by Kristopher Mandel RN on 9/17/19 at 10:03 AM    CASE MANAGEMENT/SOCIAL Clarisa  65. Status SFPQ:[id number]    Readmission Risk Assessment Score:  21    Discharging to Facility/ Agency   · Name:  [facility]    · Address: [address] [place] [address]., [address] Sharif ESPOSITO  · Phone: [phone]  · Fax: [phone]    / signature: Karla Ferro     Case Management   [phone]    9/16/2019  4:17 PM      PHYSICIAN SECTION    Prognosis: Good    Condition at Discharge:

## 2019-09-11 NOTE — PROGRESS NOTES
Occupational Therapy  Facility/Department: [address] IP REHAB  Daily Treatment Note  NAME: Placido Mari  : 1945  MRN: [id number]    Date of Service: 2019    Discharge Recommendations:  Home with Home health OT  OT Equipment Recommendations  Other: TBD--anticipate he may need shower chair, GB, TSF, 3 piece hip kit--can go thru [facility] or [place] for items    Assessment   Performance deficits / Impairments: Decreased functional mobility ; Decreased strength;Decreased endurance;Decreased ADL status; Decreased high-level IADLs;Decreased balance  Assessment: Patient making steady gains w/ OT intervention & required less assistance during sit<>stand & t/f. He shows good recall to use armrests during sit<>stand transitions, less assistance required (close SB/CGA). Reviewed the need for TSF and where to purchase (may need to purchase from [facility] or [facility] and get reimbursed by [place]). He was able to stand x @5 minutes w/ Close SBA during static activities at . Continue with POC. Treatment Diagnosis: impaired ADLs, t/f, strength, balance  History: CHF, DM, HTN, Afib, L knee pain  Exam: impaired ADLs, t/f, fxl mob, balance, safety, strength  Assistance / Modification:  needed min/CGA w/ household t/f using 4 ww, min A w/ LB ADLs & toileting  OT Education: ADL Adaptive Strategies  Patient Education: using TSF, sitting in higher surfaces w/ armrests  Barriers to Learning: impaired memory  Activity Tolerance  Activity Tolerance: Patient Tolerated treatment well  Activity Tolerance: less assistance required for sit<>stand transitions, standing endurance improving, no LOB  Safety Devices  Safety Devices in place: Yes  Type of devices: Gait belt       PM session: met pt & his wife Chaim Granda in gym. Discussed DME acquisition--agreeable to pay out of pocket for TSF, 3 piece hip kit from [facility]--SW notified. OT inquired about shower chair + arms w/ Alden Kill at [place] (waiting for response).  Pt participated in

## 2019-09-11 NOTE — PROGRESS NOTES
Patient is a 74 y/o male admitted to rehab with sepsis with acute respiratory failure with hypoxia. A/A/O x 4. Transfers with walker x 1 . On low salt, carb count 5g 2000 FR select diet, tolerating well. Medications taken whole with water. On SCD for DVT prophylaxis. Skin bruising to BLE BUE ,scatered abrasions. On room air. Has been continent/incontinent of bowel and bladder. LBM 9/11/19. Chair/bed alarms in use and call light in reach. L/D/A. Tele. Will monitor for safety.

## 2019-09-11 NOTE — PROGRESS NOTES
Physical Therapy  Facility/Department: [address] IP REHAB  Daily Treatment Note  NAME: Kayla Bell  : 1945  MRN: [id number]    Date of Service: 2019    Discharge Recommendations:  Home with assist PRN, Home with Home health PT, S Level 1, Patient would benefit from continued therapy after discharge   PT Equipment Recommendations  Equipment Needed: Yes  Mobility Devices: Giorgi Pong: Rolling  Other: pt will need RW for safe mobility at d/c    Assessment   Body structures, Functions, Activity limitations: Decreased functional mobility ; Decreased endurance;Decreased ROM; Decreased strength;Decreased balance  Assessment: Pt able to complete transfers and community distance ambulation this date with RW and SBA, but does fatigue quickly requiring standing and seated rest breaks throughout the session. He demonstrates proximal weakness in BLEs, but able to ascend/descend 8 steps with CGA-Ness and heavy reliance on barry rails. Pt will benefit from skilled PT to improve safety and independence with functional mobility prior to returning home with assist PRN from wife. Treatment Diagnosis: difficulty walking  Specific instructions for Next Treatment: gait and transfers  Prognosis: Good  REQUIRES PT FOLLOW UP: Yes  Activity Tolerance  Activity Tolerance: Patient limited by fatigue;Patient limited by endurance; Patient limited by pain     Patient Diagnosis(es): There were no encounter diagnoses. has a past medical history of Atrial fibrillation ([address] CHF (congestive heart failure) ([address] Hyperlipidemia, Hypertension, and Type II or unspecified type diabetes mellitus without mention of complication, not stated as uncontrolled. has a past surgical history that includes colectomy ().     Restrictions  Restrictions/Precautions  Restrictions/Precautions: Fall Risk, Seizure  Position Activity Restriction  Other position/activity restrictions:  chronic L knee pain ( recent cortisone injection), carb control complaints from previous session. Family / Caregiver Present: No  Referring Practitioner: Dr. Beny Swann: Pt states he is feeling \"not too bad. \"  Pt denies pain. Pleasant and agreeable to PT treatment.                 Objective      Transfers  Sit to Stand: Stand by assistance  Stand to sit: Stand by assistance  Comment: increased time for sit to stand transfer due to BLE weakness     Ambulation  Ambulation?: Yes  More Ambulation?: Yes  Ambulation 1  Surface: level tile  Device: Rollator  Assistance: Stand by assistance(close SBA)  Quality of Gait: decreased foot clearance barry and decreased step length, slightly forward flexed posture  Distance: 240' with one standing rest break; short distances in therapy gym  Comments: pt reports discomfort in buttocks with ambulation but reports this is not new; pt reports he prefers RW over rollator  Ambulation 2  Surface - 2: uneven;outdoors  Device 2: [address] 2: Stand by assistance;Contact guard assistance  Quality of Gait 2: decreased foot clearance barry and decreased step length, slightly forward flexed posture  Distance: approx 50' on therapy patio to bench and back with one standing rest break  Comments: slightly unsteady at times but no LOB  Stairs/Curb  Stairs?: Yes  Stairs  # Steps : 8  Stairs Height: 6\"  Rails: Bilateral  Assistance: Contact guard assistance;Minimal assistance  Comment: CGA-Ness ascending and pt demonstrates forward flexed posture with heavy reliance on barry rails; tactile cue for R glute activation; CGA descending with VCs for eccentric control; non-reciprocal gait pattern throughout        Exercises  Gluteal Sets: x20  Hip Flexion: x20 alternating  Knee Long Arc Quad: x20 alternating  Ankle Pumps: x20 heel raises/toe raises  Comments: ther ex seated in w/c  Other exercises  Other exercises?: Yes  Other exercises 1: NuStep 8 min, BUEs and BLEs, resistance level 3; avg SPM 56                     Goals  Short term

## 2019-09-11 NOTE — PROGRESS NOTES
Patient was admitted on 9/5 for sepsis with acute respiratory failure with hypoxia. Patient is A&O x4. Patient does not complain of pain. Patient transfers with walker x 1. All PM medications were given without issue. Pt did not c/o of any nausea or vomiting this shift. Patient is continent of bowels and bladder. Last BM 9/11, positive occult on 9/9. Patient is on 2000 FR. Patient is sleeping all shift. Call light is within reach. Pt calls as needed.

## 2019-09-11 NOTE — PLAN OF CARE
Problem: Falls - Risk of:  Goal: Will remain free from falls  Description  Will remain free from falls  Outcome: Ongoing  Note:   Fall risk band on patient. Orange light on outside of room. Non skid footwear in place. Alarms used appropriately. Patient instructed to call and wait for staff before getting up. Rounding done to anticipate needs. Appropriate safety devices used for transfers. Problem: Risk for Impaired Skin Integrity  Goal: Tissue integrity - skin and mucous membranes  Description  Structural intactness and normal physiological function of skin and  mucous membranes. Outcome: Ongoing     Problem: HEMODYNAMIC STATUS  Goal: Patient has stable vital signs and fluid balance  Outcome: Ongoing  Note:   Vital signs monitored per unit routine. Monitoring intake and output. Daily weight. Dietary restrictions noted and followed. 15 minutes  CHF education  given. Problem: ACTIVITY INTOLERANCE/IMPAIRED MOBILITY  Goal: Mobility/activity is maintained at optimum level for patient  Outcome: Ongoing  Note:   Patient working with therapy at least 3 hours/day to obtain maximal mobility. Safety devices used. Problem: OXYGENATION/RESPIRATORY FUNCTION  Goal: Patient will achieve/maintain normal respiratory rate/effort  Description  Respiratory rate and effort will be within normal limits for the patient  Note:   Patient with ongoing education of deep breathing exercise.

## 2019-09-12 NOTE — PROGRESS NOTES
x20 hip ADD sets, 2x10 hip ABD with maxislide, 2x10 heel slides barry with maxislide, 20 glute sets  Supine to sit SBA-CGA and extra time. Pt ambulated multiple short distances in therapy gym with RW and SBA. Pt ascended/descended 8 steps with barry rails, Ness ascending and CGA descending with non-reciprocal gait pattern. Heavy reliance on barry rails. Sit to stand x5 with CGA-Ness with fatigue  Seated exercise: x15 hamstring curls barry with yellow t band  Transfers SBA-Ness throughout session (increased assist with fatigue)  Pt continues to be limited by proximal BLE weakness and decreased muscle endurance. Pt becomes easily discouraged about weakness requiring encouragement.   Safety Device - Type of devices:  [x]  All fall risk precautions in place [] Bed alarm in place  [] Call light within reach [] Chair alarm in place [] Positioning belt [x] Gait belt [] Patient at risk for falls [] Left in bed [] Left in chair [] Telesitter in use [] Sitter present [] Nurse notified []  None  Electronically signed by Margarita Salvador, PT [id number] on 9/12/2019 at 4:04 PM        Goals  Short term goals  Time Frame for Short term goals: 7-10 days from 9/6 or upon d/c  Short term goal 1: bed mobility mod I  Short term goal 2: transfers mod I  Short term goal 3: ambulate 150' with LRAD and mod I  Short term goal 4: ascend/descend curb step mod I  Short term goal 5: ascend/descend 16 steps with barry rails mod I  Long term goals  Time Frame for Long term goals : LTG=STG  Patient Goals   Patient goals : \"to be able to get up and down 15 steps\"    Plan    Plan  Times per week: 5-6 days/wk  Times per day: Twice a day  Plan weeks: 7-10 days  Specific instructions for Next Treatment: gait and transfers  Current Treatment Recommendations: Functional Mobility Training, Transfer Training, Gait Training, Safety Education & Training, Patient/Caregiver Education & Training, Strengthening, ROM, Balance Training, Endurance Training, Stair training, Neuromuscular Re-education, Home Exercise Program, Pain Management, Equipment Evaluation, Education, & procurement  Safety Devices  Type of devices:  All fall risk precautions in place, Gait belt  Restraints  Initially in place: No     Therapy Time   Individual Concurrent Group Co-treatment   Time In 0945         Time Out 1030         Minutes 45         Timed Code Treatment Minutes: 45 Minutes         Electronically signed by Reyna Luna, PT [id number] on 9/12/2019 at 10:24 AM     Second Session Therapy Time     Individual Co-treatment   Time In 2153     Time Out 1600     Minutes 39

## 2019-09-12 NOTE — PROGRESS NOTES
Patient was admitted on 9/5 for sepsis with acute respiratory failure with hypoxia. Patient is A&O x4. Patient does not complain of pain. Patient transfers with walker x 1. All PM medications were given without issue. Pt did not c/o of any nausea or vomiting this shift. Patient is continent of bowels and bladder. Last BM 9/12, positive occult on 9/9. Patient is on 2000 FR. Patient is sleeping all shift. Call light is within reach. Pt calls as needed.

## 2019-09-12 NOTE — PROGRESS NOTES
approximately 3 hours prior to arrival and he ended up sliding to the floor. Did not hit his head. He laid on the floor for up to 3 hours he could not get up on his own and he was extremely weak. His wife is unable to get him up. Initially he refused to let her call the ambulance but finally after 3 hours he allowed him to call the ambulance. They reported his fingerstick is greater than 100 but they did find him to be slightly hypoxic at 86% and they treated him with nasal cannula. Patient has a remote history of smoking. He has a history of atrial fibrillation and is currently anticoagulated. Denies any recent illnesses. ; ICU transfer d/t elevated BP  Response to previous treatment: Patient with no complaints from previous session  Family / Caregiver Present: No  Referring Practitioner: DR LARKIN  Diagnosis: s/p fall, sepsis, acute respiratory failure  Subjective  Subjective: Pt seen bedside for an ADL. Pt requested a sponge bath. Objective    ADL  Grooming: Modified independent (Seated at the sink to wash face, hands, and brush teeth and hair. Pt declined shaving.)  UE Bathing: Setup(Seated at the sink.)  LE Bathing: Contact guard assistance(Washed below knees using LH sponge. Pt stood with CGA and washed posterior.)  UE Dressing: Setup  LE Dressing: Contact guard assistance(Used reacher to thread pants and depends. Donned socks using sock aid. Donned shoe using LH shoehorn. Stood with CGA to pull up pants.)        Transfers  Sit to stand: Contact guard assistance;Stand by assistance  Stand to sit: Stand by assistance     PM Session: Pt seen in department. Pt ambulated to activity room, and in therapy gym with CGA. Pt completed transfer to the car with CGA. Pt able to recall technique for the transfer. Transfer to chair with arms with CGA. Transferred to a couch which was a low surface and required min assist to stand from the couch. Completed UE exercise with 2lb weights, x 10 reps.

## 2019-09-13 NOTE — PROGRESS NOTES
Transfers  Wheelchair/Bed - Technique: Ambulating  Equipment Used: Wheelchair; Other(chair without arms)  Level of Asssistance: Stand by assistance         PM Session: Pt seen in department. Pt's wife present during therapy. Reviewed placement for grab bars in the shower. Suggested placing bars just inside the shower to hold when stepping in and out of the shower. Also suggested grab bar on the back wall to use when standing to wash and to sit/stand from the shower chair. Pt gathered items to cook an egg. He was able to transport the egg in his pocket and used counters to transport skillet. He was able to pick the correct burner without assist and turned the burners off when finished. Pt loaded dishes in the . Completed all with SBA. Transfer to chair with arms, wheelchair with SBA. Verified with wife they do not need to purchase grab bars. Will cancel order for grab bars with [facility].        Plan   Plan  Times per week: 5-7  Times per day: Twice a day  Plan weeks: [place] Tues to home w/ wife and [facility] services  Specific instructions for Next Treatment: instruct how to use A/E  Current Treatment Recommendations: Strengthening, Functional Mobility Training, Patient/Caregiver Education & Training, Endurance Training, Equipment Evaluation, Education, & procurement, Balance Training, Self-Care / ADL, Safety Education & Training, Gait Training, Home Management Training  Plan Comment: WED conf    Goals  Short term goals  Time Frame for Short term goals: by 5-7 days pt will complete:   Short term goal 1: UB dressing IND  Short term goal 2: LB dressing w/ MI using A/E prn  Short term goal 3: standing tolerance x 5-8 minutes w/ MI during ADLs/IADLs  Short term goal 4: household t/f w/ MI using RW  Short term goal 5: shower level bathing w/ SBA using shower chair + GB and LH sponge  Long term goals  Time Frame for Long term goals : by 7-10 days pt will complete  Long term goal 1: Feeding & grooming

## 2019-09-13 NOTE — PLAN OF CARE
Problem: Falls - Risk of:  Goal: Will remain free from falls  Description  Will remain free from falls  Outcome: Ongoing  Goal: Absence of physical injury  Description  Absence of physical injury  Outcome: Ongoing   Fall risk assessment completed. Fall precautions in place. Call light within reach. Pt educated on calling for assistance before getting up. Walkway free of clutter. Will continue to monitor. Problem: Risk for Impaired Skin Integrity  Goal: Tissue integrity - skin and mucous membranes  Description  Structural intactness and normal physiological function of skin and  mucous membranes. Outcome: Ongoing   Skin assessment performed each shift per protocol. Skin care protocol in place. Checked skin overlying bony prominences. Checked skin folds, between fingers and toes, and under and around medical devices for skin integrity. Will continue to monitor. Problem: OXYGENATION/RESPIRATORY FUNCTION  Goal: Patient will achieve/maintain normal respiratory rate/effort  Description  Respiratory rate and effort will be within normal limits for the patient  Outcome: Ongoing     Problem: HEMODYNAMIC STATUS  Goal: Patient has stable vital signs and fluid balance  Outcome: Ongoing     Problem: ACTIVITY INTOLERANCE/IMPAIRED MOBILITY  Goal: Mobility/activity is maintained at optimum level for patient  Outcome: Ongoing     Problem: Acute Pain  Goal: Control of acute pain  Description  Control of acute pain    Outcome: Ongoing  Pt encouraged to increase ambulation and mobility. Pt working with PT and OT and performing strengthening excercises. Pt is encouraged to take frequent rest periods to prevent overexertion and SOB related to intolerance of activity. Pt educated to turn or reposition self at least every 2 hours to prevent skin breakdown. Pt encouraged to perform ADL's as independently as possible, with staff assistance available for pt if necessary. Pt adaptive devices and call light within reach.  Will continue to monitor and assess.

## 2019-09-13 NOTE — PROGRESS NOTES
Patient was admitted on 9/5 for sepsis with acute respiratory failure with hypoxia. Patient alert and oriented x 4 .VSS.  Patient's last bowel movement noted on September , 13 2019. Patient transfers x [facility].   Patient aware of room and surroundings. Morning assessment complete.  Patient educated on use of call light. Medication administered this morning with no complications. Patient current pain level status: denies, Interventions made as necessary: none needed at this time. The use of rest and other techniques specific to this patient are used as a means of non-pharmacological interventions for pain. Continuing education specific to the this patient is continue throughout stay. Bed/Chair alarm on  and call light within reach. Input and output being monitored along with daily weights. Patient has no further needs at this time. RN will continue to monitor.

## 2019-09-13 NOTE — PROGRESS NOTES
Patient admitted with debility r/t sepsis and ARF. Pt. A&O x4. VSS on room air. Shift assessment complete. Patient transfers with CGA with walker/gait belt. Pt. calls out appropriately for toileting needs. Last bowel movement noted on 9/12. Tolerating Carb control diet and fluid restriction of 2000ml well. Medication administered, pt. tolerated well. Denies pain this shift thus far. Administered PRN benedryl per pt request to assist with sleeping. Fall precautions in place, call light and belongings within reach. Will continue to monitor.   Electronically signed by Soham Sanchez RN on 9/13/2019 at 3:49 AM

## 2019-09-14 NOTE — PROGRESS NOTES
75 yr old male admitted to ARU with diagnosis of sepsis with acute respiratory failure with hypoxia. Remains A & O x 4. Ambulates and transfers with supervision x 1 staff with walker and gaitbelt. Ate 100% of breakfast and is compliant with dietary and fluid restrictions without complaint. Continues with BLE edema as well as LUE edema. BLE discoloration/redness without change. In bed at present resting without complaint. Position for comfort.

## 2019-09-15 NOTE — PROGRESS NOTES
75 yr old male admitted to ARU with diagnosis of sepsis with acute respiratory failure with hypoxia. Remains A & O x 4. Ambulates and transfers with supervision x 1 staff with walker and gaitbelt. Compliant with dietary and fluid restrictions without complaint. Continues with BLE edema as well as LUE edema. BLE discoloration/redness without change. Wife at bedside visiting. Patient in good spirits.

## 2019-09-15 NOTE — PROGRESS NOTES
Department of Physical Medicine & Rehabilitation   Progress Note    Patient Identification:  Diann Tong  [id number]  : 1945  Admit date: 2019      Diagnosis:   Patient Active Problem List   Diagnosis    Type 2 diabetes mellitus with diabetic polyneuropathy, without long-term current use of insulin ([facility]    Atrial fibrillation ([facility]    LVH (left ventricular hypertrophy)    Pulmonary hypertension (HCC)    Atrial fibrillation with rapid ventricular response (HCC)    Essential hypertension    Benign neoplasm of colon    Gout    Mixed hyperlipidemia    Claudication (HCC)    Sepsis ([facility]    Severe sepsis (HCC)    High anion gap metabolic acidosis    Acute respiratory failure with hypoxia (HCC)    Acute pain of right knee    Arthritis of both knees    Debility           Subjective:   Resting this morning; wakes to voice. No new c/o.       /64   Pulse 78   Temp 98.1 °F (36.7 °C) (Oral)   Resp 16   Ht 5' 9\" (1.753 m)   Wt 192 lb 7.8 oz (87.3 kg)   SpO2 97%   BMI 28.43 kg/m²     Last 24 hour lab  Recent Results (from the past 24 hour(s))   POCT Glucose    Collection Time: 19 12:10 PM   Result Value Ref Range    POC Glucose 186 (H) 70 - 99 mg/dl    Performed on ACCU-CHEK    POCT Glucose    Collection Time: 19  4:54 PM   Result Value Ref Range    POC Glucose 160 (H) 70 - 99 mg/dl    Performed on ACCU-CHEK    POCT Glucose    Collection Time: 19  8:43 PM   Result Value Ref Range    POC Glucose 121 (H) 70 - 99 mg/dl    Performed on ACCU-CHEK    POCT Glucose    Collection Time: 09/15/19  7:34 AM   Result Value Ref Range    POC Glucose 107 (H) 70 - 99 mg/dl    Performed on ACCU-CHEK        Therapy progress:  PT  Position Activity Restriction  Other position/activity restrictions:  chronic L knee pain ( recent cortisone injection), carb control diet; 2000 mL fluid restiction  Objective     Sit to Stand: Stand by assistance  Stand to sit: Stand by assistance  Device:

## 2019-09-16 NOTE — PROGRESS NOTES
PHYSICAL THERAPY  Progress Note   Second Session    Patient Name: Ivory Blankenship  Medical Record Number: [id number]    Treatment Diagnosis: difficulty walking due to generalized weakness/debility      Chart Reviewed: Yes   Restrictions/Precautions: Fall Risk, Seizure Other position/activity restrictions:  chronic L knee pain ( recent cortisone injection), carb control diet; 2000 mL fluid restiction   Additional Pertinent Hx: Per MD Melissa \"Patient discussed with our rehab unit admission nurse. Patient is a 59-year-old male with a past medical history of A. fib, CHF, hypertension, hyperlipidemia who presented to [facility] with shortness of breath, cough and was found to have an oxygen saturation of 86%. He was admitted with possible sepsis and possible pneumonia. Patient was also found to have uncontrolled hypertension and was started on a Cardene drip on admission. That has been switched to oral blood pressure medications with good blood pressure control. He had both knees injected with cortisone with improvement. He was started therapies, but needs more therapy before discharge to home safely. He does live at home with his wife in a condo. \"        Subjective: Pt pleasant and agreeable to PT treatment. Reports 3/10 pain in back. Requesting pain med. CHERYL Correa notified. Wife present. Objective:  Pt ambulated short distances in therapy gym with RW and mod I.  Pt ascended/descended curb step with RW 2 trials, first trial CGA from PT and 2nd trial CGA from wife. Pt's wife able to provide proper assist.  Pt ascended 12 steps with barry rails and non-reciprocal gait pattern, first 4 steps with CGA from PT, last 8 steps with CGA from wife. Wife instructed on proper guarding technique and demonstrated appropriately. CHERYL Correa in during session to give pain med.   Standing exercises in parallel bars with BUE support and SBA-CGA (exericses from HEP handout provided this AM): x10 minisquats, heel raises barry,

## 2019-09-16 NOTE — PROGRESS NOTES
Occupational Therapy  Facility/Department: [address] IP REHAB  Daily Treatment Note  NAME: Yun Wilson  : 1945  MRN: [id number]    Date of Service: 2019    Discharge Recommendations:  Home with Home health OT  OT Equipment Recommendations  Other: Shower chair + arms, GB, TSF, 3 piece hip kit [facility] from [facility]    Assessment   Performance deficits / Impairments: Decreased functional mobility ; Decreased endurance;Decreased high-level IADLs  Assessment: pt has met 3/5 STG & LTGs w/ OT intervention. He is now IND w/ feeding & grooming tasks, able to stand at sink x 5 minutes w/ MI; reported LBP but no LOB. Used RW for household t/f and to retrieve items from closet & transported to [facility]. He declined a shower today & plans to have his [place] cut\" tomorrow once he returns home. His wife Kath Briscoe was present during several sessions for training & acquistion of DME & A/E. He had GB, higher toilet + TSF installed, getting a shower chair + arms, RW from [employer]. Pt is returning home on Tues w/ [facility] services, has supportive wife who provides IADLs  Treatment Diagnosis: impaired ADLs, t/f, strength, balance  Prognosis: Good  History: CHF, DM, HTN, Afib, L knee pain  Exam: impaired ADLs, t/f, fxl mob, balance, safety, strength  Assistance / Modification:  needed MI w/ household t/f using Rw  REQUIRES OT FOLLOW UP: Yes       PM session: met in room, his wife Kath Briscoe present. Pt reporting LBP, RN notified, came to take vitals, aware he has pitting edema in B LEs. Pt agreeable for UE HEP using theraband and 9 hole peg and dynamometer testing. Pt's R hand  went down by 10 lbs and  L hand decreased by 7 lbs. Pt's UEs have increased swelling recently (pt on Lasix). See bolded section below. Issued blue theraband for UE HEP--reviewed each of the 7 exercises for 1 set of 5-10. Pt stated \"I'm worn out. \" Discussed CHF strategies by doing daily wts, monitoring his BP, elevating his LEs and completing UE exercises. Pt is being DC'ed to home w/ wife's support on Tuesday--OT offered him another chance to shower tomorrow AM however he declined. Tx time: 45 min Charles Flowers OTR/L #[id number]    Patient Diagnosis(es): There were no encounter diagnoses. has a past medical history of Atrial fibrillation ([facility] CHF (congestive heart failure) ([facility] Hyperlipidemia, Hypertension, and Type II or unspecified type diabetes mellitus without mention of complication, not stated as uncontrolled. has a past surgical history that includes colectomy (2009). Restrictions  Restrictions/Precautions  Restrictions/Precautions: Fall Risk, Seizure  Position Activity Restriction  Other position/activity restrictions:  chronic L knee pain ( recent cortisone injection), carb control diet; 2000 mL fluid restiction  Subjective   General  Chart Reviewed: Yes  Patient assessed for rehabilitation services?: Yes  Additional Pertinent Hx: per MD note, Jeanette Castrejon is a 75 y.o. male who presents to the ED for extremity weakness and hypoxemia. Patient states he attempted to get out of bed approximately 3 hours prior to arrival and he ended up sliding to the floor. Did not hit his head. He laid on the floor for up to 3 hours he could not get up on his own and he was extremely weak. His wife is unable to get him up. Initially he refused to let her call the ambulance but finally after 3 hours he allowed him to call the ambulance. They reported his fingerstick is greater than 100 but they did find him to be slightly hypoxic at 86% and they treated him with nasal cannula. Patient has a remote history of smoking. He has a history of atrial fibrillation and is currently anticoagulated. Denies any recent illnesses. ; ICU transfer d/t elevated BP  Response to previous treatment: Patient with no complaints from previous session  Family / Caregiver Present: No  Referring Practitioner: DR LARKIN  Diagnosis: s/p fall, sepsis, acute respiratory using RW--not met, requires SBA using RW, cues for safety  Long term goal 4: address DME & A/E including family training prior to DC (may need shower chair, GB, TSF, 3 piece hip kit--[facility]?)--goal net  Long term goal 5: UE exercises to increase strength to 4+/5 to be IND w/ sit<>stand transitions--goal met  Patient Goals   Patient goals : \"get strong enough to go up steps; get my strength back\"       Therapy Time   Individual Concurrent Group PM-treatment   Time In        1300   Time Out        1345   Minutes        [address] [place], [place] #[id number] (revised on 9/18/19 to include recent ADL scores)

## 2019-09-16 NOTE — PLAN OF CARE
Problem: OXYGENATION/RESPIRATORY FUNCTION  Goal: Patient will achieve/maintain normal respiratory rate/effort  Description  Respiratory rate and effort will be within normal limits for the patient  Outcome: Ongoing  Note:   Pt with h/o CHF. Assess lungs every shift. Encourage IS and CADB. Monitor I and O. Monitor Low Na diet. Maintain 2L fluid restriction per orders. Monitor any cough and sputum. Daily weights.

## 2019-09-16 NOTE — PROGRESS NOTES
abuse/dependence-No signs of withdrawal, thiamine, folic acid     DM 2 - uncontrolled , sliding scale, resume metformin     Bowels: Schedule Miralax + Senna S. Follow bowel movements. Enema or suppository if needed.      Bladder: Check PVR x 3. [address] if PVR > 200ml or if any volume is > 500 ml.      Pain: Tylenol is ordered prn.        Colton Delcid MD, 9/16/2019, 7:41 AM

## 2019-09-16 NOTE — PROGRESS NOTES
services, has supportive wife who provides IADLs  Prognosis: Good  Barriers to Learning: impaired memory  REQUIRES OT FOLLOW UP: Yes  Discharge Recommendations: Home with Home health [facility]: LEVEL 2 SOCIAL      -Initial home health evaluation to occur within 24-48 hours, in patient home    -Home health agency to establish plan of care for patient over 60 day period    -Medication Reconciliation    -PCP Visit scheduled within seven days of discharge    -PT/OT to evaluate with goal of regaining prior level of functioning    -OT to evaluate if patient has [address] needs for personal care    - evaluation within 24-48 hours, includes evaluation of resources     and insurance to determine AL/IL/LTC/Medicaid options    -[place] [facility] Referral    -Dietician evaluation within five days of discharge    -Anastacia Kim to discuss home support and care needs post   discharge.  This care conference will occur within two weeks of discharge     Equipment Recommendations:  RW, TSF, shower chair + arms    Home Exercise Program  Provided Pt with shon    Electronically signed by OSBALDO Mg/L #[id number] on 9/18/2019 at 4:34 PM

## 2019-09-16 NOTE — PROGRESS NOTES
tablet  Commonly known as:  PRINIVIL;ZESTRIL  Take 1 tablet by mouth daily  Start taking on:  9/17/2019     simvastatin 20 MG tablet  Commonly known as:  ZOCOR  Take 1 tablet by mouth nightly     thiamine 100 MG tablet  Take 1 tablet by mouth daily            STOP taking these medications      predniSONE 20 MG tablet  Commonly known as:  Esparza Aver               Where to Get Your Medications        You can get these medications from any pharmacy    Bring a paper prescription for each of these medications  carvedilol 12.5 MG tablet  cloNIDine 0.2 MG tablet  guaiFENesin-dextromethorphan 100-10 MG/5ML syrup  lisinopril 20 MG tablet  menthol-zinc oxide 0.44-20.6 % Oint ointment  metFORMIN 1000 MG tablet         [facility]  Heart Failure Discharge Medication Reconciliation Program  [phone]

## 2019-09-16 NOTE — PROGRESS NOTES
Patient was admitted on 9/5 for sepsis with acute respiratory failure with hypoxia. Patient alert and oriented x 4 . VSS. Patient's last bowel movement noted on September , 16 , 2019. Patient transfers x 1 with walker. Patient aware of room and surroundings. Morning assessment complete. Patient educated on use of call light. Medication administered this morning with no complications. Patient current pain level status: 3, Interventions made as necessary: tylenol requested and RN administered, see mar. The use of rest and other techniques specific to this patient are used as a means of non-pharmacological interventions for pain. Continuing education specific to the this patient is continue throughout stay. Bed/Chair alarm on  and call light within reach. Input and output being monitored along with daily weights. Patient has no further needs at this time. RN will continue to monitor.    RN re-weighed patient per order from MD.

## 2019-09-16 NOTE — PLAN OF CARE
Problem: Falls - Risk of:  Goal: Will remain free from falls  Description  Will remain free from falls  Outcome: Ongoing  Goal: Absence of physical injury  Description  Absence of physical injury  Outcome: Ongoing   Fall risk assessment completed. Fall precautions in place. Call light within reach. Pt educated on calling for assistance before getting up. Walkway free of clutter. Will continue to monitor. Problem: Risk for Impaired Skin Integrity  Goal: Tissue integrity - skin and mucous membranes  Description  Structural intactness and normal physiological function of skin and  mucous membranes. Outcome: Ongoing   Skin assessment performed each shift per protocol. Skin care protocol in place. Checked skin overlying bony prominences. Checked skin folds, between fingers and toes, and under and around medical devices for skin integrity. Will continue to monitor. Problem: OXYGENATION/RESPIRATORY FUNCTION  Goal: Patient will achieve/maintain normal respiratory rate/effort  Description  Respiratory rate and effort will be within normal limits for the patient  9/16/2019 1055 by Shayla Anguiano RN  Outcome: Ongoing  9/16/2019 0538 by Francisco Machado RN  Outcome: Ongoing  Note:   Pt with h/o CHF. Assess lungs every shift. Encourage IS and CADB. Monitor I and O. Monitor Low Na diet. Maintain 2L fluid restriction per orders. Monitor any cough and sputum. Daily weights. Problem: HEMODYNAMIC STATUS  Goal: Patient has stable vital signs and fluid balance  Outcome: Ongoing     Problem: ACTIVITY INTOLERANCE/IMPAIRED MOBILITY  Goal: Mobility/activity is maintained at optimum level for patient  Outcome: Ongoing     Problem: Acute Pain  Goal: Control of acute pain  Description  Control of acute pain     Outcome: Ongoing   Pt encouraged to increase ambulation and mobility. Pt working with PT and OT and performing strengthening excercises.  Pt is encouraged to take frequent rest periods to prevent overexertion and

## 2019-09-17 NOTE — PLAN OF CARE
Problem: Falls - Risk of:  Goal: Will remain free from falls  Description  Will remain free from falls  9/16/2019 2355 by Marjan Bradley RN  Outcome: Ongoing  Note:   Fall risk band on patient. Orange light on outside of room. Non skid footwear in place. Alarms used appropriately. Patient instructed to call and wait for staff before getting up. Rounding done to anticipate needs. Appropriate safety devices used for transfers. Problem: Acute Pain  Goal: Control of acute pain  Description  Control of acute pain     Control of acute pain  9/16/2019 2355 by Marjan Bradley RN  Outcome: Ongoing  Note:   Patient able to express pain and rate pain using pain scale. Medicate as needed per orders. Reassess patient pain level within one hour after oral pain medication/intervention to assure patient has reduced pain sensation and document outcome. Non pharmaceutical interventions as appropriate.

## 2019-09-17 NOTE — PROGRESS NOTES
CLINICAL PHARMACY NOTE: MEDS TO [address] Patient?: No  Total # of Prescriptions Filled: 6   The following medications were delivered to the patient:     Medication List      START taking these medications    menthol-zinc oxide 0.44-20.6 % Oint ointment  Commonly known as:  CALMOSEPTINE  Apply topically 4 times daily as needed (apply to skin sore in tailbone area BID) Max 30 ml per day. CHANGE how you take these medications    carvedilol 12.5 MG tablet  Commonly known as:  COREG  Take 3 tablets by mouth 2 times daily  What changed:    · medication strength  · how much to take  Notes to patient:  Heart pill     furosemide 20 MG tablet  Commonly known as:  LASIX  2 tabs in the morning and 1 tab in the late afternoon  What changed:  additional instructions  Notes to patient:  Water pill     metFORMIN 1000 MG tablet  Commonly known as:  GLUCOPHAGE  Take 1 tablet by mouth 2 times daily (with meals)  What changed:    · medication strength  · See the new instructions. Notes to patient:  For diabetes        CONTINUE taking these medications    amLODIPine 5 MG tablet  Commonly known as:  NORVASC  Take 1 tablet by mouth daily  Notes to patient: For blood pressure     cloNIDine 0.2 MG tablet  Commonly known as:  CATAPRES  Take 1 tablet by mouth 3 times daily  Notes to patient:   For blood pressure     dabigatran 150 MG capsule  Commonly known as:  PRADAXA  Take 1 capsule by mouth 2 times daily  Notes to patient:  Blood thinner     folic acid 1 MG tablet  Commonly known as:  FOLVITE  Take 1 tablet by mouth daily  Notes to patient:  Vitamin supplement     guaiFENesin-dextromethorphan 100-10 MG/5ML syrup  Commonly known as:  ROBITUSSIN DM  Take 5 mLs by mouth every 4 hours as needed for Cough  Notes to patient:  Cough syrup     lisinopril 20 MG tablet  Commonly known as:  PRINIVIL;ZESTRIL  Take 1 tablet by mouth daily  Notes to patient:  Blood pressure     simvastatin 20 MG tablet  Commonly known as:

## 2019-09-17 NOTE — PROGRESS NOTES
DC order noted. Pt has received HF education on both the inpt side and during ARU stay. HF dc instructions placed on AVS and ALDO as pt going home with [facility]. Follow up appt scheduled with cardiology for Monday 9/23 at 8:30 with Allyson Sharp HF NP (I informed bedside nurse of this appt yesterday and she informed pt).

## 2019-09-17 NOTE — DISCHARGE SUMMARY
[facility] & transported to [facility]. He declined a shower today & plans to have his [place] cut\" tomorrow once he returns home. His wife Kelley Mendoza was present during several sessions for training & acquistion of DME & A/E. He had GB, higher toilet + TSF installed, getting a shower chair + arms, RW from [facility]. Pt is returning home on Tues w/ [facility] services, has supportive wife who provides IADLs    Speech therapy:  Comprehension: 6 - Complex ideas 90% or device (hearing aid or glasses- if patient is primarily a visual learner)  Expression: 6 - Device used to express complex ideas/needs  Social Interaction: 7 - Patient has appropriate behavior/relations 100% of the time  Problem Solvin - Independent with device (e.g. notes, schedules)  Memory: 6 - Patient requires device to recall (e.g. memory book)      Inpatient Rehabilitation Course: Ronn Teran is a 75 y.o. male admitted to inpatient rehabilitation on 2019 for s/p Debility. The patient participated in an aggressive multidisciplinary inpatient rehabilitation program involving 3 hours per day, 5 days per week of rehabilitation. Patient is a 71-year-old male with a past medical history of A. fib, CHF, hypertension, hyperlipidemia who presented to [facility] with shortness of breath, cough and was found to have an oxygen saturation of 86%.  He was admitted with possible sepsis and possible pneumonia.  Patient was also found to have uncontrolled hypertension and was started on a Cardene drip on admission.  That has been switched to oral blood pressure medications with good blood pressure control.  He had both knees injected with cortisone with improvement.  He was started therapies, but needs more therapy before discharge to home safely. [facility] does live at home with his wife in a condo.  He is agreeable to rehab unit treatment. After admission to the Rehab Unit, he made steady progress in his therapies as listed above under each therapy section.  His upper and right pleural effusion, similar to previous exam.  Slight worsening of interstitial prominence and hazy opacity throughout the lungs. Findings as above likely related to worsening edema. Xr Chest Portable    Result Date: 8/28/2019  EXAMINATION: ONE XRAY VIEW OF THE CHEST 8/28/2019 4:39 pm COMPARISON: 08/27/2019 HISTORY: ORDERING SYSTEM PROVIDED HISTORY: SOB TECHNOLOGIST PROVIDED HISTORY: Reason for exam:->SOB Reason for Exam: sob Acuity: Acute Type of Exam: Initial FINDINGS: Stable cardiomegaly. Opacification at the right lung base is unchanged. No new airspace disease. Pleural calcifications again noted. Persistent opacification right lung base shown on recent CT to represent rounded atelectasis and right pleural fluid. Stable appearing chest     Xr Chest Portable    Result Date: 8/27/2019  EXAMINATION: ONE XRAY VIEW OF THE CHEST 8/27/2019 5:38 am COMPARISON: Multiple prior exams most recently 12/10/2018 HISTORY: Reason for Exam: weakness Acuity: Acute Type of Exam: Initial Relevant Medical/Surgical History: chf, diabetes FINDINGS: Stable right lower lung field opacification and blunting of costophrenic angle secondary to known chronic loculated effusion and associated atelectasis. Left lung is clear. No pneumothorax. The cardiomediastinal silhouette is stable. The osseous structures are grossly intact without acute process. Stable appearing chest without evidence of acute process and chronic changes about the right lower lung field/costophrenic angle as described. Ct Chest Pulmonary Embolism W Contrast    Result Date: 8/27/2019  EXAMINATION: CTA OF THE CHEST 8/27/2019 6:45 am TECHNIQUE: CTA of the chest was performed after the administration of intravenous contrast.  Multiplanar reformatted images are provided for review. MIP images are provided for review.  Dose modulation, iterative reconstruction, and/or weight based adjustment of the mA/kV was utilized to reduce the radiation dose

## 2019-09-17 NOTE — PROGRESS NOTES
Data- discharge order received, pt verbalized agreement to discharge, disposition to previous residence,  Patient has HHC/DME. Action- discharge instructions prepared and given to patient and his wife, pt verbalized understanding. Medication information packet given r/t NEW and/or CHANGED prescriptions emphasizing name/purpose/side effects, pt verbalized understanding. Discharge instruction summary: Diet- Carb control, Activity- HHC, Primary Care Physician as followsArthur Nieto -[phone] f/u appointment Sep 23rd, immunizations reviewed and completed, prescription medications filled and wife picked up. CHF Education reviewed. Pt/ Family has had a total of 60 minutes CHF education this admission encounter. Response- Pt belongings gathered, . Disposition is home, taken to lobby via w/c w/ belonging, no complications.

## 2019-11-07 PROBLEM — I95.9 HYPOTENSION: Status: ACTIVE | Noted: 2019-01-01

## 2019-11-10 VITALS
HEIGHT: 70 IN | TEMPERATURE: 97 F | BODY MASS INDEX: 25.85 KG/M2 | DIASTOLIC BLOOD PRESSURE: 23 MMHG | HEART RATE: 31 BPM | WEIGHT: 180.56 LBS | RESPIRATION RATE: 11 BRPM | SYSTOLIC BLOOD PRESSURE: 41 MMHG | OXYGEN SATURATION: 74 %

## 2019-11-10 PROCEDURE — 2500000003 HC RX 250 WO HCPCS

## 2019-11-11 ENCOUNTER — TELEPHONE (OUTPATIENT)
Dept: FAMILY MEDICINE CLINIC | Age: 74
End: 2019-11-11

## 2019-11-12 LAB
BLOOD CULTURE, ROUTINE: NORMAL
CULTURE, BLOOD 2: NORMAL

## 2021-01-20 NOTE — PROGRESS NOTES
Advised by Danyelle breen that the procedure is 1/26 and this was confirmed by patient.  Patient's pre-op is 1/22.  Please add pre-op tests/labs and route back to PSRs for scheduling. Thank you.   Hospitalist Progress Note      PCP: Malcom Kelsey DO    Date of Admission: 8/27/2019        Subjective: feels ok, had some confusion last night, denies chest pain, SOB, nausea, vomiting or diarrhea. Medications:  Reviewed    Infusion Medications    niCARdipine 2.5 mg/hr (08/28/19 8053)    dextrose       Scheduled Medications    furosemide  20 mg Oral Daily    cefTRIAXone (ROCEPHIN) IV  2 g Intravenous Q24H    sodium chloride flush  10 mL Intravenous 2 times per day    sodium chloride  1,000 mL Intravenous Once    doxycycline hyclate  100 mg Oral 2 times per day    carvedilol  25 mg Oral BID    cloNIDine  0.1 mg Oral BID    dabigatran  150 mg Oral BID    lisinopril  5 mg Oral Daily    simvastatin  20 mg Oral Nightly    insulin lispro  0-6 Units Subcutaneous TID WC    insulin lispro  0-3 Units Subcutaneous Nightly    ipratropium-albuterol  1 ampule Inhalation N9B WA    folic acid  1 mg Oral Daily    thiamine (VITAMIN B1) IVPB  100 mg Intravenous Daily     PRN Meds: labetalol, hydrALAZINE, sodium chloride flush, LORazepam **OR** LORazepam **OR** LORazepam **OR** LORazepam **OR** LORazepam **OR** LORazepam **OR** LORazepam **OR** LORazepam, magnesium hydroxide, ondansetron, glucose, dextrose, glucagon (rDNA), dextrose      Intake/Output Summary (Last 24 hours) at 8/29/2019 0657  Last data filed at 8/29/2019 0330  Gross per 24 hour   Intake 360 ml   Output 1150 ml   Net -790 ml       Physical Exam Performed:    BP (!) 173/74   Pulse 85   Temp 97.4 °F (36.3 °C) (Oral)   Resp 30   Ht 5' 9\" (1.753 m)   Wt 194 lb 10.7 oz (88.3 kg)   SpO2 98%   BMI 28.75 kg/m²     General appearance: No apparent distress  Neck: Supple  Respiratory:  Normal respiratory effort. Clear to auscultation, bilaterally without Rales/Wheezes/Rhonchi. Cardiovascular: Regular rate and rhythm with normal S1/S2 without murmurs, rubs or gallops.   Abdomen: Soft, non-tender  Musculoskeletal: No clubbing  Skin: Skin

## 2021-12-28 NOTE — PATIENT INSTRUCTIONS
-controlled with KRT  -now with resp alkalosis   a pneumococcal vaccine shot. If you have had one before, ask your doctor whether you need another dose. Get a flu shot every year. If you must be around people with colds or flu, wash your hands often. Activity  ? · If your doctor recommends it, get more exercise. Walking is a good choice. Bit by bit, increase the amount you walk every day. Try for at least 30 minutes on most days of the week. You also may want to swim, bike, or do other activities. Your doctor may suggest that you join a cardiac rehabilitation program so that you can have help increasing your physical activity safely. ? · Start light exercise if your doctor says it is okay. Even a small amount will help you get stronger, have more energy, and manage stress. Walking is an easy way to get exercise. Start out by walking a little more than you did in the hospital. Gradually increase the amount you walk. ? · When you exercise, watch for signs that your heart is working too hard. You are pushing too hard if you cannot talk while you are exercising. If you become short of breath or dizzy or have chest pain, sit down and rest immediately. ? · Check your pulse regularly. Place two fingers on the artery at the palm side of your wrist, in line with your thumb. If your heartbeat seems uneven or fast, talk to your doctor. When should you call for help? Call 911 anytime you think you may need emergency care. For example, call if:  ? · You have symptoms of a heart attack. These may include:  ¨ Chest pain or pressure, or a strange feeling in the chest.  ¨ Sweating. ¨ Shortness of breath. ¨ Nausea or vomiting. ¨ Pain, pressure, or a strange feeling in the back, neck, jaw, or upper belly or in one or both shoulders or arms. ¨ Lightheadedness or sudden weakness. ¨ A fast or irregular heartbeat. After you call 911, the  may tell you to chew 1 adult-strength or 2 to 4 low-dose aspirin. Wait for an ambulance. Do not try to drive yourself.    ?